# Patient Record
Sex: MALE | Race: WHITE | NOT HISPANIC OR LATINO | Employment: OTHER | URBAN - METROPOLITAN AREA
[De-identification: names, ages, dates, MRNs, and addresses within clinical notes are randomized per-mention and may not be internally consistent; named-entity substitution may affect disease eponyms.]

---

## 2017-02-08 ENCOUNTER — TELEPHONE (OUTPATIENT)
Dept: SURGERY | Facility: CLINIC | Age: 67
End: 2017-02-08

## 2017-02-08 NOTE — TELEPHONE ENCOUNTER
Called patient to check in and see how he is doing, and to also reschedule his OR procedure that was cancelled in December (please see previous telephone encounter).  Patient states he is doing better, and is planning to follow up with his cardiologist in March for an irregular heart beat.  Patient and I both agreed his procedure should be rescheduled sometime after he meets with his cardiologist again , for clearance purposes.  Patient is currently held for surgery 4/26/17.  I told patient I would follow up with him towards the end of March to see how things are going, and to finalize surgery.  Patient is in agreement with this plan, and has my direct number for questions or concerns.

## 2017-03-10 ENCOUNTER — TRANSFERRED RECORDS (OUTPATIENT)
Dept: HEALTH INFORMATION MANAGEMENT | Facility: CLINIC | Age: 67
End: 2017-03-10

## 2017-03-23 ENCOUNTER — TELEPHONE (OUTPATIENT)
Dept: SURGERY | Facility: CLINIC | Age: 67
End: 2017-03-23

## 2017-03-23 NOTE — TELEPHONE ENCOUNTER
Spoke with patient today to check in regarding upcoming surgery, and to see if his cardiologist has cleared him for his procedure.  Patient states he saw his cardiologist last week, who has cleared him to have his procedure.  Informed patient I will contact his cardiology clinic to retrieve the clinic note.  Patient states [facility], and wonders if that will be valid for his pre-op physical.  Informed patient that his pre-op physical needs to be within 30 days of surgery.  Informed patient that he can check with [facility] to see if he is willing to update it (any time after 3/26/17), otherwise he will need an additional appointment.  Patient verbalized understanding.  I told patient I would confirm once we receive his cardiology note, and will mail a surgery packet with all information.  Patient verbalized understanding and has my direct number for questions or concerns.

## 2017-03-24 ENCOUNTER — TRANSFERRED RECORDS (OUTPATIENT)
Dept: HEALTH INFORMATION MANAGEMENT | Facility: CLINIC | Age: 67
End: 2017-03-24

## 2017-03-24 NOTE — TELEPHONE ENCOUNTER
Spoke with patient today, and confirmed I received his most recent cardiology note from [facility].  Confirmed with patient that he will still need to do 1 fleet enema (colostomy established) and that I will have instructions in his prep packet.  Patient confirms he will check with his PCP about updating his most recent H&P, and understands he will need a new H&P if PCP is not agreeable to updating.  Informed patient H&P form will be included in surgery packet.  Patient has my direct number for questions or concerns.

## 2017-04-26 ENCOUNTER — HOSPITAL ENCOUNTER (OUTPATIENT)
Facility: CLINIC | Age: 67
Discharge: HOME | End: 2017-04-26
Attending: COLON & RECTAL SURGERY | Admitting: COLON & RECTAL SURGERY
Payer: COMMERCIAL

## 2017-04-26 ENCOUNTER — ANESTHESIA (OUTPATIENT)
Dept: SURGERY | Facility: CLINIC | Age: 67
End: 2017-04-26
Payer: COMMERCIAL

## 2017-04-26 ENCOUNTER — ANESTHESIA EVENT (OUTPATIENT)
Dept: SURGERY | Facility: CLINIC | Age: 67
End: 2017-04-26
Payer: COMMERCIAL

## 2017-04-26 ENCOUNTER — SURGERY (OUTPATIENT)
Age: 67
End: 2017-04-26

## 2017-04-26 VITALS
RESPIRATION RATE: 16 BRPM | DIASTOLIC BLOOD PRESSURE: 69 MMHG | TEMPERATURE: 98.1 F | OXYGEN SATURATION: 99 % | BODY MASS INDEX: 45.78 KG/M2 | SYSTOLIC BLOOD PRESSURE: 144 MMHG | HEIGHT: 67 IN | WEIGHT: 291.67 LBS

## 2017-04-26 DIAGNOSIS — Z87.19 HISTORY OF ANAL LESION: Primary | ICD-10-CM

## 2017-04-26 LAB
GLUCOSE BLDC GLUCOMTR-MCNC: 176 MG/DL (ref 70–99)
GLUCOSE BLDC GLUCOMTR-MCNC: 236 MG/DL (ref 70–99)

## 2017-04-26 PROCEDURE — 25000128 H RX IP 250 OP 636: Performed by: NURSE ANESTHETIST, CERTIFIED REGISTERED

## 2017-04-26 PROCEDURE — 88305 TISSUE EXAM BY PATHOLOGIST: CPT | Performed by: COLON & RECTAL SURGERY

## 2017-04-26 PROCEDURE — 25000132 ZZH RX MED GY IP 250 OP 250 PS 637: Performed by: COLON & RECTAL SURGERY

## 2017-04-26 PROCEDURE — 36000053 ZZH SURGERY LEVEL 2 EA 15 ADDTL MIN - UMMC: Performed by: COLON & RECTAL SURGERY

## 2017-04-26 PROCEDURE — 25000125 ZZHC RX 250: Performed by: ANESTHESIOLOGY

## 2017-04-26 PROCEDURE — 25800025 ZZH RX 258: Performed by: ANESTHESIOLOGY

## 2017-04-26 PROCEDURE — 25000125 ZZHC RX 250: Performed by: COLON & RECTAL SURGERY

## 2017-04-26 PROCEDURE — 37000008 ZZH ANESTHESIA TECHNICAL FEE, 1ST 30 MIN: Performed by: COLON & RECTAL SURGERY

## 2017-04-26 PROCEDURE — 82962 GLUCOSE BLOOD TEST: CPT

## 2017-04-26 PROCEDURE — 37000009 ZZH ANESTHESIA TECHNICAL FEE, EACH ADDTL 15 MIN: Performed by: COLON & RECTAL SURGERY

## 2017-04-26 PROCEDURE — 36000051 ZZH SURGERY LEVEL 2 1ST 30 MIN - UMMC: Performed by: COLON & RECTAL SURGERY

## 2017-04-26 PROCEDURE — 40000170 ZZH STATISTIC PRE-PROCEDURE ASSESSMENT II: Performed by: COLON & RECTAL SURGERY

## 2017-04-26 PROCEDURE — 27210794 ZZH OR GENERAL SUPPLY STERILE: Performed by: COLON & RECTAL SURGERY

## 2017-04-26 PROCEDURE — 71000027 ZZH RECOVERY PHASE 2 EACH 15 MINS: Performed by: COLON & RECTAL SURGERY

## 2017-04-26 PROCEDURE — 25000125 ZZHC RX 250: Performed by: NURSE ANESTHETIST, CERTIFIED REGISTERED

## 2017-04-26 RX ORDER — SODIUM CHLORIDE, SODIUM LACTATE, POTASSIUM CHLORIDE, CALCIUM CHLORIDE 600; 310; 30; 20 MG/100ML; MG/100ML; MG/100ML; MG/100ML
INJECTION, SOLUTION INTRAVENOUS CONTINUOUS
Status: DISCONTINUED | OUTPATIENT
Start: 2017-04-26 | End: 2017-04-26 | Stop reason: HOSPADM

## 2017-04-26 RX ORDER — FENTANYL CITRATE 50 UG/ML
25-50 INJECTION, SOLUTION INTRAMUSCULAR; INTRAVENOUS EVERY 5 MIN PRN
Status: DISCONTINUED | OUTPATIENT
Start: 2017-04-26 | End: 2017-04-26 | Stop reason: HOSPADM

## 2017-04-26 RX ORDER — PROPOFOL 10 MG/ML
INJECTION, EMULSION INTRAVENOUS PRN
Status: DISCONTINUED | OUTPATIENT
Start: 2017-04-26 | End: 2017-04-26

## 2017-04-26 RX ORDER — ONDANSETRON 2 MG/ML
4 INJECTION INTRAMUSCULAR; INTRAVENOUS EVERY 30 MIN PRN
Status: DISCONTINUED | OUTPATIENT
Start: 2017-04-26 | End: 2017-04-26 | Stop reason: HOSPADM

## 2017-04-26 RX ORDER — ONDANSETRON 4 MG/1
4 TABLET, ORALLY DISINTEGRATING ORAL EVERY 30 MIN PRN
Status: DISCONTINUED | OUTPATIENT
Start: 2017-04-26 | End: 2017-04-26 | Stop reason: HOSPADM

## 2017-04-26 RX ORDER — NALOXONE HYDROCHLORIDE 0.4 MG/ML
.1-.4 INJECTION, SOLUTION INTRAMUSCULAR; INTRAVENOUS; SUBCUTANEOUS
Status: DISCONTINUED | OUTPATIENT
Start: 2017-04-26 | End: 2017-04-26 | Stop reason: HOSPADM

## 2017-04-26 RX ORDER — LABETALOL HYDROCHLORIDE 5 MG/ML
10 INJECTION, SOLUTION INTRAVENOUS
Status: DISCONTINUED | OUTPATIENT
Start: 2017-04-26 | End: 2017-04-26 | Stop reason: HOSPADM

## 2017-04-26 RX ORDER — NAPROXEN 500 MG/1
500 TABLET ORAL
Status: DISCONTINUED | OUTPATIENT
Start: 2017-04-26 | End: 2017-04-26 | Stop reason: HOSPADM

## 2017-04-26 RX ORDER — GINSENG 100 MG
CAPSULE ORAL PRN
Status: DISCONTINUED | OUTPATIENT
Start: 2017-04-26 | End: 2017-04-26 | Stop reason: HOSPADM

## 2017-04-26 RX ORDER — ONDANSETRON 4 MG/1
4 TABLET, ORALLY DISINTEGRATING ORAL
Status: DISCONTINUED | OUTPATIENT
Start: 2017-04-26 | End: 2017-04-26 | Stop reason: HOSPADM

## 2017-04-26 RX ORDER — LIDOCAINE 40 MG/G
CREAM TOPICAL
Status: DISCONTINUED | OUTPATIENT
Start: 2017-04-26 | End: 2017-04-26 | Stop reason: HOSPADM

## 2017-04-26 RX ORDER — FENTANYL CITRATE 50 UG/ML
25-50 INJECTION, SOLUTION INTRAMUSCULAR; INTRAVENOUS
Status: DISCONTINUED | OUTPATIENT
Start: 2017-04-26 | End: 2017-04-26 | Stop reason: HOSPADM

## 2017-04-26 RX ORDER — MEPERIDINE HYDROCHLORIDE 25 MG/ML
12.5 INJECTION INTRAMUSCULAR; INTRAVENOUS; SUBCUTANEOUS
Status: DISCONTINUED | OUTPATIENT
Start: 2017-04-26 | End: 2017-04-26 | Stop reason: HOSPADM

## 2017-04-26 RX ORDER — PROPOFOL 10 MG/ML
INJECTION, EMULSION INTRAVENOUS CONTINUOUS PRN
Status: DISCONTINUED | OUTPATIENT
Start: 2017-04-26 | End: 2017-04-26

## 2017-04-26 RX ORDER — AMOXICILLIN 250 MG
1-2 CAPSULE ORAL 2 TIMES DAILY
Qty: 30 TABLET | Refills: 0 | Status: ON HOLD | OUTPATIENT
Start: 2017-04-26 | End: 2017-08-30

## 2017-04-26 RX ORDER — OXYCODONE HYDROCHLORIDE 5 MG/1
5 TABLET ORAL EVERY 4 HOURS PRN
Status: DISCONTINUED | OUTPATIENT
Start: 2017-04-26 | End: 2017-04-26 | Stop reason: HOSPADM

## 2017-04-26 RX ORDER — OXYCODONE HYDROCHLORIDE 5 MG/1
5 TABLET ORAL EVERY 4 HOURS PRN
Qty: 18 TABLET | Refills: 0 | Status: SHIPPED | OUTPATIENT
Start: 2017-04-26 | End: 2017-05-16

## 2017-04-26 RX ORDER — FENTANYL CITRATE 50 UG/ML
INJECTION, SOLUTION INTRAMUSCULAR; INTRAVENOUS PRN
Status: DISCONTINUED | OUTPATIENT
Start: 2017-04-26 | End: 2017-04-26

## 2017-04-26 RX ORDER — BUPIVACAINE HYDROCHLORIDE AND EPINEPHRINE 2.5; 5 MG/ML; UG/ML
INJECTION, SOLUTION EPIDURAL; INFILTRATION; INTRACAUDAL; PERINEURAL PRN
Status: DISCONTINUED | OUTPATIENT
Start: 2017-04-26 | End: 2017-04-26 | Stop reason: HOSPADM

## 2017-04-26 RX ORDER — ACETAMINOPHEN 325 MG/1
650 TABLET ORAL 4 TIMES DAILY
Qty: 56 TABLET | Refills: 0 | Status: SHIPPED | OUTPATIENT
Start: 2017-04-26 | End: 2017-05-03

## 2017-04-26 RX ORDER — ACETAMINOPHEN 325 MG/1
975 TABLET ORAL ONCE
Status: COMPLETED | OUTPATIENT
Start: 2017-04-26 | End: 2017-04-26

## 2017-04-26 RX ORDER — ACETIC ACID 5 %
LIQUID (ML) MISCELLANEOUS PRN
Status: DISCONTINUED | OUTPATIENT
Start: 2017-04-26 | End: 2017-04-26 | Stop reason: HOSPADM

## 2017-04-26 RX ADMIN — ACETAMINOPHEN 975 MG: 325 TABLET, FILM COATED ORAL at 08:22

## 2017-04-26 RX ADMIN — FENTANYL CITRATE 50 MCG: 50 INJECTION INTRAMUSCULAR; INTRAVENOUS at 11:36

## 2017-04-26 RX ADMIN — BACITRACIN 1 TUBE: 500 OINTMENT TOPICAL at 10:23

## 2017-04-26 RX ADMIN — SODIUM CHLORIDE, POTASSIUM CHLORIDE, SODIUM LACTATE AND CALCIUM CHLORIDE: 600; 310; 30; 20 INJECTION, SOLUTION INTRAVENOUS at 09:40

## 2017-04-26 RX ADMIN — PROPOFOL 20 MG: 10 INJECTION, EMULSION INTRAVENOUS at 10:12

## 2017-04-26 RX ADMIN — FENTANYL CITRATE 50 MCG: 50 INJECTION, SOLUTION INTRAMUSCULAR; INTRAVENOUS at 09:56

## 2017-04-26 RX ADMIN — Medication 60 ML: at 10:22

## 2017-04-26 RX ADMIN — PROPOFOL 10 MG: 10 INJECTION, EMULSION INTRAVENOUS at 10:13

## 2017-04-26 RX ADMIN — PROPOFOL 10 MG: 10 INJECTION, EMULSION INTRAVENOUS at 10:40

## 2017-04-26 RX ADMIN — FENTANYL CITRATE 50 MCG: 50 INJECTION INTRAMUSCULAR; INTRAVENOUS at 11:27

## 2017-04-26 RX ADMIN — FENTANYL CITRATE 50 MCG: 50 INJECTION, SOLUTION INTRAMUSCULAR; INTRAVENOUS at 09:45

## 2017-04-26 RX ADMIN — MIDAZOLAM HYDROCHLORIDE 2 MG: 1 INJECTION, SOLUTION INTRAMUSCULAR; INTRAVENOUS at 09:40

## 2017-04-26 RX ADMIN — BUPIVACAINE HYDROCHLORIDE AND EPINEPHRINE BITARTRATE 30 ML: 2.5; .0091 INJECTION, SOLUTION EPIDURAL; INFILTRATION; INTRACAUDAL; PERINEURAL at 10:22

## 2017-04-26 RX ADMIN — PROPOFOL 40 MCG/KG/MIN: 10 INJECTION, EMULSION INTRAVENOUS at 09:46

## 2017-04-26 NOTE — ANESTHESIA POSTPROCEDURE EVALUATION
Patient: Anselmo Saenzleclaura    Procedure(s):  Anal Exam with Anal Microscopy and Biopsies    - Wound Class: II-Clean Contaminated    Diagnosis:Anal Dysplasia   Diagnosis Additional Information: No value filed.    Anesthesia Type:  MAC    Note:  Anesthesia Post Evaluation    Patient location during evaluation: Phase 2  Patient participation: Able to fully participate in evaluation  Level of consciousness: awake  Pain management: adequate  Airway patency: patent  Cardiovascular status: acceptable  Respiratory status: acceptable  Hydration status: acceptable  PONV: none     Anesthetic complications: None    Comments: No noted anesthetic complications.  Patient satisfied with anesthetic.          Last vitals:  Vitals:    04/26/17 0749   BP: 123/65   Resp: 16   Temp: 36.6  C (97.8  F)   SpO2: 99%         Electronically Signed By: Fab Portillo MD  April 26, 2017  12:26 PM

## 2017-04-26 NOTE — ANESTHESIA CARE TRANSFER NOTE
Patient: Anselmo Saenzleclaura    Procedure(s):  Anal Exam with Anal Microscopy and Biopsies    - Wound Class: II-Clean Contaminated    Diagnosis: Anal Dysplasia   Diagnosis Additional Information: No value filed.    Anesthesia Type:   MAC     Note:  Airway :Face Mask  Patient transferred to:Phase II  Comments: Pt. Breathing spont, VSS, opens eyes to command.  Report to RN.      Vitals: (Last set prior to Anesthesia Care Transfer)    CRNA VITALS  4/26/2017 1027 - 4/26/2017 1105      4/26/2017             Resp Rate (set): 10                Electronically Signed By: DESTINEY Naidu CRNA  April 26, 2017  11:05 AM

## 2017-04-26 NOTE — LETTER
May 8, 2017    Anselmo Valdez  [address] [facility] [address]    Dear Anselmo,    The results of your biopsies showed AIN lll in one specimen.  Dr. Da Silva recommends that you repeat high resolution microscopy in the clinic in 4 months.  I will ask our  to contact you in July/August to schedule in clinic.    Sincerely,  Margaret Mora, RN, BSN  Colon-Rectal Surgery  Care Coordinator  RN Triage line  [phone], option #3      Resulted Orders   Surgical pathology exam   Result Value Ref Range    Copath Report       Patient Name: ANSELMO VALDEZ  MR#: [id number]  Specimen #: [id number]  Collected: 4/26/2017  Received: 4/26/2017  Reported: 4/27/2017 11:46  Ordering Phy(s): BHANU DA SILVA    For improved result formatting, select 'View Enhanced Report Format'  under Linked Documents section.    SPECIMEN(S):  A: Left posterior anal margin  B: Posterior anal ulcer  C: Anterior margin    FINAL DIAGNOSIS:  A. ANUS, LEFT POSTERIOR MARGIN, BIOPSY:  - High grade squamous intraepithelial lesion (anal intraepithelial  neoplasia 3)  - Background chronic inflammation with ulceration    B. ANUS, POSTERIOR ULCER, BIOPSY:  - Squamous and regenerative colonic mucosa with ulceration and  granulation tissue formation  - Negative for dysplasia/intraepithelial neoplasia    C. ANUS, ANTERIOR MARGIN, BIOPSY:  - Denuded tissue with scant residual squamous epithelium  - Negative for dysplasia/intraepithelial neoplasia in the residual  squamous mucosa    I have personally reviewed all specimens and or slides, including the   listed special stains, and used them with my medical judgement to  determine the final diagnosis.    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 Prior pathology has  demonstrated AIN-3 with a chronic anal ulcer.    Anoscopy findings:  The chronic  "posterior midline anal ulcer was visualized. This appeared  now to have a fibrotic base and relatively shallow granulation tissue on  the surface. The lesion looked stable and did not suggest neoplasm -  curetted. At the left margin of the ulcer there was one 3 mm focal area  of acetowhitening and coarse punctation. This was removed with the  Tischler forceps and the area base biopsied. At the anterior anal margin  the mucosa was smooth and had some friable capillaries on the surface.  These did not have the appearance of punctation or mosaicism - biopsied.    GROSS:  A: The specimen  is received in formalin with proper patient  identification, labeled \"left posterior anal margin\".  The specimen  consists of 2 tan-pink soft tissue fragments that range in size from 4-5  mm greatest dimension.  Entirely submitted in A1.    B: The specimen is received in formalin with proper patient  identification, labeled \"posterior anal ulcer\".  The specimen consists  of approximately 2 dusky pink irregularly shaped soft tissue fragments  that range in size from 3-7 mm greatest dimension.  Entirely submitted  in B1.    C: The specimen is received in formalin with proper patient  identification, labeled \"anterior margin\".  The specimen consists of a  gray-pink soft tissue fragment that measures 2 mm greatest dimension.  Entirely submitted in C1. (Dictated by: Josh Morocho 4/26/2017 01:52  PM)    MICROSCOPIC:  Microscopic examination was performed.    CPT Codes:  A: 77583-UK4  B: 50011-RI7  C: 11383-AG0    TESTING LAB LOCATION:  [address]   [id number] [phone]    COLLECTION SITE:  Client: [facility]  Location: [facility] (B)       "

## 2017-04-26 NOTE — IP AVS SNAPSHOT
Same Day Surgery [address] [address]    Phone:  [phone]                                       After Visit Summary   4/26/2017    Anselmo Rainey    MRN: [id number]           After Visit Summary Signature Page     I have received my discharge instructions, and my questions have been answered. I have discussed any challenges I see with this plan with the nurse or doctor.    ..........................................................................................................................................  Patient/Patient Representative Signature      ..........................................................................................................................................  Patient Representative Print Name and Relationship to Patient    ..................................................               ................................................  Date                                            Time    ..........................................................................................................................................  Reviewed by Signature/Title    ...................................................              ..............................................  Date                                                            Time

## 2017-04-26 NOTE — OR NURSING
Dr. Portillo aware of . No orders to treat at this time. Patient drank sprite this morning on purpose so that his sugar would not drop before he could eat again.

## 2017-04-26 NOTE — BRIEF OP NOTE
Colorectal Surgery Brief Op Note    Patient: Anselmo Rainey  MRN: [id number]  Date of Procedure: 4/26/2017    PREOPERATIVE DIAGNOSES: History of high-grade anal dysplasia    POSTOPERATIVE DIAGNOSES:   Same    PROCEDURES: High-resolution anoscopy, anal biopsies.    SURGEON: Sarbjit Mai MD     ASSISTANTS: Rule Martinez MD, Colorectal fellow; Grace Casas MD, Gyn Onc fellow    ANESTHESIA: MAC with perianal block.    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    SPECIMENS:   1. Left posterior anal margin  2 Posterior anal ulcer  3. Anterior margin    COMPLICATIONS: None.     CONDITION: Stable to PACU.

## 2017-04-26 NOTE — PROGRESS NOTES
SPIRITUAL HEALTH SERVICES  [facility] 3C   PRE-SURGERY VISIT    Had pre-surgery visit with Long. He shared briefly that his spiritual background includes the twelve step program. He is a  who was stationed in [place], where he met his wife THOMAS. She is here in the waiting room along with a friend Pino. Provided prayer and spiritual support.    Fariba Gonzalez  Volunteer   Pager [phone]

## 2017-04-26 NOTE — IP AVS SNAPSHOT
MRN:[id number]                      After Visit Summary   4/26/2017    Anselmo Rainey    MRN: [id number]           Thank you!     Thank you for choosing [facility] for your care. Our goal is always to provide you with excellent care. Hearing back from our patients is one way we can continue to improve our services. Please take a few minutes to complete the written survey that you may receive in the mail after you visit with us. Thank you!        Patient Information     Date Of Birth          1950        About your hospital stay     You were admitted on:  April 26, 2017 You last received care in the:  Same Day Surgery [facility]    You were discharged on:  April 26, 2017       Who to Call     For medical emergencies, please call 911.  For non-urgent questions about your medical care, please call your primary care provider or clinic, None  For questions related to your surgery, please call your surgery clinic        Attending Provider     Provider Sarbjit Vallecillo MD Colon and Rectal Surgery       Primary Care Provider Fax #    [facility] [phone]       [address] [facility] [address]        After Care Instructions     Discharge Instructions       Follow up appointment as instructed by Surgeon and/or RN            Discharge Instructions       Resume pre-procedure diet. Do not drink alcohol, drive, or operate machinery while on narcotic pain medication.            Discharge Instructions       You may notice some brown drainage from your anus that resembles coffee grounds or some light bleeding. This is normal. Call physician if you have heavy bleeding.                  Further instructions from your care team       Anorectal Surgery Instructions    What can I expect after anorectal surgery?  Most anorectal procedures are done as outpatient surgery, and you go home the same day as the procedure. A few surgical procedures will require that you stay in  the hospital for about one to three days. No matter where the procedure is done or how long or short it takes, these recommendations will help you heal and feel more comfortable.    Medicines:  The anal area is very sensitive; you can expect to have some pain for up to 2-4 weeks after the procedure. Your doctor will give you a prescription for one or more pain medications.    Take naprosyn 500 mg twice a day OR ibuprofen 600 mg four times a day     Take this on a regular basis (not as needed) following your surgery.     The drugs are best taken with food.  Do not take if it causes stomach upset or if you have a history of ulcers or gastritis. You can stop the naprosyn (or ibuprofen) or reduce the dose when you are feeling better.    DO NOT use naprosyn, ibuprofen, or other similar agents (eg. Advil or Aleve) if you have inflammatory bowel disease (Ulcerative Colitis or Crohn's disease) or if your doctor as advised you against using these medications    Take acetominaphen (Tylenol) 650-1000 mg four times a day.     Take this on a regular basis (not as needed) following surgery for pain control.     Take the lower dose if you are >65 years old or have liver disease. The maximum dose of acetominaphen is 4000 mg a day. You can stop the acetaminophen or reduce the dose when you are feeling better.    It is important to realize that many narcotic pain relievers (including vicodin, percocet, tylenol #3) also have acetaminophen, and excessive doses of acetaminophen can be dangerous, so do not take these in addition to acetominaphen.  You may take narcotics that don't contain acetominaphen such as oxycodone.      Take oxycodone AS NEEDED in addition to the acetominaphen and naprosyn.      Because narcotics have side effects (including constipation), you should reduce your use of these medications as tolerated as your pain improves.    *In general, the best strategy is to take (if you are able to tolerate it) the tylenol and  naprosen on a regular basis until your pain has largely gone away. You can take the narcotic pain medicine as needed in addition to the tylenol and ibuprofen. As your pain begins to lessen, you should cut back on your narcotic use while continuing to take your regular tylenol and naprosyn doses.      Refilling prescriptions. If you need additional pain medication, please call the triage nurse at [phone] during normal business hours (8 a.m. to 4 p.m., Monday though Friday) or have your pharmacy fax a refill request to [phone]. If you call after hours or on the weekends, the doctor on call may not know you personally and may not renew narcotic pain medication by phone. Call your primary care provider for all other medication refills.    Perineal care:  Tub baths:    If possible, take a tub bath immediately after each bowel movement.     Baths should be take at least 3 times daily for the first week to 10 days following your procedure. You should soak in the tub for 10 to 15 minutes each time with water as warm as you can tolerate.     Even after you go back to work, it is a good idea to sit in the tub in the morning, after returning from work, and again in the evening before bedtime.    Bleeding/Infection:    You can expect to have some bleeding after bowel movements, but it should stop soon after you wipe.     Use a wet cloth or perianal pad (Tucks or Preparation H pads) to gently wipe the area after each bowel movement.    Do not rub the anal area or use a lot of pressure.    Using a spray bottle filled with warm water helps loosen any remaining stool. Blot gently with a soft dry cloth or tissue paper.    Infection around the anal opening is not very common. The anal area has excellent blood supply, which helps the area to heal. Bloody discharge after bowel movements is normal and may last 2 to 4 weeks after your surgery. However, if you bleed between bowel movements and cannot get it to stop, call the  triage nurse immediately [phone].    Bowel function:  Take a fiber supplement such as Metamucil, which is over the counter. It is important to drink six to eight glasses of water or juice everyday when using fiber products.    If you do not have a bowel movement after 1-2 days:    Take Milk of Magnesia-2 tablespoons.       If there are no results, repeat this or add over the counter Miralax.      If you still do not have results, contact the clinic.     If there are no results, repeat this. Stop taking Milk of Magnesia or other laxatives if you begin to have diarrhea.    * Constipation will cause you to strain when you have a bowel movement. The hard stool will be difficult to pass, will increase pain and bleeding, and will slow down healing.  Try to avoid constipation and/or diarrhea as this can make the pain and bleeding worse.    * It is important to have regular bowel movements at least every other day and to keep your stool soft.  A high fiber diet, including at least four servings of fruits or vegetables daily, will help to keep your bowel movements regular and soft.    Activity:  After your procedure, there are no restrictions on your activity     except restrictions surrounding being on narcotics and in pain, such as no heavy machine operating or driving.     You may walk, climb stairs, ride in a car, and sit as tolerated.     It is helpful to avoid sitting in one position for long periods (2 or more hours).    After some surgeries, you may be told not to perform any lifting (more than 10 pounds) for several weeks after surgery.    When to call:  When do I need to call the doctor or triage nurse?    If you experience any of the problems listed here, call our triage nurse during business hours ([phone]).     The nurse will help you with your problem or have the doctor call you.     After hours and on weekends, please call the main hospital number ([phone]) and ask for the colon and rectal  surgery person on call.     Some is available to help you 24 hours a day, seven days a week.    Call for:   ? Fever greater than 101 degrees   ? Chills   ? Foul-smelling drainage   ? Nausea and vomiting   ? Diarrhea - greater than 3 water stools in 24 hours   ? Constipation - no bowel movement after 3 days   ? Severe bleeding that does not stop soon after a bowel movement   ? Problems with the incision, including increased pain, swelling, or redness    [facility] [place]  Same-Day Surgery   Adult Discharge Orders & Instructions     For 24 hours after surgery    1. Get plenty of rest.  A responsible adult must stay with you for at least 24 hours after you leave the hospital.   2. Do not drive or use heavy equipment.  If you have weakness or tingling, don't drive or use heavy equipment until this feeling goes away.  3. Do not drink alcohol.  4. Avoid strenuous or risky activities.  Ask for help when climbing stairs.   5. You may feel lightheaded.  IF so, sit for a few minutes before standing.  Have someone help you get up.   6. If you have nausea (feel sick to your stomach): Drink only clear liquids such as apple juice, ginger ale, broth or 7-Up.  Rest may also help.  Be sure to drink enough fluids.  Move to a regular diet as you feel able.  7. You may have a slight fever. Call the doctor if your fever is over 100 F (37.7 C) (taken under the tongue) or lasts longer than 24 hours.  8. You may have a dry mouth, a sore throat, muscle aches or trouble sleeping.  These should go away after 24 hours.  9. Do not make important or legal decisions.   Call your doctor for any of the followin.  Signs of infection (fever, growing tenderness at the surgery site, a large amount of drainage or bleeding, severe pain, foul-smelling drainage, redness, swelling).    2. It has been over 8 to 10 hours since surgery and you are still not able to urinate (pass water).    3.  Headache for over 24  "hours.    To contact a doctor, call Dr Mai's office at [phone]    or:        [phone] and ask for the resident on call for Colorectal surgery (answered 24 hours a day)      Emergency Department:    [facility]: [phone]       ([facility] for hearing impaired: [phone])            Pending Results     Date and Time Order Name Status Description    2017 1037 Surgical pathology exam In process             Admission Information     Date & Time Provider Department Dept. Phone    2017 Sarbjit Mai MD Same Day Surgery [facility] [phone]      Your Vitals Were     Blood Pressure Temperature Respirations Height Weight Pulse Oximetry    123/65 97.8  F (36.6  C) (Oral) 16 1.702 m (5' 7\") 132.3 kg (291 lb 10.7 oz) 99%    BMI (Body Mass Index)                   45.68 kg/m2           MyChart Information     [employer] lets you send messages to your doctor, view your test results, renew your prescriptions, schedule appointments and more. To sign up, go to [email or web address] . Click on \"Log in\" on the left side of the screen, which will take you to the Welcome page. Then click on \"Sign up Now\" on the right side of the page.     You will be asked to enter the access code listed below, as well as some personal information. Please follow the directions to create your username and password.     Your access code is: [email or web address]  Expires: 2017 11:14 AM     Your access code will  in 90 days. If you need help or a new code, please call your [place] clinic or [phone].        Care EveryWhere ID     This is your Care EveryWhere ID. This could be used by other organizations to access your [place] medical records  [id number]           Review of your medicines      START taking        Dose / Directions    acetaminophen 325 MG tablet   Commonly known as:  TYLENOL   Used for:  History of anal lesion        Dose:  650 mg   Take 2 tablets (650 mg) by mouth 4 times daily for 7 days Do not " take Tylenol with codeine or any other tylenol (or acetaminophen) containing products while taking this medication   Quantity:  56 tablet   Refills:  0       oxyCODONE 5 MG IR tablet   Commonly known as:  ROXICODONE   Used for:  History of anal lesion        Dose:  5 mg   Take 1 tablet (5 mg) by mouth every 4 hours as needed for pain (Take for pain not controlled with Tylenol)   Quantity:  18 tablet   Refills:  0       senna-docusate 8.6-50 MG per tablet   Commonly known as:  SENOKOT-S;PERICOLACE   Used for:  History of anal lesion        Dose:  1-2 tablet   Take 1-2 tablets by mouth 2 times daily Use to prevent or treat constipation.   Quantity:  30 tablet   Refills:  0         CONTINUE these medicines which have NOT CHANGED        Dose / Directions    albuterol 108 (90 BASE) MCG/ACT Inhaler   Generic drug:  albuterol        Dose:  2 puff   Inhale 2 puffs into the lungs every 4 hours as needed   Refills:  0       ASPIRIN PO        Dose:  81 mg   Take 81 mg by mouth daily   Refills:  0       buPROPion 75 MG tablet   Commonly known as:  WELLBUTRIN        Dose:  150 mg   Take 150 mg by mouth 3 times daily   Refills:  0       cholecalciferol 1000 UNIT tablet   Commonly known as:  vitamin D        Dose:  2000 Units   Take 2,000 Units by mouth 3 times daily (with meals)   Refills:  0       CLONAZEPAM PO        Dose:  0.5 mg   Take 0.5 mg by mouth as needed for anxiety   Refills:  0       clotrimazole 1 % cream   Commonly known as:  LOTRIMIN        Apply topically 2 times daily   Refills:  0       desonide 0.05 % cream   Commonly known as:  DESOWEN        Apply topically 2 times daily   Refills:  0       ferrous sulfate 325 (65 FE) MG tablet   Commonly known as:  IRON        Take by mouth 3 times daily (with meals)   Refills:  0       fluocinonide 0.05 % cream   Commonly known as:  LIDEX        Apply topically 2 times daily   Refills:  0       FUROSEMIDE PO   Indication:  Edema        Dose:  40 mg   Take 40 mg by mouth  daily   Refills:  0       glipiZIDE 10 MG tablet   Commonly known as:  GLUCOTROL        Dose:  10 mg   Take 10 mg by mouth 2 times daily (before meals) 2 tabs bid   Refills:  0       glucose 4 G Chew chewable tablet        Dose:  3 tablet   Take 3 tablets by mouth daily as needed   Refills:  0       insulin glargine 100 UNIT/ML injection   Commonly known as:  LANTUS        Dose:  46 Units   Inject 46 Units Subcutaneous every morning   Refills:  0       isosorbide mononitrate 30 MG 24 hr tablet   Commonly known as:  IMDUR        Dose:  30 mg   Take 30 mg by mouth daily   Refills:  0       loperamide 2 MG capsule   Commonly known as:  IMODIUM        Dose:  2 mg   Take 2 mg by mouth 4 times daily as needed   Refills:  0       losartan 50 MG tablet   Commonly known as:  COZAAR        Dose:  100 mg   Take 100 mg by mouth daily   Refills:  0       metFORMIN 850 MG tablet   Commonly known as:  GLUCOPHAGE        Dose:  850 mg   Take 850 mg by mouth 2 times daily (with meals)   Refills:  0       miconazole 2 % powder   Commonly known as:  MICATIN; MICRO GUARD        Apply topically as needed   Refills:  0       mometasone 220 MCG/INH Inhaler   Commonly known as:  ASMANEX        Dose:  2 puff   Inhale 2 puffs into the lungs 2 times daily   Refills:  0       nitroglycerin 0.4 MG sublingual tablet   Commonly known as:  NITROSTAT        Place under the tongue every 5 minutes as needed   Refills:  0       OMEPRAZOLE PO        Dose:  20 mg   Take 20 mg by mouth 2 tabs bid   Refills:  0       orlistat 120 MG capsule   Commonly known as:  XENICAL        Dose:  120 mg   Take 120 mg by mouth 3 times daily (with meals)   Refills:  0       simvastatin 40 MG tablet   Commonly known as:  ZOCOR        Dose:  20 mg   Take 20 mg by mouth At Bedtime   Refills:  0       TEGRETOL PO        Dose:  400 mg   Take 400 mg by mouth 4 times daily   Refills:  0       TERAZOSIN HCL PO        Dose:  2 mg   Take 2 mg by mouth daily   Refills:  0        TOPIRAMATE PO        Dose:  50 mg   Take 50 mg by mouth daily   Refills:  0       triamcinolone 0.1 % cream   Commonly known as:  KENALOG        Apply topically 2 times daily   Refills:  0            Where to get your medicines      These medications were sent to [facility] - [place], [place] - [address]  [address] [address]     Phone:  [phone]     senna-docusate 8.6-50 MG per tablet         Some of these will need a paper prescription and others can be bought over the counter. Ask your nurse if you have questions.     Bring a paper prescription for each of these medications     acetaminophen 325 MG tablet    oxyCODONE 5 MG IR tablet                Protect others around you: Learn how to safely use, store and throw away your medicines at www.disposemymeds.org.             Medication List: This is a list of all your medications and when to take them. Check marks below indicate your daily home schedule. Keep this list as a reference.      Medications           Morning Afternoon Evening Bedtime As Needed    acetaminophen 325 MG tablet   Commonly known as:  TYLENOL   Take 2 tablets (650 mg) by mouth 4 times daily for 7 days Do not take Tylenol with codeine or any other tylenol (or acetaminophen) containing products while taking this medication   Last time this was given:  975 mg on 4/26/2017  8:22 AM                                albuterol 108 (90 BASE) MCG/ACT Inhaler   Inhale 2 puffs into the lungs every 4 hours as needed   Generic drug:  albuterol                                ASPIRIN PO   Take 81 mg by mouth daily                                buPROPion 75 MG tablet   Commonly known as:  WELLBUTRIN   Take 150 mg by mouth 3 times daily                                cholecalciferol 1000 UNIT tablet   Commonly known as:  vitamin D   Take 2,000 Units by mouth 3 times daily (with meals)                                CLONAZEPAM PO   Take 0.5 mg by mouth as needed  for anxiety                                clotrimazole 1 % cream   Commonly known as:  LOTRIMIN   Apply topically 2 times daily                                desonide 0.05 % cream   Commonly known as:  DESOWEN   Apply topically 2 times daily                                ferrous sulfate 325 (65 FE) MG tablet   Commonly known as:  IRON   Take by mouth 3 times daily (with meals)                                fluocinonide 0.05 % cream   Commonly known as:  LIDEX   Apply topically 2 times daily                                FUROSEMIDE PO   Take 40 mg by mouth daily                                glipiZIDE 10 MG tablet   Commonly known as:  GLUCOTROL   Take 10 mg by mouth 2 times daily (before meals) 2 tabs bid                                glucose 4 G Chew chewable tablet   Take 3 tablets by mouth daily as needed                                insulin glargine 100 UNIT/ML injection   Commonly known as:  LANTUS   Inject 46 Units Subcutaneous every morning                                isosorbide mononitrate 30 MG 24 hr tablet   Commonly known as:  IMDUR   Take 30 mg by mouth daily                                loperamide 2 MG capsule   Commonly known as:  IMODIUM   Take 2 mg by mouth 4 times daily as needed                                losartan 50 MG tablet   Commonly known as:  COZAAR   Take 100 mg by mouth daily                                metFORMIN 850 MG tablet   Commonly known as:  GLUCOPHAGE   Take 850 mg by mouth 2 times daily (with meals)                                miconazole 2 % powder   Commonly known as:  MICATIN; MICRO GUARD   Apply topically as needed                                mometasone 220 MCG/INH Inhaler   Commonly known as:  ASMANEX   Inhale 2 puffs into the lungs 2 times daily                                nitroglycerin 0.4 MG sublingual tablet   Commonly known as:  NITROSTAT   Place under the tongue every 5 minutes as needed                                OMEPRAZOLE PO   Take 20  mg by mouth 2 tabs bid                                orlistat 120 MG capsule   Commonly known as:  XENICAL   Take 120 mg by mouth 3 times daily (with meals)                                oxyCODONE 5 MG IR tablet   Commonly known as:  ROXICODONE   Take 1 tablet (5 mg) by mouth every 4 hours as needed for pain (Take for pain not controlled with Tylenol)                                senna-docusate 8.6-50 MG per tablet   Commonly known as:  SENOKOT-S;PERICOLACE   Take 1-2 tablets by mouth 2 times daily Use to prevent or treat constipation.                                simvastatin 40 MG tablet   Commonly known as:  ZOCOR   Take 20 mg by mouth At Bedtime                                TEGRETOL PO   Take 400 mg by mouth 4 times daily                                TERAZOSIN HCL PO   Take 2 mg by mouth daily                                TOPIRAMATE PO   Take 50 mg by mouth daily                                triamcinolone 0.1 % cream   Commonly known as:  KENALOG   Apply topically 2 times daily

## 2017-04-26 NOTE — ANESTHESIA PREPROCEDURE EVALUATION
ANESTHESIA PREOP EVALUATION    HPI: Anselmo Rainey is a 66 year old male who presents for Procedure(s):  Anal Exam with Anal Microscopy and Biopsies    - Wound Class: II-Clean Contaminated      No personal or family h/o anesthesia problems.  Has had multiple anesthetics for similar procedure with MAC and deep sedation.      PMHx/PSHx/ROS:  Past Medical History:   Diagnosis Date     Congestive heart failure, unspecified      COPD (chronic obstructive pulmonary disease) (H)      Coronary artery disease      Diabetes (H)      Gastro-oesophageal reflux disease      Heart disease      History of rectal bleeding      Hypertension      Ileostomy in place (H)      Ischemic colitis (H)      PONV (postoperative nausea and vomiting)      Psoriasis      PTSD (post-traumatic stress disorder)      Seizures (H)      Sleep apnea     no cpap     Stented coronary artery 2011    2 STENTS       Past Surgical History:   Procedure Laterality Date     ANOSCOPY  5/28/2014    Procedure: ANOSCOPY;  Surgeon: Sarbjit Mai MD;  [facility]     BIOPSY ANAL N/A 10/22/2014    Procedure: BIOPSY ANAL;  Surgeon: Sarbjit Mai MD;  [facility]     C LAP,SURG,COLECTOMY,TOTAL,W/PROCTECTOMY       COLONOSCOPY       EXAM UNDER ANESTHESIA ANUS  5/28/2014    Procedure: EXAM UNDER ANESTHESIA ANUS;  Surgeon: Sarbjit Mai MD;  [facility]     EXAM UNDER ANESTHESIA ANUS N/A 10/22/2014    Procedure: EXAM UNDER ANESTHESIA ANUS;  Surgeon: Sarbjit Mai MD;  [facility]     EXAM UNDER ANESTHESIA RECTUM N/A 6/8/2015    Procedure: EXAM UNDER ANESTHESIA RECTUM;  Surgeon: Sarbjit Mai MD;  [facility]     HC PERIANAL BIOPSY      with emergency surgery for post excision or post biopsy hemorrhage     ILEOSTOMY       ILEOSTOMY       MICROSCOPY ANAL N/A 10/22/2014    Procedure: MICROSCOPY ANAL;  Surgeon: Sarbjit Mai MD;  [facility]     MICROSCOPY ANAL N/A 6/8/2015    Procedure: MICROSCOPY ANAL;  Surgeon: Sarbjit Mai MD;   [facility]     SIGMOIDOSCOPY FLEXIBLE  5/28/2014    Procedure: SIGMOIDOSCOPY FLEXIBLE;  Surgeon: Sarbjit Mai MD;  [facility]     STENT         Soc Hx:   Social History   Substance Use Topics     Smoking status: Former Smoker     Smokeless tobacco: Not on file      Comment: Quit in 1999     Alcohol use No       Allergies:   Allergies   Allergen Reactions     Cephalexin Difficulty breathing     Gemfibrozil Other (See Comments)     unknown     Levofloxacin Difficulty breathing     Lisinopril Cough       Meds:     No current facility-administered medications on file prior to encounter.   Current Outpatient Prescriptions on File Prior to Encounter:  oxyCODONE-acetaminophen (PERCOCET) 5-325 MG per tablet Take 1-2 tablets by mouth every 6 hours as needed for moderate to severe pain   CarBAMazepine (TEGRETOL PO) Take 400 mg by mouth 4 times daily   OMEPRAZOLE PO Take 20 mg by mouth 2 tabs bid   TERAZOSIN HCL PO Take 2 mg by mouth daily   TOPIRAMATE PO Take 50 mg by mouth daily   CLONAZEPAM PO Take 0.5 mg by mouth as needed for anxiety   oxyCODONE (ROXICODONE) 5 MG immediate release tablet Take 1-2 tablets (5-10 mg) by mouth every 4 hours as needed for moderate to severe pain   FUROSEMIDE PO Take 40 mg by mouth daily   ASPIRIN PO Take 81 mg by mouth daily   albuterol (ALBUTEROL) 108 (90 BASE) MCG/ACT inhaler Inhale 2 puffs into the lungs every 4 hours as needed   diphenoxylate-atropine (LOMOTIL) 2.5-0.025 MG per tablet Take 1 tablet by mouth 4 times daily as needed   buPROPion (WELLBUTRIN) 75 MG tablet Take 150 mg by mouth 2 times daily   cholecalciferol (VITAMIN D3) 1000 UNIT tablet Take 3,000 Units by mouth daily   clotrimazole (LOTRIMIN) 1 % cream Apply topically 2 times daily   desonide (DESOWEN) 0.05 % cream Apply topically 2 times daily   ferrous sulfate 325 (65 FE) MG tablet Take by mouth 3 times daily (with meals)    fluocinonide (LIDEX) 0.05 % cream Apply topically 2 times daily   glipiZIDE (GLUCOTROL)  10 MG tablet Take 10 mg by mouth 2 times daily (before meals) 2 tabs bid   glucose 4 G CHEW Take 3 tablets by mouth daily as needed   hyaluronidase human (HYLENEX) 150 UNIT/ML SOLN Inject 46 Units Subcutaneous every morning 50 - 60 units in am   isosorbide mononitrate (IMDUR) 30 MG 24 hr tablet Take 30 mg by mouth daily   LIDOCAINE EX Externally apply topically 2 times daily as needed   loperamide (IMODIUM) 2 MG capsule Take 2 mg by mouth 4 times daily as needed   losartan (COZAAR) 50 MG tablet Take 150 mg by mouth daily    metFORMIN (GLUCOPHAGE) 850 MG tablet Take 850 mg by mouth 2 times daily (with meals)   miconazole (MICATIN; MICRO GUARD) 2 % powder Apply topically as needed   mometasone (ASMANEX) 220 MCG/INH inhaler Inhale 2 puffs into the lungs 2 times daily   nitroglycerin (NITROSTAT) 0.4 MG SL tablet Place under the tongue every 5 minutes as needed   orlistat (XENICAL) 120 MG capsule Take 120 mg by mouth 3 times daily (with meals)   simvastatin (ZOCOR) 40 MG tablet Take 20 mg by mouth At Bedtime   triamcinolone (KENALOG) 0.1 % cream Apply topically 2 times daily       [facility] Hb 11.4, Cr 1.4, a1c 9.1)  No results found for this or any previous visit.  No results found for this or any previous visit (from the past 8760 hour(s)).    ECG (4/2017): NSR, 1st deg AVB, bifascicular block (not new)         Physical Exam  Normal systems: dental    Airway   Mallampati: III  TM distance: >3 FB  Neck ROM: full    Dental     Cardiovascular   Rhythm and rate: regular and normal      Pulmonary    breath sounds clear to auscultation                    Anesthesia Plan      History & Physical Review  History and physical reviewed and following examination; no interval change.    ASA Status:  3 .    NPO Status:  > 8 hours    Plan for MAC with Intravenous and Propofol induction. Maintenance will be Balanced.  Reason for MAC:  Chronic cardiopulmonary disease (G9) and Deep or markedly invasive  procedure (G8)  PONV prophylaxis:  Ondansetron (or other 5HT-3)  [place] [facility] and reportedly previously difficult airway (with ostomy surgery).  I discussed the risks and benefits of MAC with moderate/deep sedation with the patient.  Questions were sought and answered.      Fab Portillo MD  Attending Anesthesiologist        Postoperative Care  Postoperative pain management:  IV analgesics and Oral pain medications.      Consents  Anesthetic plan, risks, benefits and alternatives discussed with:  Patient..                          .

## 2017-04-26 NOTE — OP NOTE
PREOPERATIVE DIAGNOSIS:  Followup anal dysplasia, history of ulcerative versus ischemic colitis, history of chronic anal ulcer.      POSTOPERATIVE DIAGNOSIS:  Followup anal dysplasia, history of ulcerative versus ischemic colitis, history of chronic anal ulcer.      PROCEDURE:  Examination under anesthesia with high-definition anal microscopy and biopsies.      HISTORY:  This 66-year-old man underwent emergency colectomy for what was felt at the time to be ischemic colitis in 2012.  Final pathology was more consistent with chronic inflammatory bowel disease.  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  I have been to the operating room with Long on several occasions to follow up the ulcer.  This has gradually decreased in size and was stable in size the last time I examined him.  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  His most recent biopsy showed AIN-1 and the bulk of the ulcer appeared to be chronic inflammatory tissue.  The patient has been doing well at home and presents for routine followup examination.  All risks and alternatives were outlined in detail.  I answered all the patient's questions to his stated satisfaction.  He expressed understanding and provided informed consent.      SURGEON:  Sarbjit Mai MD      ASSISTANTS:  Dr. Martinez, Dr. Casas.      DESCRIPTION OF PROCEDURE:  With Anselmo Rainey in the left lateral decubitus position and the buttocks taped apart, under intravenous sedation by Anesthesia, the perianal skin was prepped and draped in sterile fashion.  A timeout was performed and the patient and procedure confirmed.  Local infiltration of 0.25% Marcaine with epinephrine was utilized and a satisfactory perianal block was obtained.  The anal canal and perianal skin were soaked with acetic acid and high definition anal microscopy was performed using the colposcope.  Again noted the chronic posterior midline anal  ulcer.  This appeared now to have a fibrotic base and relatively shallow granulation tissue on the surface.  The lesion looked stable and did not suggest neoplasm.  I curetted the surface of the ulcer and sent the curettage for permanent section.  Hemostasis was secured with electrocautery and Monsel solution.  At the left margin of the ulcer there was one focal area of acetowhitening and coarse punctation.  This measured about 3 mm in diameter only.  It was very well demarcated.  This was removed with the Tischler forceps and the area base biopsied.  The patient had residual diversion or residual proctitis and the field of view was obscured by oozing from the rectum.  I temporarily packed the rectum with a sponge which helped alleviate this.  Careful inspection of the remaining anal canal showed mild acetowhitening but no intense acetowhitening and no other microvascular change.  At the anterior anal margin the mucosa was smooth and had some friable capillaries on the surface.  These did not have the appearance of punctation or mosaicism.  The area did look distinct for remaining anal margin, so I obtained a biopsy for permanent section and again used electrocautery for hemostasis.  The anal canal was reinspected and hemostasis was satisfactory.  We removed the gauze sponge from the rectum.  A Bacitracin/fluff dressing was applied and procedure terminated.      Estimated blood loss was 10 mL.  The patient tolerated the procedure well without evident complications.  Sponge, needle and instrument counts were reported as correct at the conclusion of the case x2.         BHANU DA SILVA MD             D: 2017 11:06   T: 2017 11:26   MT: CG      Name:     DEMIAN VALDEZ   MRN:      [id number]        Account:        [id number]   :      1950           Procedure Date: 2017      Document: [id number]       cc: JESSICA MCALLISTER MD       [facility] [place]  [facility]

## 2017-04-27 ENCOUNTER — TELEPHONE (OUTPATIENT)
Dept: SURGERY | Facility: CLINIC | Age: 67
End: 2017-04-27

## 2017-04-27 LAB — COPATH REPORT: NORMAL

## 2017-04-27 NOTE — TELEPHONE ENCOUNTER
"Called to check on patient after procedure yesterday. He states that he is having \"lots of problems\". He is sore but states that pain is managed with pain meds. He feels \"out of it\". He denies any nausea, vomiting, fevers, chills, or bleeding. He is passing stool through his ostomy. Advised him to contact the clinic later today if he is still not feeling well as he cannot tell me exactly what he feels is wrong. Will follow up with the results of his biopsies for follow up plan.  Patient's questions were answered to his stated satisfaction and he is in agreement with this plan.    DESTINEY Gomez, NP-C  Colon and Rectal Surgery  [facility]    "

## 2017-07-25 ENCOUNTER — TELEPHONE (OUTPATIENT)
Dept: SURGERY | Facility: CLINIC | Age: 67
End: 2017-07-25

## 2017-07-25 NOTE — TELEPHONE ENCOUNTER
Patient left a message stating he would like to schedule his repeat procedure with Dr. Mai.  Called and spoke with patient.  Patient is due end of August for repeat HRA in the OR.  Patient is tentatively held 08/30/17.  Informed patient our [facility] will [facility] with [facility] to get [facility], and have the [facility] contact him for a pre-op physical.  Informed patient I will update him once completed.  Patient confirms, and has my direct number for additional questions or concerns.

## 2017-08-01 ENCOUNTER — TELEPHONE (OUTPATIENT)
Dept: SURGERY | Facility: CLINIC | Age: 67
End: 2017-08-01

## 2017-08-01 DIAGNOSIS — K62.82 ANAL DYSPLASIA: Primary | ICD-10-CM

## 2017-08-01 PROBLEM — E66.01 MORBID OBESITY (H): Status: ACTIVE | Noted: 2017-08-01

## 2017-08-01 PROBLEM — E78.5 HYPERLIPIDEMIA: Status: ACTIVE | Noted: 2017-08-01

## 2017-08-01 PROBLEM — F43.10 POSTTRAUMATIC STRESS DISORDER: Status: ACTIVE | Noted: 2017-08-01

## 2017-08-01 PROBLEM — K21.9 GASTROESOPHAGEAL REFLUX DISEASE: Status: ACTIVE | Noted: 2017-08-01

## 2017-08-01 PROBLEM — I10 ESSENTIAL HYPERTENSION: Status: ACTIVE | Noted: 2017-08-01

## 2017-08-01 PROBLEM — G47.30 SLEEP APNEA: Status: ACTIVE | Noted: 2017-08-01

## 2017-08-01 PROBLEM — I51.7 CARDIOMEGALY: Status: ACTIVE | Noted: 2017-08-01

## 2017-08-01 PROBLEM — L71.9 ROSACEA: Status: ACTIVE | Noted: 2017-08-01

## 2017-08-01 PROBLEM — E11.9 DIABETES MELLITUS WITHOUT COMPLICATION (H): Status: ACTIVE | Noted: 2017-08-01

## 2017-08-01 PROBLEM — F95.2 GILLES DE LA TOURETTE'S SYNDROME: Status: ACTIVE | Noted: 2017-08-01

## 2017-08-01 NOTE — TELEPHONE ENCOUNTER
Patient is finalized for surgery 08/30/17 at 7:30 am with a 5:30 am arrival.  Called and spoke with patient.  Patient confirms surgery date, time, and arrival time.  Confirmed with patient that we received [facility] the [facility].  Instructed patient to get his pre-op physical anytime now with [facility].  Informed patient I will mail a surgery packet including a pre-op physical form he can bring with to his appointment.  Patient confirmed, and has my direct number for additional questions or concerns.

## 2017-08-23 ENCOUNTER — TRANSFERRED RECORDS (OUTPATIENT)
Dept: HEALTH INFORMATION MANAGEMENT | Facility: CLINIC | Age: 67
End: 2017-08-23

## 2017-08-24 ENCOUNTER — TELEPHONE (OUTPATIENT)
Dept: SURGERY | Facility: CLINIC | Age: 67
End: 2017-08-24

## 2017-08-24 NOTE — TELEPHONE ENCOUNTER
----- Message from Marcy Peterson, RN sent at 8/23/2017  4:31 PM CDT -----  Regarding: Sergei pt, preop / questions and concerns  Anselmo calling, has procedure next week with Dr. Mai.  1. Pre op paperwork came today after his appointment [facility].  (I advised he bring paperwork to them tomorrow)  2. He's upset he received an unsealed envelope in the mail. Anyone could have seen his personal information regarding the upcomming appointment / procedure. (Will pass it on)  3. Got a phone call, connection was poor, transferred to [place] at his request to see if about procedure.    Thanks, Kenyatta

## 2017-08-24 NOTE — TELEPHONE ENCOUNTER
Left message for Release of Information at [facility], requesting pre-op H&P report be faxed to [phone] Attn: Caterina.  Requested a call back to confirm once completed.  Provided my direct number.

## 2017-08-24 NOTE — TELEPHONE ENCOUNTER
Stephanie from release of information ([facility]) called me back.  Stephanie asked that I fax a request with patient demographics for records.  Request faxed to [phone], requesting H&P report be faxed to [phone] Attn: Caterina.

## 2017-08-24 NOTE — TELEPHONE ENCOUNTER
Per task, called and spoke with patient.  I profusely apologized for the unsealed envelope that he received, and informed him I would be addressing this issue with our clinic manager.  I also discussed his pre-op paperwork issue.  Patient states his pre-op physical was yesterday, but the [facility] didn't have a fax number to send the paperwork.  Informed patient I will contact the [facility], and have them fax the paperwork, so he will not need to bring the paperwork to the [facility].  Informed patient that most likely the PAC nurses contacted him regarding his procedure next week.  Offered to transfer patient to their clinic, however patient states he will call them later today.  Provided patient with direct number to PAC. Patient states he also needs to contact registration (as he has a couple additional questions for them).  Provided patient with registrations direct number.  Patient has my direct number for any additional questions or concerns.  Message sent to Emmanuelle Phillips, Clinic Manager, regarding patients mail concerns.

## 2017-08-29 ENCOUNTER — ANESTHESIA EVENT (OUTPATIENT)
Dept: SURGERY | Facility: CLINIC | Age: 67
End: 2017-08-29
Payer: COMMERCIAL

## 2017-08-29 ASSESSMENT — COPD QUESTIONNAIRES: COPD: 1

## 2017-08-29 NOTE — ANESTHESIA PREPROCEDURE EVALUATION
ANESTHESIA PREOP EVALUATION    HPI: Anselmo Rainey is a 66 year old male who presents for Procedure(s):  Examination Under anethesia, Anal Microscopy With Biopsies - Wound Class:    - Wound Class:       No personal or family h/o anesthesia problems.  Has had multiple anesthetics for similar procedure with MAC and deep sedation.      PMHx/PSHx/ROS:  Past Medical History:   Diagnosis Date     Congestive heart failure, unspecified      COPD (chronic obstructive pulmonary disease) (H)      Coronary artery disease      Diabetes (H)      Gastro-oesophageal reflux disease      Heart disease      History of rectal bleeding      Hypertension      Ileostomy in place (H)      Ischemic colitis (H)      PONV (postoperative nausea and vomiting)      Psoriasis      PTSD (post-traumatic stress disorder)      Seizures (H)      Sleep apnea     no cpap     Stented coronary artery 2011    2 STENTS       Past Surgical History:   Procedure Laterality Date     ANOSCOPY  5/28/2014    Procedure: ANOSCOPY;  Surgeon: Sarbjit Mai MD;  [facility]     BIOPSY ANAL N/A 10/22/2014    Procedure: BIOPSY ANAL;  Surgeon: Sarbjit Mai MD;  [facility]     C LAP,SURG,COLECTOMY,TOTAL,W/PROCTECTOMY       COLONOSCOPY       EXAM UNDER ANESTHESIA ANUS  5/28/2014    Procedure: EXAM UNDER ANESTHESIA ANUS;  Surgeon: Sarbjit Mai MD;  [facility]     EXAM UNDER ANESTHESIA ANUS N/A 10/22/2014    Procedure: EXAM UNDER ANESTHESIA ANUS;  Surgeon: Sarbjit Mai MD;  [facility]     EXAM UNDER ANESTHESIA ANUS N/A 4/26/2017    Procedure: EXAM UNDER ANESTHESIA ANUS;  Anal Exam with Anal Microscopy and Biopsies ;  Surgeon: Sarbjit Mai MD;  [facility]     EXAM UNDER ANESTHESIA RECTUM N/A 6/8/2015    Procedure: EXAM UNDER ANESTHESIA RECTUM;  Surgeon: Sarbjit Mai MD;  [facility]     HC PERIANAL BIOPSY      with emergency surgery for post excision or post biopsy hemorrhage     ILEOSTOMY       ILEOSTOMY       MICROSCOPY ANAL N/A  10/22/2014    Procedure: MICROSCOPY ANAL;  Surgeon: Sarbjit Mai MD;  [facility]     MICROSCOPY ANAL N/A 6/8/2015    Procedure: MICROSCOPY ANAL;  Surgeon: Sarbjit Mai MD;  [facility]     MICROSCOPY ANAL N/A 4/26/2017    Procedure: MICROSCOPY ANAL;;  Surgeon: Sarbjit Mai MD;  [facility]     SIGMOIDOSCOPY FLEXIBLE  5/28/2014    Procedure: SIGMOIDOSCOPY FLEXIBLE;  Surgeon: Sarbjit Mai MD;  [facility]     STENT         Soc Hx:   Social History   Substance Use Topics     Smoking status: Former Smoker     Smokeless tobacco: Not on file      Comment: Quit in 1999     Alcohol use No       Allergies:   Allergies   Allergen Reactions     Cephalexin Difficulty breathing     Gemfibrozil Other (See Comments)     unknown     Levofloxacin Difficulty breathing     Lisinopril Cough       Meds:     No current facility-administered medications on file prior to encounter.   Current Outpatient Prescriptions on File Prior to Encounter:  insulin glargine (LANTUS) 100 UNIT/ML injection Inject 46 Units Subcutaneous every morning   senna-docusate (SENOKOT-S;PERICOLACE) 8.6-50 MG per tablet Take 1-2 tablets by mouth 2 times daily Use to prevent or treat constipation.   CarBAMazepine (TEGRETOL PO) Take 400 mg by mouth 4 times daily   OMEPRAZOLE PO Take 20 mg by mouth 2 tabs bid   TERAZOSIN HCL PO Take 2 mg by mouth daily   TOPIRAMATE PO Take 50 mg by mouth daily   CLONAZEPAM PO Take 0.5 mg by mouth as needed for anxiety   FUROSEMIDE PO Take 40 mg by mouth daily   ASPIRIN PO Take 81 mg by mouth daily   albuterol (ALBUTEROL) 108 (90 BASE) MCG/ACT inhaler Inhale 2 puffs into the lungs every 4 hours as needed   buPROPion (WELLBUTRIN) 75 MG tablet Take 150 mg by mouth 3 times daily    cholecalciferol (VITAMIN D3) 1000 UNIT tablet Take 2,000 Units by mouth 3 times daily (with meals)    clotrimazole (LOTRIMIN) 1 % cream Apply topically 2 times daily   desonide (DESOWEN) 0.05 % cream Apply topically 2 times daily    ferrous sulfate 325 (65 FE) MG tablet Take by mouth 3 times daily (with meals)    fluocinonide (LIDEX) 0.05 % cream Apply topically 2 times daily   glipiZIDE (GLUCOTROL) 10 MG tablet Take 10 mg by mouth 2 times daily (before meals) 2 tabs bid   glucose 4 G CHEW Take 3 tablets by mouth daily as needed   isosorbide mononitrate (IMDUR) 30 MG 24 hr tablet Take 30 mg by mouth daily   loperamide (IMODIUM) 2 MG capsule Take 2 mg by mouth 4 times daily as needed   losartan (COZAAR) 50 MG tablet Take 100 mg by mouth daily    metFORMIN (GLUCOPHAGE) 850 MG tablet Take 850 mg by mouth 2 times daily (with meals)   miconazole (MICATIN; MICRO GUARD) 2 % powder Apply topically as needed   mometasone (ASMANEX) 220 MCG/INH inhaler Inhale 2 puffs into the lungs 2 times daily   nitroglycerin (NITROSTAT) 0.4 MG SL tablet Place under the tongue every 5 minutes as needed   orlistat (XENICAL) 120 MG capsule Take 120 mg by mouth 3 times daily (with meals)   simvastatin (ZOCOR) 40 MG tablet Take 20 mg by mouth At Bedtime   triamcinolone (KENALOG) 0.1 % cream Apply topically 2 times daily       [facility] Hb 11.4, Cr 1.4, a1c 9.1)  No results found for this or any previous visit.  No results found for this or any previous visit (from the past 8760 hour(s)).    ECG (4/2017): NSR, 1st deg AVB, bifascicular block (not new)    Anesthesia Evaluation     .             ROS/MED HX    ENT/Pulmonary:     (+)sleep apnea, COPD, , . .    Neurologic:       Cardiovascular:     (+) hypertension--CAD, -CABG-. : . CHF . . pacemaker :. . Previous cardiac testing date:results:date: results:ECG reviewed date: results:3rd degree heart block date: results:          METS/Exercise Tolerance:     Hematologic:         Musculoskeletal:         GI/Hepatic:     (+) GERD       Renal/Genitourinary:         Endo:     (+) type I DM, Obesity, .      Psychiatric:         Infectious Disease:         Malignancy:         Other:    (+) No chance of  pregnancy no H/O Chronic Pain,no other significant disability                    Physical Exam  Normal systems: dental    Airway   Mallampati: III  TM distance: >3 FB  Neck ROM: full    Dental     Cardiovascular   Rhythm and rate: regular and normal      Pulmonary    breath sounds clear to auscultation                        Anesthesia Plan      History & Physical Review  History and physical reviewed and following examination; no interval change.    ASA Status:  3 .    NPO Status:  > 8 hours    Plan for MAC with Intravenous and Propofol induction. Maintenance will be Balanced.  Reason for MAC:  Chronic cardiopulmonary disease (G9) and Deep or markedly invasive procedure (G8)  PONV prophylaxis:  Ondansetron (or other 5HT-3)  [place] [facility] [place] given [place] and reportedly previously difficult airway (with ostomy surgery).  I discussed the risks and benefits of MAC with moderate/deep sedation with the patient.  Questions were sought and answered.      Fab Portillo MD  Attending Anesthesiologist      Third degree heart block is present and was present in April for previous procedure under MAC which he tolerated well. Plan do procedure with external pacing pads in place. Cardiology consult prior to discharge.         Postoperative Care  Postoperative pain management:  IV analgesics and Oral pain medications.      Consents  Anesthetic plan, risks, benefits and alternatives discussed with:  Patient..                          .

## 2017-08-30 ENCOUNTER — HOSPITAL ENCOUNTER (OUTPATIENT)
Facility: CLINIC | Age: 67
Discharge: HOME | End: 2017-08-30
Attending: COLON & RECTAL SURGERY | Admitting: COLON & RECTAL SURGERY
Payer: COMMERCIAL

## 2017-08-30 ENCOUNTER — ANESTHESIA (OUTPATIENT)
Dept: SURGERY | Facility: CLINIC | Age: 67
End: 2017-08-30
Payer: COMMERCIAL

## 2017-08-30 ENCOUNTER — SURGERY (OUTPATIENT)
Age: 67
End: 2017-08-30

## 2017-08-30 VITALS
WEIGHT: 288.14 LBS | BODY MASS INDEX: 45.22 KG/M2 | OXYGEN SATURATION: 100 % | DIASTOLIC BLOOD PRESSURE: 57 MMHG | SYSTOLIC BLOOD PRESSURE: 123 MMHG | TEMPERATURE: 97.4 F | HEIGHT: 67 IN | RESPIRATION RATE: 16 BRPM

## 2017-08-30 DIAGNOSIS — K59.03 DRUG-INDUCED CONSTIPATION: Primary | ICD-10-CM

## 2017-08-30 DIAGNOSIS — K62.9 ANAL LESION: ICD-10-CM

## 2017-08-30 LAB
GLUCOSE BLDC GLUCOMTR-MCNC: 115 MG/DL (ref 70–99)
GLUCOSE BLDC GLUCOMTR-MCNC: 185 MG/DL (ref 70–99)

## 2017-08-30 PROCEDURE — 37000009 ZZH ANESTHESIA TECHNICAL FEE, EACH ADDTL 15 MIN: Performed by: COLON & RECTAL SURGERY

## 2017-08-30 PROCEDURE — 25000132 ZZH RX MED GY IP 250 OP 250 PS 637: Mod: GY | Performed by: ANESTHESIOLOGY

## 2017-08-30 PROCEDURE — A9270 NON-COVERED ITEM OR SERVICE: HCPCS | Mod: GY | Performed by: ANESTHESIOLOGY

## 2017-08-30 PROCEDURE — 88305 TISSUE EXAM BY PATHOLOGIST: CPT | Performed by: COLON & RECTAL SURGERY

## 2017-08-30 PROCEDURE — 25000132 ZZH RX MED GY IP 250 OP 250 PS 637: Mod: GY | Performed by: COLON & RECTAL SURGERY

## 2017-08-30 PROCEDURE — 25000128 H RX IP 250 OP 636: Performed by: ANESTHESIOLOGY

## 2017-08-30 PROCEDURE — 25000128 H RX IP 250 OP 636: Performed by: NURSE ANESTHETIST, CERTIFIED REGISTERED

## 2017-08-30 PROCEDURE — 37000008 ZZH ANESTHESIA TECHNICAL FEE, 1ST 30 MIN: Performed by: COLON & RECTAL SURGERY

## 2017-08-30 PROCEDURE — A9270 NON-COVERED ITEM OR SERVICE: HCPCS | Mod: GY | Performed by: COLON & RECTAL SURGERY

## 2017-08-30 PROCEDURE — 25000125 ZZHC RX 250: Performed by: ANESTHESIOLOGY

## 2017-08-30 PROCEDURE — 93010 ELECTROCARDIOGRAM REPORT: CPT | Performed by: INTERNAL MEDICINE

## 2017-08-30 PROCEDURE — 25000125 ZZHC RX 250: Performed by: COLON & RECTAL SURGERY

## 2017-08-30 PROCEDURE — 36000053 ZZH SURGERY LEVEL 2 EA 15 ADDTL MIN - UMMC: Performed by: COLON & RECTAL SURGERY

## 2017-08-30 PROCEDURE — 82962 GLUCOSE BLOOD TEST: CPT

## 2017-08-30 PROCEDURE — 36000051 ZZH SURGERY LEVEL 2 1ST 30 MIN - UMMC: Performed by: COLON & RECTAL SURGERY

## 2017-08-30 PROCEDURE — 40000170 ZZH STATISTIC PRE-PROCEDURE ASSESSMENT II: Performed by: COLON & RECTAL SURGERY

## 2017-08-30 PROCEDURE — 27210794 ZZH OR GENERAL SUPPLY STERILE: Performed by: COLON & RECTAL SURGERY

## 2017-08-30 PROCEDURE — 40000065 ZZH STATISTIC EKG NON-CHARGEABLE

## 2017-08-30 PROCEDURE — 71000027 ZZH RECOVERY PHASE 2 EACH 15 MINS: Performed by: COLON & RECTAL SURGERY

## 2017-08-30 RX ORDER — ONDANSETRON 2 MG/ML
4 INJECTION INTRAMUSCULAR; INTRAVENOUS EVERY 30 MIN PRN
Status: DISCONTINUED | OUTPATIENT
Start: 2017-08-30 | End: 2017-08-30 | Stop reason: HOSPADM

## 2017-08-30 RX ORDER — ASPIRIN 81 MG
100 TABLET, DELAYED RELEASE (ENTERIC COATED) ORAL DAILY
Qty: 60 TABLET | Refills: 1 | Status: SHIPPED | OUTPATIENT
Start: 2017-08-30 | End: 2020-08-12

## 2017-08-30 RX ORDER — FENTANYL CITRATE 50 UG/ML
25-50 INJECTION, SOLUTION INTRAMUSCULAR; INTRAVENOUS EVERY 5 MIN PRN
Status: DISCONTINUED | OUTPATIENT
Start: 2017-08-30 | End: 2017-08-30 | Stop reason: HOSPADM

## 2017-08-30 RX ORDER — PROPOFOL 10 MG/ML
INJECTION, EMULSION INTRAVENOUS CONTINUOUS PRN
Status: DISCONTINUED | OUTPATIENT
Start: 2017-08-30 | End: 2017-08-30

## 2017-08-30 RX ORDER — FENTANYL CITRATE 50 UG/ML
INJECTION, SOLUTION INTRAMUSCULAR; INTRAVENOUS PRN
Status: DISCONTINUED | OUTPATIENT
Start: 2017-08-30 | End: 2017-08-30

## 2017-08-30 RX ORDER — CITRIC ACID/SODIUM CITRATE 334-500MG
30 SOLUTION, ORAL ORAL
Status: COMPLETED | OUTPATIENT
Start: 2017-08-30 | End: 2017-08-30

## 2017-08-30 RX ORDER — ACETIC ACID 5 %
LIQUID (ML) MISCELLANEOUS PRN
Status: DISCONTINUED | OUTPATIENT
Start: 2017-08-30 | End: 2017-08-30 | Stop reason: HOSPADM

## 2017-08-30 RX ORDER — PROPOFOL 10 MG/ML
INJECTION, EMULSION INTRAVENOUS PRN
Status: DISCONTINUED | OUTPATIENT
Start: 2017-08-30 | End: 2017-08-30

## 2017-08-30 RX ORDER — MORPHINE SULFATE 2 MG/ML
2-4 INJECTION, SOLUTION INTRAMUSCULAR; INTRAVENOUS EVERY 10 MIN PRN
Status: DISCONTINUED | OUTPATIENT
Start: 2017-08-30 | End: 2017-08-30 | Stop reason: HOSPADM

## 2017-08-30 RX ORDER — ONDANSETRON 4 MG/1
4 TABLET, ORALLY DISINTEGRATING ORAL EVERY 30 MIN PRN
Status: DISCONTINUED | OUTPATIENT
Start: 2017-08-30 | End: 2017-08-30 | Stop reason: HOSPADM

## 2017-08-30 RX ORDER — SODIUM CHLORIDE, SODIUM LACTATE, POTASSIUM CHLORIDE, CALCIUM CHLORIDE 600; 310; 30; 20 MG/100ML; MG/100ML; MG/100ML; MG/100ML
INJECTION, SOLUTION INTRAVENOUS CONTINUOUS
Status: DISCONTINUED | OUTPATIENT
Start: 2017-08-30 | End: 2017-08-30 | Stop reason: HOSPADM

## 2017-08-30 RX ORDER — NALOXONE HYDROCHLORIDE 0.4 MG/ML
.1-.4 INJECTION, SOLUTION INTRAMUSCULAR; INTRAVENOUS; SUBCUTANEOUS
Status: DISCONTINUED | OUTPATIENT
Start: 2017-08-30 | End: 2017-08-30 | Stop reason: HOSPADM

## 2017-08-30 RX ORDER — BUPIVACAINE HYDROCHLORIDE AND EPINEPHRINE 5; 5 MG/ML; UG/ML
INJECTION, SOLUTION PERINEURAL PRN
Status: DISCONTINUED | OUTPATIENT
Start: 2017-08-30 | End: 2017-08-30 | Stop reason: HOSPADM

## 2017-08-30 RX ORDER — MEPERIDINE HYDROCHLORIDE 25 MG/ML
12.5 INJECTION INTRAMUSCULAR; INTRAVENOUS; SUBCUTANEOUS
Status: DISCONTINUED | OUTPATIENT
Start: 2017-08-30 | End: 2017-08-30 | Stop reason: HOSPADM

## 2017-08-30 RX ORDER — CHLORHEXIDINE GLUCONATE 40 MG/ML
SOLUTION TOPICAL ONCE
Status: DISCONTINUED | OUTPATIENT
Start: 2017-08-30 | End: 2017-08-30 | Stop reason: HOSPADM

## 2017-08-30 RX ORDER — OXYCODONE HYDROCHLORIDE 5 MG/1
5 TABLET ORAL EVERY 4 HOURS PRN
Qty: 18 TABLET | Refills: 0 | Status: ON HOLD | OUTPATIENT
Start: 2017-08-30 | End: 2018-03-14

## 2017-08-30 RX ORDER — ONDANSETRON 2 MG/ML
INJECTION INTRAMUSCULAR; INTRAVENOUS PRN
Status: DISCONTINUED | OUTPATIENT
Start: 2017-08-30 | End: 2017-08-30

## 2017-08-30 RX ORDER — ALBUTEROL SULFATE 0.83 MG/ML
2.5 SOLUTION RESPIRATORY (INHALATION) ONCE
Status: COMPLETED | OUTPATIENT
Start: 2017-08-30 | End: 2017-08-30

## 2017-08-30 RX ORDER — ACETAMINOPHEN 325 MG/1
975 TABLET ORAL ONCE
Status: COMPLETED | OUTPATIENT
Start: 2017-08-30 | End: 2017-08-30

## 2017-08-30 RX ADMIN — PHENYLEPHRINE HYDROCHLORIDE 100 MCG: 10 INJECTION, SOLUTION INTRAMUSCULAR; INTRAVENOUS; SUBCUTANEOUS at 08:32

## 2017-08-30 RX ADMIN — ALBUTEROL SULFATE 2.5 MG: 2.5 SOLUTION RESPIRATORY (INHALATION) at 07:50

## 2017-08-30 RX ADMIN — BUPIVACAINE HYDROCHLORIDE AND EPINEPHRINE BITARTRATE 40 ML: 5; .005 INJECTION, SOLUTION EPIDURAL; INTRACAUDAL; PERINEURAL at 08:37

## 2017-08-30 RX ADMIN — Medication 60 ML: at 09:03

## 2017-08-30 RX ADMIN — SODIUM CITRATE AND CITRIC ACID MONOHYDRATE 30 ML: 500; 334 SOLUTION ORAL at 07:50

## 2017-08-30 RX ADMIN — MIDAZOLAM HYDROCHLORIDE 2 MG: 1 INJECTION, SOLUTION INTRAMUSCULAR; INTRAVENOUS at 08:00

## 2017-08-30 RX ADMIN — ACETAMINOPHEN 975 MG: 325 TABLET, FILM COATED ORAL at 07:50

## 2017-08-30 RX ADMIN — PHENYLEPHRINE HYDROCHLORIDE 100 MCG: 10 INJECTION, SOLUTION INTRAMUSCULAR; INTRAVENOUS; SUBCUTANEOUS at 08:42

## 2017-08-30 RX ADMIN — ONDANSETRON 4 MG: 2 INJECTION INTRAMUSCULAR; INTRAVENOUS at 08:52

## 2017-08-30 RX ADMIN — PHENYLEPHRINE HYDROCHLORIDE 100 MCG: 10 INJECTION, SOLUTION INTRAMUSCULAR; INTRAVENOUS; SUBCUTANEOUS at 08:52

## 2017-08-30 RX ADMIN — PROPOFOL 30 MG: 10 INJECTION, EMULSION INTRAVENOUS at 08:29

## 2017-08-30 RX ADMIN — FENTANYL CITRATE 25 MCG: 50 INJECTION, SOLUTION INTRAMUSCULAR; INTRAVENOUS at 08:32

## 2017-08-30 RX ADMIN — PROPOFOL 30 MG: 10 INJECTION, EMULSION INTRAVENOUS at 08:30

## 2017-08-30 RX ADMIN — PROPOFOL 75 MCG/KG/MIN: 10 INJECTION, EMULSION INTRAVENOUS at 08:21

## 2017-08-30 RX ADMIN — SODIUM CHLORIDE, POTASSIUM CHLORIDE, SODIUM LACTATE AND CALCIUM CHLORIDE: 600; 310; 30; 20 INJECTION, SOLUTION INTRAVENOUS at 07:52

## 2017-08-30 NOTE — IP AVS SNAPSHOT
Same Day Surgery [address] [address]    Phone:  [phone]                                       After Visit Summary   8/30/2017    Anselmo Rainey    MRN: [id number]           After Visit Summary Signature Page     I have received my discharge instructions, and my questions have been answered. I have discussed any challenges I see with this plan with the nurse or doctor.    ..........................................................................................................................................  Patient/Patient Representative Signature      ..........................................................................................................................................  Patient Representative Print Name and Relationship to Patient    ..................................................               ................................................  Date                                            Time    ..........................................................................................................................................  Reviewed by Signature/Title    ...................................................              ..............................................  Date                                                            Time

## 2017-08-30 NOTE — ANESTHESIA CARE TRANSFER NOTE
Patient: Anselmo Rainey    Procedure(s):  Examination Under anesthesia, Anal Microscopy With Biopsies, fulguration    - Wound Class: II-Clean Contaminated    Diagnosis: Anal Dysplasia   Diagnosis Additional Information: No value filed.    Anesthesia Type:   MAC     Note:  Airway :Room Air  Patient transferred to:Phase II  Comments: VSS. Report to RN. 100%. 118/62. Patient awake.      Vitals: (Last set prior to Anesthesia Care Transfer)    CRNA VITALS  8/30/2017 0828 - 8/30/2017 0906      8/30/2017             Pulse: 74    SpO2: 99 %                Electronically Signed By: DESTINEY Montesinos CRNA  August 30, 2017  9:06 AM

## 2017-08-30 NOTE — ANESTHESIA POSTPROCEDURE EVALUATION
Patient: Anselmo CARCAMO Pitleclaura    Procedure(s):  Examination Under anesthesia, Anal Microscopy With Biopsies, fulguration    - Wound Class: II-Clean Contaminated    Diagnosis:Anal Dysplasia   Diagnosis Additional Information: No value filed.    Anesthesia Type:  MAC    Note:  Anesthesia Post Evaluation    Patient location during evaluation: Phase 2  Patient participation: Able to fully participate in evaluation  Level of consciousness: awake and alert  Pain management: adequate  Airway patency: patent  Cardiovascular status: acceptable  Respiratory status: acceptable  Hydration status: acceptable  PONV: none     Anesthetic complications: None    Comments: I spoke with cardiology for consultation about his rhythm. 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 They have cleared him to go home,with follow-up with his physicians.        Last vitals:  Vitals:    08/30/17 0622 08/30/17 0900   BP: 133/69 118/62   Resp: 16 16   Temp: 36.6  C (97.9  F) 36.5  C (97.7  F)   SpO2: 98% 98%         Electronically Signed By: Duane F. Szczepanski, MD  August 30, 2017  9:18 AM

## 2017-08-30 NOTE — IP AVS SNAPSHOT
MRN:[id number]                      After Visit Summary   8/30/2017    Anselmo Rainey    MRN: [id number]           Thank you!     Thank you for choosing [facility] for your care. Our goal is always to provide you with excellent care. Hearing back from our patients is one way we can continue to improve our services. Please take a few minutes to complete the written survey that you may receive in the mail after you visit with us. Thank you!        Patient Information     Date Of Birth          1950        About your hospital stay     You were admitted on:  August 30, 2017 You last received care in the:  Same Day Surgery [facility]    You were discharged on:  August 30, 2017       Who to Call     For medical emergencies, please call 911.  For non-urgent questions about your medical care, please call your primary care provider or clinic, None  For questions related to your surgery, please call your surgery clinic        Attending Provider     Provider Sarbjit Vallecillo MD Colon and Rectal Surgery       Primary Care Provider Fax #    [facility] [phone]      After Care Instructions     Diet       Follow this diet upon discharge: Regular                  Further instructions from your care team       [facility]  Same-Day Surgery   Adult Discharge Orders & Instructions     For 24 hours after surgery    1. Get plenty of rest.  A responsible adult must stay with you for at least 24 hours after you leave the hospital.   2. Do not drive or use heavy equipment.  If you have weakness or tingling, don't drive or use heavy equipment until this feeling goes away.  3. Do not drink alcohol.  4. Avoid strenuous or risky activities.  Ask for help when climbing stairs.   5. You may feel lightheaded.  IF so, sit for a few minutes before standing.  Have someone help you get up.   6. If you have nausea (feel sick to your stomach): Drink only  clear liquids such as apple juice, ginger ale, broth or 7-Up.  Rest may also help.  Be sure to drink enough fluids.  Move to a regular diet as you feel able.  7. You may have a slight fever. Call the doctor if your fever is over 100 F (37.7 C) (taken under the tongue) or lasts longer than 24 hours.  8. You may have a dry mouth, a sore throat, muscle aches or trouble sleeping.  These should go away after 24 hours.  9. Do not make important or legal decisions.   Call your doctor for any of the followin.  Signs of infection (fever, growing tenderness at the surgery site, a large amount of drainage or bleeding, severe pain, foul-smelling drainage, redness, swelling).    2. It has been over 8 to 10 hours since surgery and you are still not able to urinate (pass water).    3.  Headache for over 24 hours.    4.  Numbness, tingling or weakness the day after surgery (if you had spinal anesthesia).  To contact a doctor, call Dr Mai's office at [phone]  or:        [phone] and ask for the resident on call for          [place]/Rectal Surgery (answered 24 hours a day)      Emergency Department:    [facility]: [phone]       (TTY for hearing impaired: [phone])        Anorectal Surgery Instructions    What can I expect after anorectal surgery?  Most anorectal procedures are done as outpatient surgery, and you go home the same day as the procedure. A few surgical procedures will require that you stay in the hospital for about one to three days. No matter where the procedure is done or how long or short it takes, these recommendations will help you heal and feel more comfortable.    Medicines:  The anal area is very sensitive; you can expect to have some pain for up to 2-4 weeks after the procedure. Your doctor will give you a prescription for one or more pain medications.    Take naprosyn 500 mg twice a day OR ibuprofen 600 mg four times a day     Take this on a regular basis (not as needed)  following your surgery.     The drugs are best taken with food.  Do not take if it causes stomach upset or if you have a history of ulcers or gastritis. You can stop the naprosyn (or ibuprofen) or reduce the dose when you are feeling better.    DO NOT use naprosyn, ibuprofen, or other similar agents (eg. Advil or Aleve) if you have inflammatory bowel disease (Ulcerative Colitis or Crohn's disease) or if your doctor as advised you against using these medications    Take acetominaphen (Tylenol) 650-1000 mg four times a day.     Take this on a regular basis (not as needed) following surgery for pain control.     Take the lower dose if you are >65 years old or have liver disease. The maximum dose of acetominaphen is 4000 mg a day. You can stop the acetaminophen or reduce the dose when you are feeling better.    It is important to realize that many narcotic pain relievers (including vicodin, percocet, tylenol #3) also have acetaminophen, and excessive doses of acetaminophen can be dangerous, so do not take these in addition to acetominaphen.  You may take narcotics that don't contain acetominaphen such as oxycodone.      Take oxycodone AS NEEDED in addition to the acetominaphen and naprosyn.      Because narcotics have side effects (including constipation), you should reduce your use of these medications as tolerated as your pain improves.    *In general, the best strategy is to take (if you are able to tolerate it) the tylenol and naprosen on a regular basis until your pain has largely gone away. You can take the narcotic pain medicine as needed in addition to the tylenol and ibuprofen. As your pain begins to lessen, you should cut back on your narcotic use while continuing to take your regular tylenol and naprosyn doses.      Refilling prescriptions. If you need additional pain medication, please call the triage nurse at [phone] during normal business hours (8 a.m. to 4 p.m., Monday though Friday) or have your  pharmacy fax a refill request to [phone]. If you call after hours or on the weekends, the doctor on call may not know you personally and may not renew narcotic pain medication by phone. Call your primary care provider for all other medication refills.    Perineal care:  Tub baths:    If possible, take a tub bath immediately after each bowel movement.     Baths should be take at least 3 times daily for the first week to 10 days following your procedure. You should soak in the tub for 10 to 15 minutes each time with water as warm as you can tolerate.     Even after you go back to work, it is a good idea to sit in the tub in the morning, after returning from work, and again in the evening before bedtime.    Bleeding/Infection:    You can expect to have some bleeding after bowel movements, but it should stop soon after you wipe.     Use a wet cloth or perianal pad (Tucks or Preparation H pads) to gently wipe the area after each bowel movement.    Do not rub the anal area or use a lot of pressure.    Using a spray bottle filled with warm water helps loosen any remaining stool. Blot gently with a soft dry cloth or tissue paper.    Infection around the anal opening is not very common. The anal area has excellent blood supply, which helps the area to heal. Bloody discharge after bowel movements is normal and may last 2 to 4 weeks after your surgery. However, if you bleed between bowel movements and cannot get it to stop, call the triage nurse immediately [phone].    Bowel function:  Take a fiber supplement such as Metamucil, which is over the counter. It is important to drink six to eight glasses of water or juice everyday when using fiber products.    If you do not have a bowel movement after 1-2 days:    Take Milk of Magnesia-2 tablespoons.       If there are no results, repeat this or add over the counter Miralax.      If you still do not have results, contact the clinic.     If there are no results, repeat  this. Stop taking Milk of Magnesia or other laxatives if you begin to have diarrhea.    * Constipation will cause you to strain when you have a bowel movement. The hard stool will be difficult to pass, will increase pain and bleeding, and will slow down healing.  Try to avoid constipation and/or diarrhea as this can make the pain and bleeding worse.    * It is important to have regular bowel movements at least every other day and to keep your stool soft.  A high fiber diet, including at least four servings of fruits or vegetables daily, will help to keep your bowel movements regular and soft.    Activity:  After your procedure, there are no restrictions on your activity     except restrictions surrounding being on narcotics and in pain, such as no heavy machine operating or driving.     You may walk, climb stairs, ride in a car, and sit as tolerated.     It is helpful to avoid sitting in one position for long periods (2 or more hours).    After some surgeries, you may be told not to perform any lifting (more than 10 pounds) for several weeks after surgery.    When to call:  When do I need to call the doctor or triage nurse?    If you experience any of the problems listed here, call our triage nurse during business hours ([phone]).     The nurse will help you with your problem or have the doctor call you.     After hours and on weekends, please call the main hospital number ([phone]) and ask for the colon and rectal surgery person on call.     Some is available to help you 24 hours a day, seven days a week.    Call for:   ? Fever greater than 101 degrees   ? Chills   ? Foul-smelling drainage   ? Nausea and vomiting   ? Diarrhea - greater than 3 water stools in 24 hours   ? Constipation - no bowel movement after 3 days   ? Severe bleeding that does not stop soon after a bowel movement   ? Problems with the incision, including increased pain, swelling, or redness        Pending Results     Date and Time Order  "Name Status Description    2017 0721 EKG 12-lead, tracing only Preliminary             Admission Information     Date & Time Provider Department Dept. Phone    2017 Sarbjit Mai MD Same Day Surgery [facility] [phone]      Your Vitals Were     Blood Pressure Temperature Respirations Height Weight Pulse Oximetry    118/62 97.7  F (36.5  C) (Oral) 16 1.702 m (5' 7\") 130.7 kg (288 lb 2.3 oz) 98%    BMI (Body Mass Index)                   45.13 kg/m2           [facility] Information     [facility] lets you send messages to your doctor, view your test results, renew your prescriptions, schedule appointments and more. To sign up, go to [email or web address] . Click on \"Log in\" on the left side of the screen, which will take you to the Welcome page. Then click on \"Sign up Now\" on the right side of the page.     You will be asked to enter the access code listed below, as well as some personal information. Please follow the directions to create your username and password.     Your access code is: [email or web address]  Expires: 2017  8:59 AM     Your access code will  in 90 days. If you need help or a new code, please call your [facility] clinic or [phone].        Care EveryWhere ID     This is your Care EveryWhere ID. This could be used by other organizations to access your [facility] medical records  [id number]        Equal Access to Services     CARLOS STORM AH: Hadii ector ku hadasho Soomaali, waaxda luqadaha, qaybta kaalmada adeegyada, waxmary lacho shah adekate kirby . So [facility] [phone].    ATENCIÓN: Si habla español, tiene a lyman disposición servicios gratuitos de asistencia lingüística. Llame al [phone].    We comply with applicable federal civil rights laws and [place] laws. We do not discriminate on the basis of race, color, national origin, age, disability sex, sexual orientation or gender identity.               Review of your medicines      START taking        Dose / Directions "    docusate sodium 100 MG tablet   Commonly known as:  COLACE   Used for:  Drug-induced constipation        Dose:  100 mg   Take 100 mg by mouth daily   Quantity:  60 tablet   Refills:  1       oxyCODONE 5 MG IR tablet   Commonly known as:  ROXICODONE   Used for:  Anal lesion        Dose:  5 mg   Take 1 tablet (5 mg) by mouth every 4 hours as needed for pain Maximum 5 tablet(s) per day   Quantity:  18 tablet   Refills:  0         CONTINUE these medicines which have NOT CHANGED        Dose / Directions    ASPIRIN PO        Dose:  81 mg   Take 81 mg by mouth daily   Refills:  0       buPROPion 75 MG tablet   Commonly known as:  WELLBUTRIN        Dose:  150 mg   Take 150 mg by mouth 3 times daily   Refills:  0       cholecalciferol 1000 UNIT tablet   Commonly known as:  vitamin D        Dose:  2000 Units   Take 2,000 Units by mouth 3 times daily (with meals)   Refills:  0       CLONAZEPAM PO        Dose:  0.5 mg   Take 0.5 mg by mouth as needed for anxiety   Refills:  0       clotrimazole 1 % cream   Commonly known as:  LOTRIMIN        Apply topically 2 times daily   Refills:  0       desonide 0.05 % cream   Commonly known as:  DESOWEN        Apply topically 2 times daily   Refills:  0       ferrous sulfate 325 (65 FE) MG tablet   Commonly known as:  IRON        Take by mouth 3 times daily (with meals)   Refills:  0       fluocinonide 0.05 % cream   Commonly known as:  LIDEX        Apply topically 2 times daily   Refills:  0       FUROSEMIDE PO   Indication:  Edema        Dose:  40 mg   Take 40 mg by mouth daily   Refills:  0       glipiZIDE 10 MG tablet   Commonly known as:  GLUCOTROL        Dose:  10 mg   Take 10 mg by mouth 2 times daily (before meals) 2 tabs bid   Refills:  0       glucose 4 G Chew chewable tablet        Dose:  3 tablet   Take 3 tablets by mouth daily as needed   Refills:  0       insulin glargine 100 UNIT/ML injection   Commonly known as:  LANTUS        Dose:  46 Units   Inject 46 Units  Subcutaneous every morning   Refills:  0       isosorbide mononitrate 30 MG 24 hr tablet   Commonly known as:  IMDUR        Dose:  30 mg   Take 30 mg by mouth daily   Refills:  0       loperamide 2 MG capsule   Commonly known as:  IMODIUM        Dose:  2 mg   Take 2 mg by mouth 4 times daily as needed   Refills:  0       losartan 50 MG tablet   Commonly known as:  COZAAR        Dose:  100 mg   Take 100 mg by mouth daily   Refills:  0       metFORMIN 850 MG tablet   Commonly known as:  GLUCOPHAGE        Dose:  850 mg   Take 850 mg by mouth 2 times daily (with meals)   Refills:  0       miconazole 2 % powder   Commonly known as:  MICATIN; MICRO GUARD        Apply topically as needed   Refills:  0       mometasone 220 MCG/INH Inhaler   Commonly known as:  ASMANEX        Dose:  2 puff   Inhale 2 puffs into the lungs 2 times daily   Refills:  0       nitroGLYcerin 0.4 MG sublingual tablet   Commonly known as:  NITROSTAT        Place under the tongue every 5 minutes as needed   Refills:  0       OMEPRAZOLE PO        Dose:  20 mg   Take 20 mg by mouth 2 tabs bid   Refills:  0       orlistat 120 MG capsule   Commonly known as:  XENICAL        Dose:  120 mg   Take 120 mg by mouth 3 times daily (with meals)   Refills:  0       PROAIR  (90 BASE) MCG/ACT Inhaler   Generic drug:  albuterol        Dose:  2 puff   Inhale 2 puffs into the lungs every 4 hours as needed   Refills:  0       simvastatin 40 MG tablet   Commonly known as:  ZOCOR        Dose:  20 mg   Take 20 mg by mouth At Bedtime   Refills:  0       TEGRETOL PO        Dose:  400 mg   Take 400 mg by mouth 4 times daily   Refills:  0       TERAZOSIN HCL PO        Dose:  2 mg   Take 2 mg by mouth daily   Refills:  0       TOPIRAMATE PO        Dose:  50 mg   Take 50 mg by mouth daily   Refills:  0       triamcinolone 0.1 % cream   Commonly known as:  KENALOG        Apply topically 2 times daily   Refills:  0            Where to get your medicines      These  medications were sent to [facility] - [place], [place] - [address]  [address], [facility] [address]     Phone:  [phone]     docusate sodium 100 MG tablet         Some of these will need a paper prescription and others can be bought over the counter. Ask your nurse if you have questions.     Bring a paper prescription for each of these medications     oxyCODONE 5 MG IR tablet                Protect others around you: Learn how to safely use, store and throw away your medicines at www.disposemymeds.org.             Medication List: This is a list of all your medications and when to take them. Check marks below indicate your daily home schedule. Keep this list as a reference.      Medications           Morning Afternoon Evening Bedtime As Needed    ASPIRIN PO   Take 81 mg by mouth daily                                buPROPion 75 MG tablet   Commonly known as:  WELLBUTRIN   Take 150 mg by mouth 3 times daily                                cholecalciferol 1000 UNIT tablet   Commonly known as:  vitamin D   Take 2,000 Units by mouth 3 times daily (with meals)                                CLONAZEPAM PO   Take 0.5 mg by mouth as needed for anxiety                                clotrimazole 1 % cream   Commonly known as:  LOTRIMIN   Apply topically 2 times daily                                desonide 0.05 % cream   Commonly known as:  DESOWEN   Apply topically 2 times daily                                docusate sodium 100 MG tablet   Commonly known as:  COLACE   Take 100 mg by mouth daily                                ferrous sulfate 325 (65 FE) MG tablet   Commonly known as:  IRON   Take by mouth 3 times daily (with meals)                                fluocinonide 0.05 % cream   Commonly known as:  LIDEX   Apply topically 2 times daily                                FUROSEMIDE PO   Take 40 mg by mouth daily                                glipiZIDE 10 MG tablet   Commonly  known as:  GLUCOTROL   Take 10 mg by mouth 2 times daily (before meals) 2 tabs bid                                glucose 4 G Chew chewable tablet   Take 3 tablets by mouth daily as needed                                insulin glargine 100 UNIT/ML injection   Commonly known as:  LANTUS   Inject 46 Units Subcutaneous every morning                                isosorbide mononitrate 30 MG 24 hr tablet   Commonly known as:  IMDUR   Take 30 mg by mouth daily                                loperamide 2 MG capsule   Commonly known as:  IMODIUM   Take 2 mg by mouth 4 times daily as needed                                losartan 50 MG tablet   Commonly known as:  COZAAR   Take 100 mg by mouth daily                                metFORMIN 850 MG tablet   Commonly known as:  GLUCOPHAGE   Take 850 mg by mouth 2 times daily (with meals)                                miconazole 2 % powder   Commonly known as:  MICATIN; MICRO GUARD   Apply topically as needed                                mometasone 220 MCG/INH Inhaler   Commonly known as:  ASMANEX   Inhale 2 puffs into the lungs 2 times daily                                nitroGLYcerin 0.4 MG sublingual tablet   Commonly known as:  NITROSTAT   Place under the tongue every 5 minutes as needed                                OMEPRAZOLE PO   Take 20 mg by mouth 2 tabs bid                                orlistat 120 MG capsule   Commonly known as:  XENICAL   Take 120 mg by mouth 3 times daily (with meals)                                oxyCODONE 5 MG IR tablet   Commonly known as:  ROXICODONE   Take 1 tablet (5 mg) by mouth every 4 hours as needed for pain Maximum 5 tablet(s) per day                                PROAIR  (90 BASE) MCG/ACT Inhaler   Inhale 2 puffs into the lungs every 4 hours as needed   Generic drug:  albuterol                                simvastatin 40 MG tablet   Commonly known as:  ZOCOR   Take 20 mg by mouth At Bedtime                                 TEGRETOL PO   Take 400 mg by mouth 4 times daily                                TERAZOSIN HCL PO   Take 2 mg by mouth daily                                TOPIRAMATE PO   Take 50 mg by mouth daily                                triamcinolone 0.1 % cream   Commonly known as:  KENALOG   Apply topically 2 times daily

## 2017-08-30 NOTE — DISCHARGE INSTRUCTIONS
[facility]  Same-Day Surgery   Adult Discharge Orders & Instructions     For 24 hours after surgery    1. Get plenty of rest.  A responsible adult must stay with you for at least 24 hours after you leave the hospital.   2. Do not drive or use heavy equipment.  If you have weakness or tingling, don't drive or use heavy equipment until this feeling goes away.  3. Do not drink alcohol.  4. Avoid strenuous or risky activities.  Ask for help when climbing stairs.   5. You may feel lightheaded.  IF so, sit for a few minutes before standing.  Have someone help you get up.   6. If you have nausea (feel sick to your stomach): Drink only clear liquids such as apple juice, ginger ale, broth or 7-Up.  Rest may also help.  Be sure to drink enough fluids.  Move to a regular diet as you feel able.  7. You may have a slight fever. Call the doctor if your fever is over 100 F (37.7 C) (taken under the tongue) or lasts longer than 24 hours.  8. You may have a dry mouth, a sore throat, muscle aches or trouble sleeping.  These should go away after 24 hours.  9. Do not make important or legal decisions.   Call your doctor for any of the followin.  Signs of infection (fever, growing tenderness at the surgery site, a large amount of drainage or bleeding, severe pain, foul-smelling drainage, redness, swelling).    2. It has been over 8 to 10 hours since surgery and you are still not able to urinate (pass water).    3.  Headache for over 24 hours.    4.  Numbness, tingling or weakness the day after surgery (if you had spinal anesthesia).  To contact a doctor, call Dr Mai's office at [phone]  or:        [phone] and ask for the resident on call for          [place]/Rectal Surgery (answered 24 hours a day)      Emergency Department:    [facility]: [phone]       (TTY for hearing impaired: [phone])        Anorectal Surgery Instructions    What can I expect after anorectal  surgery?  Most anorectal procedures are done as outpatient surgery, and you go home the same day as the procedure. A few surgical procedures will require that you stay in the hospital for about one to three days. No matter where the procedure is done or how long or short it takes, these recommendations will help you heal and feel more comfortable.    Medicines:  The anal area is very sensitive; you can expect to have some pain for up to 2-4 weeks after the procedure. Your doctor will give you a prescription for one or more pain medications.    Take naprosyn 500 mg twice a day OR ibuprofen 600 mg four times a day     Take this on a regular basis (not as needed) following your surgery.     The drugs are best taken with food.  Do not take if it causes stomach upset or if you have a history of ulcers or gastritis. You can stop the naprosyn (or ibuprofen) or reduce the dose when you are feeling better.    DO NOT use naprosyn, ibuprofen, or other similar agents (eg. Advil or Aleve) if you have inflammatory bowel disease (Ulcerative Colitis or Crohn's disease) or if your doctor as advised you against using these medications    Take acetominaphen (Tylenol) 650-1000 mg four times a day.     Take this on a regular basis (not as needed) following surgery for pain control.     Take the lower dose if you are >65 years old or have liver disease. The maximum dose of acetominaphen is 4000 mg a day. You can stop the acetaminophen or reduce the dose when you are feeling better.    It is important to realize that many narcotic pain relievers (including vicodin, percocet, tylenol #3) also have acetaminophen, and excessive doses of acetaminophen can be dangerous, so do not take these in addition to acetominaphen.  You may take narcotics that don't contain acetominaphen such as oxycodone.      Take oxycodone AS NEEDED in addition to the acetominaphen and naprosyn.      Because narcotics have side effects (including constipation), you  should reduce your use of these medications as tolerated as your pain improves.    *In general, the best strategy is to take (if you are able to tolerate it) the tylenol and naprosen on a regular basis until your pain has largely gone away. You can take the narcotic pain medicine as needed in addition to the tylenol and ibuprofen. As your pain begins to lessen, you should cut back on your narcotic use while continuing to take your regular tylenol and naprosyn doses.      Refilling prescriptions. If you need additional pain medication, please call the triage nurse at [phone] during normal business hours (8 a.m. to 4 p.m., Monday though Friday) or have your pharmacy fax a refill request to [phone]. If you call after hours or on the weekends, the doctor on call may not know you personally and may not renew narcotic pain medication by phone. Call your primary care provider for all other medication refills.    Perineal care:  Tub baths:    If possible, take a tub bath immediately after each bowel movement.     Baths should be take at least 3 times daily for the first week to 10 days following your procedure. You should soak in the tub for 10 to 15 minutes each time with water as warm as you can tolerate.     Even after you go back to work, it is a good idea to sit in the tub in the morning, after returning from work, and again in the evening before bedtime.    Bleeding/Infection:    You can expect to have some bleeding after bowel movements, but it should stop soon after you wipe.     Use a wet cloth or perianal pad (Tucks or Preparation H pads) to gently wipe the area after each bowel movement.    Do not rub the anal area or use a lot of pressure.    Using a spray bottle filled with warm water helps loosen any remaining stool. Blot gently with a soft dry cloth or tissue paper.    Infection around the anal opening is not very common. The anal area has excellent blood supply, which helps the area to heal. Bloody  discharge after bowel movements is normal and may last 2 to 4 weeks after your surgery. However, if you bleed between bowel movements and cannot get it to stop, call the triage nurse immediately [phone].    Bowel function:  Take a fiber supplement such as Metamucil, which is over the counter. It is important to drink six to eight glasses of water or juice everyday when using fiber products.    If you do not have a bowel movement after 1-2 days:    Take Milk of Magnesia-2 tablespoons.       If there are no results, repeat this or add over the counter Miralax.      If you still do not have results, contact the clinic.     If there are no results, repeat this. Stop taking Milk of Magnesia or other laxatives if you begin to have diarrhea.    * Constipation will cause you to strain when you have a bowel movement. The hard stool will be difficult to pass, will increase pain and bleeding, and will slow down healing.  Try to avoid constipation and/or diarrhea as this can make the pain and bleeding worse.    * It is important to have regular bowel movements at least every other day and to keep your stool soft.  A high fiber diet, including at least four servings of fruits or vegetables daily, will help to keep your bowel movements regular and soft.    Activity:  After your procedure, there are no restrictions on your activity     except restrictions surrounding being on narcotics and in pain, such as no heavy machine operating or driving.     You may walk, climb stairs, ride in a car, and sit as tolerated.     It is helpful to avoid sitting in one position for long periods (2 or more hours).    After some surgeries, you may be told not to perform any lifting (more than 10 pounds) for several weeks after surgery.    When to call:  When do I need to call the doctor or triage nurse?    If you experience any of the problems listed here, call our triage nurse during business hours ([phone]).     The nurse will help you  with your problem or have the doctor call you.     After hours and on weekends, please call the main hospital number ([phone]) and ask for the colon and rectal surgery person on call.     Some is available to help you 24 hours a day, seven days a week.    Call for:   ? Fever greater than 101 degrees   ? Chills   ? Foul-smelling drainage   ? Nausea and vomiting   ? Diarrhea - greater than 3 water stools in 24 hours   ? Constipation - no bowel movement after 3 days   ? Severe bleeding that does not stop soon after a bowel movement   ? Problems with the incision, including increased pain, swelling, or redness

## 2017-08-30 NOTE — OP NOTE
DATE OF SERVICE:  08/30/2017      PREOPERATIVE DIAGNOSIS:  Proctitis; anal ulcer; history of high grade anal dysplasia.      POSTOPERATIVE DIAGNOSIS:  Proctitis; anal ulcer; history of high grade anal dysplasia.      PROCEDURE:  Examination under anesthesia with high-definition anal microscopy, biopsies and fulguration.      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  He was diagnosed with anal dysplasia and has had several previous anal microscopies.  He has had a longstanding anal ulcer, biopsies which were negative on his last exam.  However, he did have a focus of high-grade dysplasia at the anal margin at his last microscopy.  He now returns for repeat examination with anal microscopy and directed therapy.  All risks and alternatives were outlined in detail with the patient.  I answered all his questions to his stated satisfaction.  He expressed understanding and provided informed consent.      SURGEON:  Sarbjit Mai MD      ASSISTANT:  Ranjit Foster MD      DESCRIPTION OF PROCEDURE:  With the patient in the left lateral decubitus position and the buttocks taped apart, under intravenous sedation by Anesthesia, the perianal skin was prepped and draped in a sterile fashion.  A timeout was performed and the patient and procedure confirmed.  I soaked the anal canal and perianal skin with 5% acetic acid and perform high-definition anal microscopy using a colposcope.  The perianal skin was normal and had no evidence of dysplasia, other than very mild patchy acetowhitening.  There was nothing to suggest a high-grade dysplasia.  Digital rectal examination was normal.  On anoscopy, there was a chronic posterior midline anal ulcer with a clean granulating base.  At the distal margin of the ulcer, there were 2 foci of intensely acetowhite epithelium, one of which had a small patch of mosaicism.   Each of these foci measured only 3-4 mm in diameter.  I obtained biopsies of each and destroyed all the surrounding tissue using electrocautery.  There was moderate proctitis in the visible rectal mucosa, which was friable.  The dentate line was ill-defined.  There was no significant acetowhitening and no other microvascular changes were seen.  I ensured hemostasis and completed the procedure.  Estimated blood loss was 5 mL.  The patient tolerated the procedure well without evident complications.  Sponge, needle and instrument counts were reported as correct at the conclusion of the case x2.     Because of the patient's body habitus and proctitis, he is rather more difficult to examine without anesthesia.  I'll plan a repeat anal microscopy under anesthesia and 4 months.        BHANU DA SILVA MD             D: 2017 09:03   T: 2017 09:55   MT: XI      Name:     DEMIAN VALDEZ   MRN:      [id number]        Account:        [id number]   :      1950           Procedure Date: 2017      Document: [id number]       cc: OTILIO ARMSTRONG MD       [facility] Surgery Billing

## 2017-08-31 LAB — COPATH REPORT: NORMAL

## 2017-09-01 ENCOUNTER — TELEPHONE (OUTPATIENT)
Dept: SURGERY | Facility: CLINIC | Age: 67
End: 2017-09-01

## 2017-09-01 LAB — INTERPRETATION ECG - MUSE: NORMAL

## 2017-09-01 NOTE — TELEPHONE ENCOUNTER
Called to discuss negative pathology results. Plan for next EUA to be in 6 months. Patient's questions were answered to his stated satisfaction and he is in agreement with this plan.    DESTINEY Gomez, NP-C  Colon and Rectal Surgery  [facility]

## 2018-02-22 DIAGNOSIS — K62.82 ANAL DYSPLASIA: Primary | ICD-10-CM

## 2018-02-26 ENCOUNTER — MEDICAL CORRESPONDENCE (OUTPATIENT)
Dept: HEALTH INFORMATION MANAGEMENT | Facility: CLINIC | Age: 68
End: 2018-02-26

## 2018-02-27 ENCOUNTER — TELEPHONE (OUTPATIENT)
Dept: SURGERY | Facility: CLINIC | Age: 68
End: 2018-02-27

## 2018-02-27 ENCOUNTER — TRANSFERRED RECORDS (OUTPATIENT)
Dept: HEALTH INFORMATION MANAGEMENT | Facility: CLINIC | Age: 68
End: 2018-02-27

## 2018-02-27 NOTE — TELEPHONE ENCOUNTER
Called patient to inform him at that [facility].  [facility]  Provided our direct fax number where H&P can be sent.  Informed patient I will get his surgery packet mailed out to him.  Patient verbalized understanding, and has my direct number for questions or concerns.

## 2018-03-14 ENCOUNTER — ANESTHESIA EVENT (OUTPATIENT)
Dept: SURGERY | Facility: CLINIC | Age: 68
End: 2018-03-14
Payer: COMMERCIAL

## 2018-03-14 ENCOUNTER — HOSPITAL ENCOUNTER (OUTPATIENT)
Facility: CLINIC | Age: 68
Discharge: HOME | End: 2018-03-14
Attending: COLON & RECTAL SURGERY | Admitting: COLON & RECTAL SURGERY
Payer: COMMERCIAL

## 2018-03-14 ENCOUNTER — ANESTHESIA (OUTPATIENT)
Dept: SURGERY | Facility: CLINIC | Age: 68
End: 2018-03-14
Payer: COMMERCIAL

## 2018-03-14 VITALS
SYSTOLIC BLOOD PRESSURE: 127 MMHG | BODY MASS INDEX: 45.63 KG/M2 | HEIGHT: 66 IN | RESPIRATION RATE: 18 BRPM | DIASTOLIC BLOOD PRESSURE: 67 MMHG | OXYGEN SATURATION: 100 % | TEMPERATURE: 97.7 F | WEIGHT: 283.95 LBS

## 2018-03-14 DIAGNOSIS — K62.82 ANAL DYSPLASIA: Primary | ICD-10-CM

## 2018-03-14 LAB
GLUCOSE BLDC GLUCOMTR-MCNC: 186 MG/DL (ref 70–99)
GLUCOSE BLDC GLUCOMTR-MCNC: 261 MG/DL (ref 70–99)
POTASSIUM SERPL-SCNC: 4 MMOL/L (ref 3.4–5.3)

## 2018-03-14 PROCEDURE — 40000170 ZZH STATISTIC PRE-PROCEDURE ASSESSMENT II: Performed by: COLON & RECTAL SURGERY

## 2018-03-14 PROCEDURE — 37000009 ZZH ANESTHESIA TECHNICAL FEE, EACH ADDTL 15 MIN: Performed by: COLON & RECTAL SURGERY

## 2018-03-14 PROCEDURE — 25000125 ZZHC RX 250: Performed by: COLON & RECTAL SURGERY

## 2018-03-14 PROCEDURE — 36000053 ZZH SURGERY LEVEL 2 EA 15 ADDTL MIN - UMMC: Performed by: COLON & RECTAL SURGERY

## 2018-03-14 PROCEDURE — 84132 ASSAY OF SERUM POTASSIUM: CPT | Performed by: ANESTHESIOLOGY

## 2018-03-14 PROCEDURE — 36415 COLL VENOUS BLD VENIPUNCTURE: CPT | Performed by: ANESTHESIOLOGY

## 2018-03-14 PROCEDURE — 36000051 ZZH SURGERY LEVEL 2 1ST 30 MIN - UMMC: Performed by: COLON & RECTAL SURGERY

## 2018-03-14 PROCEDURE — 71000027 ZZH RECOVERY PHASE 2 EACH 15 MINS: Performed by: COLON & RECTAL SURGERY

## 2018-03-14 PROCEDURE — 27210794 ZZH OR GENERAL SUPPLY STERILE: Performed by: COLON & RECTAL SURGERY

## 2018-03-14 PROCEDURE — 25000128 H RX IP 250 OP 636: Performed by: NURSE ANESTHETIST, CERTIFIED REGISTERED

## 2018-03-14 PROCEDURE — 25000132 ZZH RX MED GY IP 250 OP 250 PS 637: Performed by: COLON & RECTAL SURGERY

## 2018-03-14 PROCEDURE — 82962 GLUCOSE BLOOD TEST: CPT

## 2018-03-14 PROCEDURE — 37000008 ZZH ANESTHESIA TECHNICAL FEE, 1ST 30 MIN: Performed by: COLON & RECTAL SURGERY

## 2018-03-14 RX ORDER — FENTANYL CITRATE 50 UG/ML
25-50 INJECTION, SOLUTION INTRAMUSCULAR; INTRAVENOUS
Status: DISCONTINUED | OUTPATIENT
Start: 2018-03-14 | End: 2018-03-14 | Stop reason: HOSPADM

## 2018-03-14 RX ORDER — ACETAMINOPHEN 325 MG/1
975 TABLET ORAL ONCE
Status: COMPLETED | OUTPATIENT
Start: 2018-03-14 | End: 2018-03-14

## 2018-03-14 RX ORDER — ONDANSETRON 2 MG/ML
INJECTION INTRAMUSCULAR; INTRAVENOUS PRN
Status: DISCONTINUED | OUTPATIENT
Start: 2018-03-14 | End: 2018-03-14

## 2018-03-14 RX ORDER — BUPIVACAINE HYDROCHLORIDE AND EPINEPHRINE 5; 5 MG/ML; UG/ML
INJECTION, SOLUTION PERINEURAL PRN
Status: DISCONTINUED | OUTPATIENT
Start: 2018-03-14 | End: 2018-03-14 | Stop reason: HOSPADM

## 2018-03-14 RX ORDER — ACETIC ACID 5 %
LIQUID (ML) MISCELLANEOUS PRN
Status: DISCONTINUED | OUTPATIENT
Start: 2018-03-14 | End: 2018-03-14 | Stop reason: HOSPADM

## 2018-03-14 RX ORDER — ONDANSETRON 2 MG/ML
4 INJECTION INTRAMUSCULAR; INTRAVENOUS EVERY 30 MIN PRN
Status: DISCONTINUED | OUTPATIENT
Start: 2018-03-14 | End: 2018-03-14 | Stop reason: HOSPADM

## 2018-03-14 RX ORDER — OXYCODONE AND ACETAMINOPHEN 5; 325 MG/1; MG/1
1-2 TABLET ORAL 4 TIMES DAILY PRN
Qty: 30 TABLET | Refills: 0 | Status: ON HOLD | OUTPATIENT
Start: 2018-03-14 | End: 2018-10-10

## 2018-03-14 RX ORDER — ONDANSETRON 4 MG/1
4 TABLET, ORALLY DISINTEGRATING ORAL
Status: DISCONTINUED | OUTPATIENT
Start: 2018-03-14 | End: 2018-03-14 | Stop reason: HOSPADM

## 2018-03-14 RX ORDER — MEPERIDINE HYDROCHLORIDE 25 MG/ML
12.5 INJECTION INTRAMUSCULAR; INTRAVENOUS; SUBCUTANEOUS
Status: DISCONTINUED | OUTPATIENT
Start: 2018-03-14 | End: 2018-03-14 | Stop reason: HOSPADM

## 2018-03-14 RX ORDER — ONDANSETRON 4 MG/1
4 TABLET, ORALLY DISINTEGRATING ORAL EVERY 30 MIN PRN
Status: DISCONTINUED | OUTPATIENT
Start: 2018-03-14 | End: 2018-03-14 | Stop reason: HOSPADM

## 2018-03-14 RX ORDER — OXYCODONE HYDROCHLORIDE 5 MG/1
5 TABLET ORAL EVERY 4 HOURS PRN
Status: DISCONTINUED | OUTPATIENT
Start: 2018-03-14 | End: 2018-03-14 | Stop reason: HOSPADM

## 2018-03-14 RX ORDER — FENTANYL CITRATE 50 UG/ML
INJECTION, SOLUTION INTRAMUSCULAR; INTRAVENOUS PRN
Status: DISCONTINUED | OUTPATIENT
Start: 2018-03-14 | End: 2018-03-14

## 2018-03-14 RX ORDER — SODIUM CHLORIDE, SODIUM LACTATE, POTASSIUM CHLORIDE, CALCIUM CHLORIDE 600; 310; 30; 20 MG/100ML; MG/100ML; MG/100ML; MG/100ML
INJECTION, SOLUTION INTRAVENOUS CONTINUOUS
Status: DISCONTINUED | OUTPATIENT
Start: 2018-03-14 | End: 2018-03-14 | Stop reason: HOSPADM

## 2018-03-14 RX ORDER — PROPOFOL 10 MG/ML
INJECTION, EMULSION INTRAVENOUS CONTINUOUS PRN
Status: DISCONTINUED | OUTPATIENT
Start: 2018-03-14 | End: 2018-03-14

## 2018-03-14 RX ORDER — NALOXONE HYDROCHLORIDE 0.4 MG/ML
.1-.4 INJECTION, SOLUTION INTRAMUSCULAR; INTRAVENOUS; SUBCUTANEOUS
Status: DISCONTINUED | OUTPATIENT
Start: 2018-03-14 | End: 2018-03-14 | Stop reason: HOSPADM

## 2018-03-14 RX ORDER — HYDROMORPHONE HYDROCHLORIDE 1 MG/ML
.3-.5 INJECTION, SOLUTION INTRAMUSCULAR; INTRAVENOUS; SUBCUTANEOUS EVERY 10 MIN PRN
Status: DISCONTINUED | OUTPATIENT
Start: 2018-03-14 | End: 2018-03-14 | Stop reason: HOSPADM

## 2018-03-14 RX ORDER — SODIUM CHLORIDE, SODIUM LACTATE, POTASSIUM CHLORIDE, CALCIUM CHLORIDE 600; 310; 30; 20 MG/100ML; MG/100ML; MG/100ML; MG/100ML
INJECTION, SOLUTION INTRAVENOUS CONTINUOUS PRN
Status: DISCONTINUED | OUTPATIENT
Start: 2018-03-14 | End: 2018-03-14

## 2018-03-14 RX ADMIN — MIDAZOLAM 2 MG: 1 INJECTION INTRAMUSCULAR; INTRAVENOUS at 07:15

## 2018-03-14 RX ADMIN — ONDANSETRON 4 MG: 2 INJECTION INTRAMUSCULAR; INTRAVENOUS at 07:42

## 2018-03-14 RX ADMIN — PROPOFOL 75 MCG/KG/MIN: 10 INJECTION, EMULSION INTRAVENOUS at 07:30

## 2018-03-14 RX ADMIN — FENTANYL CITRATE 50 MCG: 50 INJECTION, SOLUTION INTRAMUSCULAR; INTRAVENOUS at 07:33

## 2018-03-14 RX ADMIN — ACETAMINOPHEN 975 MG: 325 TABLET, FILM COATED ORAL at 06:25

## 2018-03-14 RX ADMIN — SODIUM CHLORIDE, POTASSIUM CHLORIDE, SODIUM LACTATE AND CALCIUM CHLORIDE: 600; 310; 30; 20 INJECTION, SOLUTION INTRAVENOUS at 07:15

## 2018-03-14 ASSESSMENT — COPD QUESTIONNAIRES: COPD: 1

## 2018-03-14 NOTE — ANESTHESIA POSTPROCEDURE EVALUATION
Patient: Anselmo Rainey    Procedure(s):  Anal Examination Under Anesthesia, Anal Microscopy fulgeration of dysplasia - Wound Class: IV-Dirty or Infected   - Wound Class: IV-Dirty or Infected    Diagnosis:Anal Dysplasia   Diagnosis Additional Information: No value filed.    Anesthesia Type:  MAC    Note:  Anesthesia Post Evaluation    Patient location during evaluation: Phase 2  Patient participation: Able to fully participate in evaluation  Level of consciousness: awake and alert  Pain management: adequate  Airway patency: patent  Cardiovascular status: acceptable  Respiratory status: acceptable  Hydration status: acceptable  PONV: none     Anesthetic complications: None          Last vitals:  Vitals:    03/14/18 0845 03/14/18 0900 03/14/18 0915   BP: 129/71 113/66 127/67   Resp: 18 18 18   Temp:   36.5  C (97.7  F)   SpO2: 100% 100% 100%         Electronically Signed By: Kayla Lozoya MD  March 14, 2018  9:59 AM

## 2018-03-14 NOTE — BRIEF OP NOTE
[facility] [place]    Brief Operative Note    Pre-operative diagnosis: Anal Dysplasia   Post-operative diagnosis * No post-op diagnosis entered *  Procedure: Procedure(s):  Anal Examination Under Anesthesia, Anal Microscopy fulgeration of dysplasia - Wound Class: IV-Dirty or Infected   - Wound Class: IV-Dirty or Infected  Surgeon: Surgeon(s) and Role:     * Sarbjit Mai MD - Primary     * Ra Mccormack MD - Resident - Assisting  Anesthesia: Monitor Anesthesia Care   Estimated blood loss: None  Drains: None  Specimens: * No specimens in log *  Findings:   None.  Complications: None.  Implants: None.

## 2018-03-14 NOTE — OP NOTE
Procedure Date: 03/14/2018      DATE OF PROCEDURE:  03/14/2018      PREOPERATIVE DIAGNOSIS:  Followup anal dysplasia.      POSTOPERATIVE DIAGNOSIS:  Anal dysplasia.      PROCEDURE:  Examination under anesthesia with high-definition anal microscopy and fulguration of anal dysplasia.      HISTORY:  This 67-year-old man has a history of focal high-grade anal dysplasia.  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  He has a chronic anal ulcer.  His last anal microscopy on 08/30/2017 showed a stable ulcer and no evidence of high-grade dysplasia.  Biopsies of the ulcer were benign.  The patient returns for scheduled examination under anesthesia with anal microscopy, as well as biopsies and fulguration if indicated.  All risks and alternatives were outlined in detail to the patient.  I answered all of his questions to his stated satisfaction.  He expressed understanding and provided written informed consent.      SURGEON:  Sarbjit Mai MD      ASSISTANT:  Ra Mccormack MD      DESCRIPTION OF PROCEDURE:  With the patient in the left lateral decubitus position on the split leg table, the buttocks were taped apart and the perianal skin was prepped and draped in a sterile fashion.  A timeout was performed and the patient and procedure confirmed.  The anal canal and perianal skin were soaked with 5% acetic acid.  High definition anal microscopy was performed using the colposcope.  Perianal skin had no abnormal areas.  On microscopy, there was a chronic, quite quiescent-appearing posterior midline anal ulcer with a bed of clean granulation tissue.  There were no acetowhite changes or anything to suggest malignancy.  In the remaining anal canal, there were 2 small patches of relatively intense acetowhitening with subtle microvascular change in the right lateral position.  These were each only 3 or 4 mm in diameter and they were completely  destroyed using electrocautery.  The dentate line was seen in its entirety.  The rectal mucosa proximal to this had a granular appearance and was moderately friable, but there were no gross lesions.  In the absence of additional abnormalities, the procedure was terminated.  Estimated blood loss was nil.  Sponge, needle and instrument counts were reported as correct at the conclusion of case x 2.  There were no immediate operative complications.  Given the stable appearance of the anal canal, I will plan a repeat microscopy under anesthesia in 6 months' time.         BHANU DA SILVA MD             D: 2018   T: 2018   MT: CC      Name:     DEMIAN VALDEZ   MRN:      [id number]        Account:        [id number]   :      1950           Procedure Date: 2018      Document: [id number]       cc: Beatrice Radford MSN, NP-C       SILVANO OLIVA MD       [facility] Surgery Billing

## 2018-03-14 NOTE — ANESTHESIA PREPROCEDURE EVALUATION
Anesthesia Evaluation     . Pt has had prior anesthetic.     History of anesthetic complications   - difficult intubation        ROS/MED HX    ENT/Pulmonary:     (+)sleep apnea, COPD, doesn't use CPAP , . .    Neurologic:       Cardiovascular:     (+) hypertension--CAD, --. : . CHF Last EF: 65% . . :. . Previous cardiac testing date:results:date: results:ECG reviewed date: results: date: results:          METS/Exercise Tolerance:     Hematologic:         Musculoskeletal:         GI/Hepatic:     (+) GERD       Renal/Genitourinary:         Endo:     (+) type II DM Obesity, .      Psychiatric:         Infectious Disease:         Malignancy:   (+) Malignancy           Other:                     Physical Exam  Normal systems: cardiovascular, pulmonary and dental    Airway   Mallampati: IV  TM distance: >3 FB  Neck ROM: full  Comment: Thick neck    Dental     Cardiovascular       Pulmonary                     Anesthesia Plan      History & Physical Review  History and physical reviewed and following examination; no interval change.    ASA Status:  3 .        Plan for MAC with Intravenous and Propofol induction. Maintenance will be TIVA.  Reason for MAC:  Chronic cardiopulmonary disease (G9), Difficulty with conscious sedation (QS) and Deep or markedly invasive procedure (G8)  PONV prophylaxis:  Ondansetron (or other 5HT-3)       Postoperative Care      Consents  Anesthetic plan, risks, benefits and alternatives discussed with:  Patient..            ANESTHESIA PREOP EVALUATION    NPO Status: NPO per Anesthesia Guidelines for Procedure/Surgery Except for: Meds    PMHx/PSHx/ROS:  PAST MEDICAL HISTORY:   Past Medical History:   Diagnosis Date     Congestive heart failure, unspecified      COPD (chronic obstructive pulmonary disease) (H)      Coronary artery disease      Diabetes (H)      Gastro-oesophageal reflux disease      Heart disease      History of rectal bleeding      Hypertension      Ileostomy in place (H)       Ischemic colitis (H)      Obese      PONV (postoperative nausea and vomiting)      Psoriasis      PTSD (post-traumatic stress disorder)      Seizures (H)      Sleep apnea     no cpap     Stented coronary artery 2011    2 STENTS       PAST SURGICAL HISTORY:   Past Surgical History:   Procedure Laterality Date     ANOSCOPY  5/28/2014    Procedure: ANOSCOPY;  Surgeon: Sarbjit Mai MD;  Location: [facility]     BIOPSY ANAL N/A 10/22/2014    Procedure: BIOPSY ANAL;  Surgeon: Sarbjit Mai MD;  Location: [facility]     C LAP,SURG,COLECTOMY,TOTAL,W/PROCTECTOMY       COLONOSCOPY       EXAM UNDER ANESTHESIA ANUS  5/28/2014    Procedure: EXAM UNDER ANESTHESIA ANUS;  Surgeon: Sarbjit Mai MD;  Location: [facility]     EXAM UNDER ANESTHESIA ANUS N/A 10/22/2014    Procedure: EXAM UNDER ANESTHESIA ANUS;  Surgeon: Sarbjit Mai MD;  Location: [facility]     EXAM UNDER ANESTHESIA ANUS N/A 4/26/2017    Procedure: EXAM UNDER ANESTHESIA ANUS;  Anal Exam with Anal Microscopy and Biopsies ;  Surgeon: Sarbjit Mai MD;  Location: [facility]     EXAM UNDER ANESTHESIA ANUS N/A 8/30/2017    Procedure: EXAM UNDER ANESTHESIA ANUS;  Examination Under anesthesia, Anal Microscopy With Biopsies, fulguration ;  Surgeon: Sarbjit Mai MD;  Location: [facility]     EXAM UNDER ANESTHESIA RECTUM N/A 6/8/2015    Procedure: EXAM UNDER ANESTHESIA RECTUM;  Surgeon: Sarbjit Mai MD;  Location: [facility]     HC PERIANAL BIOPSY      with emergency surgery for post excision or post biopsy hemorrhage     ILEOSTOMY       ILEOSTOMY       MICROSCOPY ANAL N/A 10/22/2014    Procedure: MICROSCOPY ANAL;  Surgeon: Sarbjit Mai MD;  Location: [facility]     MICROSCOPY ANAL N/A 6/8/2015    Procedure: MICROSCOPY ANAL;  Surgeon: Sarbjit Mai MD;  [facility]: [facility]     MICROSCOPY ANAL N/A 4/26/2017    Procedure: MICROSCOPY ANAL;;  Surgeon: Sarbjit Mai MD;  [facility]     MICROSCOPY ANAL N/A 8/30/2017    Procedure: MICROSCOPY ANAL;;  Surgeon: Sarbjit Mai MD;  Location: [facility]      SIGMOIDOSCOPY FLEXIBLE  5/28/2014    Procedure: SIGMOIDOSCOPY FLEXIBLE;  Surgeon: Sarbjit Mai MD;  Location: [facility]     STENT         FAMILY HISTORY:   Family History   Problem Relation Age of Onset     Anesthesia Reaction No family hx of      Crohn Disease No family hx of        Allergies:   Allergies   Allergen Reactions     Cephalexin Difficulty breathing     Gemfibrozil Other (See Comments)     unknown     Levofloxacin Difficulty breathing     Throat tightens and he gets a spotty rash     Lisinopril Cough       Meds:   Prescriptions Prior to Admission   Medication Sig Dispense Refill Last Dose     insulin glargine (LANTUS) 100 UNIT/ML injection Inject 46 Units Subcutaneous every morning   3/14/2018 at 0430     CarBAMazepine (TEGRETOL PO) Take 400 mg by mouth 4 times daily   3/13/2018 at 1800     OMEPRAZOLE PO Take 20 mg by mouth 2 tabs bid   3/13/2018 at 2000     TERAZOSIN HCL PO Take 2 mg by mouth daily   3/13/2018 at 0800     TOPIRAMATE PO Take 50 mg by mouth daily   3/13/2018 at 2000     CLONAZEPAM PO Take 0.5 mg by mouth as needed for anxiety   Past Week at Unknown time     FUROSEMIDE PO Take 40 mg by mouth daily   3/13/2018 at 2000     buPROPion (WELLBUTRIN) 75 MG tablet Take 150 mg by mouth 3 times daily    3/13/2018 at 2000     cholecalciferol (VITAMIN D3) 1000 UNIT tablet Take 2,000 Units by mouth 3 times daily (with meals)    3/13/2018 at 0800     clotrimazole (LOTRIMIN) 1 % cream Apply topically 2 times daily   Past Week at Unknown time     desonide (DESOWEN) 0.05 % cream Apply topically 2 times daily   Past Week at Unknown time     ferrous sulfate 325 (65 FE) MG tablet Take by mouth 3 times daily (with meals)    3/13/2018 at 0800     fluocinonide (LIDEX) 0.05 % cream Apply topically 2 times daily   Past Week at Unknown time     glipiZIDE (GLUCOTROL) 10 MG tablet Take 10 mg by mouth 2 times daily (before meals) 2 tabs bid   3/13/2018 at 200     glucose 4 G CHEW Take 3 tablets by mouth daily as  needed   Past Week at Unknown time     isosorbide mononitrate (IMDUR) 30 MG 24 hr tablet Take 30 mg by mouth daily   3/13/2018 at 0800     loperamide (IMODIUM) 2 MG capsule Take 2 mg by mouth 4 times daily as needed   3/13/2018 at 2000     losartan (COZAAR) 50 MG tablet Take 100 mg by mouth daily    3/13/2018 at 0800     metFORMIN (GLUCOPHAGE) 850 MG tablet Take 850 mg by mouth 2 times daily (with meals)   3/10/2018 at Unknown time     miconazole (MICATIN; MICRO GUARD) 2 % powder Apply topically as needed   3/13/2018 at 0800     mometasone (ASMANEX) 220 MCG/INH inhaler Inhale 2 puffs into the lungs 2 times daily   Past Week at Unknown time     orlistat (XENICAL) 120 MG capsule Take 120 mg by mouth 3 times daily (with meals)   3/13/2018 at 2000     simvastatin (ZOCOR) 40 MG tablet Take 20 mg by mouth At Bedtime   3/13/2018 at 2000     triamcinolone (KENALOG) 0.1 % cream Apply topically 2 times daily   Past Week at Unknown time     oxyCODONE (ROXICODONE) 5 MG IR tablet Take 1 tablet (5 mg) by mouth every 4 hours as needed for pain Maximum 5 tablet(s) per day 18 tablet 0 More than a month at Unknown time     docusate sodium (COLACE) 100 MG tablet Take 100 mg by mouth daily 60 tablet 1 More than a month at Unknown time     ASPIRIN PO Take 81 mg by mouth daily   3/11/2018     albuterol (ALBUTEROL) 108 (90 BASE) MCG/ACT inhaler Inhale 2 puffs into the lungs every 4 hours as needed   3/10/2018     nitroglycerin (NITROSTAT) 0.4 MG SL tablet Place under the tongue every 5 minutes as needed   3/10/2018       No current outpatient prescriptions on file.         BMP:  No results found for: NA   Lab Results   Component Value Date    POTASSIUM 4.0 03/14/2018   Kayla Lozoya                .

## 2018-03-14 NOTE — OR NURSING
Discharge instructions given to patient and his wife both verbally and written.  Patient and his wife state verbal understanding of discharge instructions.    Per Dr. Mai, patient does not need to void prior to discharge today.

## 2018-03-14 NOTE — PROGRESS NOTES
SPIRITUAL HEALTH SERVICES  [facility] ([place]   PRE-SURGERY VISIT    Pt had been taken to OR- unable to visit earlier.    Fariba Gonzalez  Volunteer   Pager [phone]

## 2018-03-14 NOTE — DISCHARGE INSTRUCTIONS
Anorectal Surgery Instructions    What can I expect after anorectal surgery?  Most anorectal procedures are done as outpatient surgery, and you go home the same day as the procedure. A few surgical procedures will require that you stay in the hospital for about one to three days. No matter where the procedure is done or how long or short it takes, these recommendations will help you heal and feel more comfortable.    Medicines:  The anal area is very sensitive; you can expect to have some pain for up to 2-4 weeks after the procedure. Your doctor will give you a prescription for one or more pain medications.    Take naprosyn 500 mg twice a day OR ibuprofen 600 mg four times a day     Take this on a regular basis (not as needed) following your surgery.     The drugs are best taken with food.  Do not take if it causes stomach upset or if you have a history of ulcers or gastritis. You can stop the naprosyn (or ibuprofen) or reduce the dose when you are feeling better.    DO NOT use naprosyn, ibuprofen, or other similar agents (eg. Advil or Aleve) if you have inflammatory bowel disease (Ulcerative Colitis or Crohn's disease) or if your doctor as advised you against using these medications    Take acetominaphen (Tylenol) 650-1000 mg four times a day.     Take this on a regular basis (not as needed) following surgery for pain control.              Take the lower dose if you are >65 years old or have liver disease. The maximum dose of acetominaphen is 4000 mg a day. You can stop the acetaminophen or reduce the dose when you are feeling better.    It is important to realize that many narcotic pain relievers (including vicodin, percocet, tylenol #3) also have acetaminophen, and excessive doses of acetaminophen can be dangerous, so do not take these in addition to acetominaphen.  You may take narcotics that don't contain acetominaphen such as oxycodone.      Take oxycodone AS NEEDED in addition to the acetominaphen and  naprosyn.      Because narcotics have side effects (including constipation), you should reduce your use of these medications as tolerated as your pain improves.    *In general, the best strategy is to take (if you are able to tolerate it) the tylenol and naprosen on a regular basis until your pain has largely gone away. You can take the narcotic pain medicine as needed in addition to the tylenol and ibuprofen. As your pain begins to lessen, you should cut back on your narcotic use while continuing to take your regular tylenol and naprosyn doses.      Refilling prescriptions. If you need additional pain medication, please call the triage nurse at [phone] during normal business hours (8 a.m. to 4 p.m., Monday though Friday) or have your pharmacy fax a refill request to [phone]. If you call after hours or on the weekends, the doctor on call may not know you personally and may not renew narcotic pain medication by phone. Call your primary care provider for all other medication refills.    Perineal care:  External gauze dressing can be removed the morning after surgery. If you have an adhesive dressing stuck to the incision, DO NOT remove this.   Tub baths:    If possible, take a tub bath immediately after each bowel movement.     Baths should be take at least 3 times daily for the first week to 10 days following your procedure. You should soak in the tub for 10 to 15 minutes each time with water as warm as you can tolerate.     Even after you go back to work, it is a good idea to sit in the tub in the morning, after returning from work, and again in the evening before bedtime.    Bleeding/Infection:    You can expect to have some bleeding after bowel movements, but it should stop soon after you wipe.     Use a wet cloth or perianal pad (Tucks or Preparation H pads) to gently wipe the area after each bowel movement.    Do not rub the anal area or use a lot of pressure.    Using a spray bottle filled with warm  water helps loosen any remaining stool. Blot gently with a soft dry cloth or tissue paper.    Infection around the anal opening is not very common. The anal area has excellent blood supply, which helps the area to heal. Bloody discharge after bowel movements is normal and may last 2 to 4 weeks after your surgery. However, if you bleed between bowel movements and cannot get it to stop, call the triage nurse immediately [phone].    Bowel function:  Take a fiber supplement such as Metamucil, which is over the counter. It is important to drink six to eight glasses of water or juice everyday when using fiber products.    If you do not have a bowel movement after 1-2 days:    Take Milk of Magnesia-2 tablespoons.       If there are no results, repeat this or add over the counter Miralax.      If you still do not have results, contact the clinic.     If there are no results, repeat this. Stop taking Milk of Magnesia or other laxatives if you begin to have diarrhea.    * Constipation will cause you to strain when you have a bowel movement. The hard stool will be difficult to pass, will increase pain and bleeding, and will slow down healing.  Try to avoid constipation and/or diarrhea as this can make the pain and bleeding worse.    * It is important to have regular bowel movements at least every other day and to keep your stool soft.  A high fiber diet, including at least four servings of fruits or vegetables daily, will help to keep your bowel movements regular and soft.    Activity:  After your procedure, there are no restrictions on your activity     except restrictions surrounding being on narcotics and in pain, such as no heavy machine operating or driving.     You may walk, climb stairs, ride in a car, and sit as tolerated.     It is helpful to avoid sitting in one position for long periods (2 or more hours).    After some surgeries, you may be told not to perform any lifting (more than 10 pounds) for several weeks  after surgery.    When to call:  When do I need to call the doctor or triage nurse?    If you experience any of the problems listed here, call our triage nurse during business hours ([phone]).     The nurse will help you with your problem or have the doctor call you.     After hours and on weekends, please call the main hospital number ([phone]) and ask for the colon and rectal surgery person on call.     Some is available to help you 24 hours a day, seven days a week.    Call for:   ? Fever greater than 101 degrees   ? Chills   ? Foul-smelling drainage   ? Nausea and vomiting   ? Diarrhea - greater than 3 water stools in 24 hours   ? Constipation - no bowel movement after 3 days   ? Severe bleeding that does not stop soon after a bowel movement   ? Problems with the incision, including increased pain, swelling, or redness             Tips for taking pain medications  To get the best pain relief possible , remember these points:      Take pain medications as directed, before pain becomes severe      Pain medication can upset your stomach: taking it with food may help      Constipation is a common side effect of pain medication. Drink plenty of  Fluids      Eat foods high in fiber. Take a stool softener  if recommended by your doctor or  Pharmacist.        Do not drink alcohol, drive or operate machinery while taking pain medications.      Ask about other ways to control pain, such as with heat, ice or relaxation.    [facility], [place]  Same-Day Surgery   Adult Discharge Orders & Instructions     For 24 hours after surgery    1. Get plenty of rest.  A responsible adult must stay with you for at least 24 hours after you leave the hospital.   2. Do not drive or use heavy equipment.  If you have weakness or tingling, don't drive or use heavy equipment until this feeling goes away.  3. Do not drink alcohol.  4. Avoid strenuous or risky activities.  Ask for help when climbing  stairs.   5. You may feel lightheaded.  IF so, sit for a few minutes before standing.  Have someone help you get up.   6. If you have nausea (feel sick to your stomach): Drink only clear liquids such as apple juice, ginger ale, broth or 7-Up.  Rest may also help.  Be sure to drink enough fluids.  Move to a regular diet as you feel able.  7. You may have a slight fever. Call the doctor if your fever is over 100 F (37.7 C) (taken under the tongue) or lasts longer than 24 hours.  8. You may have a dry mouth, a sore throat, muscle aches or trouble sleeping.  These should go away after 24 hours.  9. Do not make important or legal decisions.   Call your doctor for any of the followin.  Signs of infection (fever, growing tenderness at the surgery site, a large amount of drainage or bleeding, severe pain, foul-smelling drainage, redness, swelling).    2. It has been over 8 to 10 hours since surgery and you are still not able to urinate (pass water).    3.  Headache for over 24 hours.    4.  Numbness, tingling or weakness the day after surgery (if you had spinal anesthesia).  To contact a doctor, call Dr Mai's office at [phone]   or:        [phone] and ask for the resident on call for   Colorectal surgery (answered 24 hours a day)      Emergency Department:    [facility]: [phone]       (TTY for hearing impaired: [phone])    [facility]: [phone]       (TTY for hearing impaired: [phone])

## 2018-03-14 NOTE — ANESTHESIA CARE TRANSFER NOTE
Patient: Anselmo Rainey    Procedure(s):  Anal Examination Under Anesthesia, Anal Microscopy fulgeration of dysplasia - Wound Class: IV-Dirty or Infected   - Wound Class: IV-Dirty or Infected    Diagnosis: Anal Dysplasia   Diagnosis Additional Information: No value filed.    Anesthesia Type:   MAC     Note:  Airway :Room Air  Patient transferred to:Phase II  Comments: Anesthesia Care Transfer Note    Patient: Anselmo Rainey    Transferred to: PACU    Patient vital signs: stable    Airway: none    Monitors on, VSS, pt. Stable, Report given to PACU BOOM Veras CRNA  3/14/2018 8:04 AM      Handoff Report: Identifed the Patient, Identified the Reponsible Provider, Reviewed the pertinent medical history, Discussed the surgical course, Reviewed Intra-OP anesthesia mangement and issues during anesthesia, Set expectations for post-procedure period and Allowed opportunity for questions and acknowledgement of understanding      Vitals: (Last set prior to Anesthesia Care Transfer)    CRNA VITALS  3/14/2018 0727 - 3/14/2018 0804      3/14/2018             Pulse: 65    SpO2: 100 %    Resp Rate (set): 10                Electronically Signed By: DESTINEY Ortiz CRNA  March 14, 2018  8:04 AM

## 2018-03-14 NOTE — OR NURSING
" at 0601. Pt states he took 30 units of lantus this AM and ate \"a lot last night so I didn't go too low this morning.\" Insulin gtt not started per protocol as procedure only 45 minutes long. Dr. Lozoya notified. No new orders received. Will continue to monitor.   "

## 2018-03-14 NOTE — IP AVS SNAPSHOT
Same Day Surgery [address] [address]    Phone:  [phone]                                       After Visit Summary   3/14/2018    Anselmo Rainey    MRN: [id number]           After Visit Summary Signature Page     I have received my discharge instructions, and my questions have been answered. I have discussed any challenges I see with this plan with the nurse or doctor.    ..........................................................................................................................................  Patient/Patient Representative Signature      ..........................................................................................................................................  Patient Representative Print Name and Relationship to Patient    ..................................................               ................................................  Date                                            Time    ..........................................................................................................................................  Reviewed by Signature/Title    ...................................................              ..............................................  Date                                                            Time

## 2018-03-14 NOTE — IP AVS SNAPSHOT
MRN:[id number]                      After Visit Summary   3/14/2018    Anselmo Rainey    MRN: [id number]           Thank you!     Thank you for choosing [facility] for your care. Our goal is always to provide you with excellent care. Hearing back from our patients is one way we can continue to improve our services. Please take a few minutes to complete the written survey that you may receive in the mail after you visit with us. Thank you!        Patient Information     Date Of Birth          1950        About your hospital stay     You were admitted on:  March 14, 2018 You last received care in the:  [facility]    You were discharged on:  March 14, 2018       Who to Call     For medical emergencies, please call 911.  For non-urgent questions about your medical care, please call your primary care provider or clinic, [phone]  For questions related to your surgery, please call your surgery clinic        Attending Provider     Provider Sarbjit Vallecillo MD Colon and Rectal Surgery       Primary Care Provider Office Phone # Fax #    Kehinde Diaz [phone]      After Care Instructions     Diet Instructions       Resume pre-procedure diet            Discharge Instructions       Follow up appointment in 6 months in OR for repeat high-resolution anoscopy.            No driving or operating machinery       until the day after procedure, or if taking narcotic pain medications.                  Further instructions from your care team       Anorectal Surgery Instructions    What can I expect after anorectal surgery?  Most anorectal procedures are done as outpatient surgery, and you go home the same day as the procedure. A few surgical procedures will require that you stay in the hospital for about one to three days. No matter where the procedure is done or how long or short it takes, these recommendations will help you heal and feel more  comfortable.    Medicines:  The anal area is very sensitive; you can expect to have some pain for up to 2-4 weeks after the procedure. Your doctor will give you a prescription for one or more pain medications.    Take naprosyn 500 mg twice a day OR ibuprofen 600 mg four times a day     Take this on a regular basis (not as needed) following your surgery.     The drugs are best taken with food.  Do not take if it causes stomach upset or if you have a history of ulcers or gastritis. You can stop the naprosyn (or ibuprofen) or reduce the dose when you are feeling better.    DO NOT use naprosyn, ibuprofen, or other similar agents (eg. Advil or Aleve) if you have inflammatory bowel disease (Ulcerative Colitis or Crohn's disease) or if your doctor as advised you against using these medications    Take acetominaphen (Tylenol) 650-1000 mg four times a day.     Take this on a regular basis (not as needed) following surgery for pain control.              Take the lower dose if you are >65 years old or have liver disease. The maximum dose of acetominaphen is 4000 mg a day. You can stop the acetaminophen or reduce the dose when you are feeling better.    It is important to realize that many narcotic pain relievers (including vicodin, percocet, tylenol #3) also have acetaminophen, and excessive doses of acetaminophen can be dangerous, so do not take these in addition to acetominaphen.  You may take narcotics that don't contain acetominaphen such as oxycodone.      Take oxycodone AS NEEDED in addition to the acetominaphen and naprosyn.      Because narcotics have side effects (including constipation), you should reduce your use of these medications as tolerated as your pain improves.    *In general, the best strategy is to take (if you are able to tolerate it) the tylenol and naprosen on a regular basis until your pain has largely gone away. You can take the narcotic pain medicine as needed in addition to the tylenol and  ibuprofen. As your pain begins to lessen, you should cut back on your narcotic use while continuing to take your regular tylenol and naprosyn doses.      Refilling prescriptions. If you need additional pain medication, please call the triage nurse at [phone] during normal business hours (8 a.m. to 4 p.m., Monday though Friday) or have your pharmacy fax a refill request to [phone]. If you call after hours or on the weekends, the doctor on call may not know you personally and may not renew narcotic pain medication by phone. Call your primary care provider for all other medication refills.    Perineal care:  External gauze dressing can be removed the morning after surgery. If you have an adhesive dressing stuck to the incision, DO NOT remove this.   Tub baths:    If possible, take a tub bath immediately after each bowel movement.     Baths should be take at least 3 times daily for the first week to 10 days following your procedure. You should soak in the tub for 10 to 15 minutes each time with water as warm as you can tolerate.     Even after you go back to work, it is a good idea to sit in the tub in the morning, after returning from work, and again in the evening before bedtime.    Bleeding/Infection:    You can expect to have some bleeding after bowel movements, but it should stop soon after you wipe.     Use a wet cloth or perianal pad (Tucks or Preparation H pads) to gently wipe the area after each bowel movement.    Do not rub the anal area or use a lot of pressure.    Using a spray bottle filled with warm water helps loosen any remaining stool. Blot gently with a soft dry cloth or tissue paper.    Infection around the anal opening is not very common. The anal area has excellent blood supply, which helps the area to heal. Bloody discharge after bowel movements is normal and may last 2 to 4 weeks after your surgery. However, if you bleed between bowel movements and cannot get it to stop, call the triage  nurse immediately [phone].    Bowel function:  Take a fiber supplement such as Metamucil, which is over the counter. It is important to drink six to eight glasses of water or juice everyday when using fiber products.    If you do not have a bowel movement after 1-2 days:    Take Milk of Magnesia-2 tablespoons.       If there are no results, repeat this or add over the counter Miralax.      If you still do not have results, contact the clinic.     If there are no results, repeat this. Stop taking Milk of Magnesia or other laxatives if you begin to have diarrhea.    * Constipation will cause you to strain when you have a bowel movement. The hard stool will be difficult to pass, will increase pain and bleeding, and will slow down healing.  Try to avoid constipation and/or diarrhea as this can make the pain and bleeding worse.    * It is important to have regular bowel movements at least every other day and to keep your stool soft.  A high fiber diet, including at least four servings of fruits or vegetables daily, will help to keep your bowel movements regular and soft.    Activity:  After your procedure, there are no restrictions on your activity     except restrictions surrounding being on narcotics and in pain, such as no heavy machine operating or driving.     You may walk, climb stairs, ride in a car, and sit as tolerated.     It is helpful to avoid sitting in one position for long periods (2 or more hours).    After some surgeries, you may be told not to perform any lifting (more than 10 pounds) for several weeks after surgery.    When to call:  When do I need to call the doctor or triage nurse?    If you experience any of the problems listed here, call our triage nurse during business hours ([phone]).     The nurse will help you with your problem or have the doctor call you.     After hours and on weekends, please call the main hospital number ([phone]) and ask for the colon and rectal surgery  person on call.     Some is available to help you 24 hours a day, seven days a week.    Call for:   ? Fever greater than 101 degrees   ? Chills   ? Foul-smelling drainage   ? Nausea and vomiting   ? Diarrhea - greater than 3 water stools in 24 hours   ? Constipation - no bowel movement after 3 days   ? Severe bleeding that does not stop soon after a bowel movement   ? Problems with the incision, including increased pain, swelling, or redness             Tips for taking pain medications  To get the best pain relief possible , remember these points:      Take pain medications as directed, before pain becomes severe      Pain medication can upset your stomach: taking it with food may help      Constipation is a common side effect of pain medication. Drink plenty of  Fluids      Eat foods high in fiber. Take a stool softener  if recommended by your doctor or  Pharmacist.        Do not drink alcohol, drive or operate machinery while taking pain medications.      Ask about other ways to control pain, such as with heat, ice or relaxation.    [facility] [place]  Same-Day Surgery   Adult Discharge Orders & Instructions     For 24 hours after surgery    1. Get plenty of rest.  A responsible adult must stay with you for at least 24 hours after you leave the hospital.   2. Do not drive or use heavy equipment.  If you have weakness or tingling, don't drive or use heavy equipment until this feeling goes away.  3. Do not drink alcohol.  4. Avoid strenuous or risky activities.  Ask for help when climbing stairs.   5. You may feel lightheaded.  IF so, sit for a few minutes before standing.  Have someone help you get up.   6. If you have nausea (feel sick to your stomach): Drink only clear liquids such as apple juice, ginger ale, broth or 7-Up.  Rest may also help.  Be sure to drink enough fluids.  Move to a regular diet as you feel able.  7. You may have a slight fever. Call the doctor if your fever is  "over 100 F (37.7 C) (taken under the tongue) or lasts longer than 24 hours.  8. You may have a dry mouth, a sore throat, muscle aches or trouble sleeping.  These should go away after 24 hours.  9. Do not make important or legal decisions.   Call your doctor for any of the followin.  Signs of infection (fever, growing tenderness at the surgery site, a large amount of drainage or bleeding, severe pain, foul-smelling drainage, redness, swelling).    2. It has been over 8 to 10 hours since surgery and you are still not able to urinate (pass water).    3.  Headache for over 24 hours.    4.  Numbness, tingling or weakness the day after surgery (if you had spinal anesthesia).  To contact a doctor, call Dr Mai's office at [phone]   or:        [phone] and ask for the resident on call for   Colorectal surgery (answered 24 hours a day)      Emergency Department:    [facility]: [phone]       (TTY for hearing impaired: [phone])    [facility]: [phone]       (TTY for hearing impaired: [phone])          Pending Results     No orders found from 3/12/2018 to 3/15/2018.            Admission Information     Date & Time Provider Department Dept. Phone    3/14/2018 Sarbjit Mai MD Same Day Surgery [facility] [phone]      Your Vitals Were     Blood Pressure Temperature Respirations Height Weight Pulse Oximetry    117/57 96.7  F (35.9  C) (Oral) 18 1.676 m (5' 6\") 128.8 kg (283 lb 15.2 oz) 99%    BMI (Body Mass Index)                   45.83 kg/m2           [employer] Information     [employer] lets you send messages to your doctor, view your test results, renew your prescriptions, schedule appointments and more. To sign up, go to [email or web address] . Click on \"Log in\" on the left side of the screen, which will take you to the Welcome page. Then click on \"Sign up Now\" on the right side of the page.     You will be asked to enter the access code listed below, as well as some " personal information. Please follow the directions to create your username and password.     Your access code is: [email or web address]  Expires: 2018  8:43 AM     Your access code will  in 90 days. If you need help or a new code, please call your [facility] clinic or [phone].        Care EveryWhere ID     This is your Care EveryWhere ID. This could be used by other organizations to access your [facility] medical records  [id number]        Equal Access to Services     [facility] : Hadii ector hunter hadasho Soomaali, waaxda luqadaha, qaybta kaalmada adeegyada, wesley kirby . So [facility] [phone].    ATENCIÓN: Si habla español, tiene a lyman disposición servicios gratuitos de asistencia lingüística. Llame al [phone].    We comply with applicable federal civil rights laws and [place] laws. We do not discriminate on the basis of race, color, national origin, age, disability, sex, sexual orientation, or gender identity.               Review of your medicines      START taking        Dose / Directions    oxyCODONE-acetaminophen 5-325 MG per tablet   Commonly known as:  PERCOCET   Used for:  Anal dysplasia        Dose:  1-2 tablet   Take 1-2 tablets by mouth 4 times daily as needed for pain Maximum 12 tablet(s) per day   Quantity:  30 tablet   Refills:  0         CONTINUE these medicines which have NOT CHANGED        Dose / Directions    ASPIRIN PO        Dose:  81 mg   Take 81 mg by mouth daily   Refills:  0       buPROPion 75 MG tablet   Commonly known as:  WELLBUTRIN        Dose:  150 mg   Take 150 mg by mouth 3 times daily   Refills:  0       cholecalciferol 1000 UNIT tablet   Commonly known as:  vitamin D3        Dose:  2000 Units   Take 2,000 Units by mouth 3 times daily (with meals)   Refills:  0       CLONAZEPAM PO        Dose:  0.5 mg   Take 0.5 mg by mouth as needed for anxiety   Refills:  0       clotrimazole 1 % cream   Commonly known as:  LOTRIMIN        Apply topically 2 times  daily   Refills:  0       desonide 0.05 % cream   Commonly known as:  DESOWEN        Apply topically 2 times daily   Refills:  0       docusate sodium 100 MG tablet   Commonly known as:  COLACE   Used for:  Drug-induced constipation        Dose:  100 mg   Take 100 mg by mouth daily   Quantity:  60 tablet   Refills:  1       ferrous sulfate 325 (65 FE) MG tablet   Commonly known as:  IRON        Take by mouth 3 times daily (with meals)   Refills:  0       fluocinonide 0.05 % cream   Commonly known as:  LIDEX        Apply topically 2 times daily   Refills:  0       FUROSEMIDE PO   Indication:  Edema        Dose:  40 mg   Take 40 mg by mouth daily   Refills:  0       glipiZIDE 10 MG tablet   Commonly known as:  GLUCOTROL        Dose:  10 mg   Take 10 mg by mouth 2 times daily (before meals) 2 tabs bid   Refills:  0       glucose 4 G Chew chewable tablet        Dose:  3 tablet   Take 3 tablets by mouth daily as needed   Refills:  0       insulin glargine 100 UNIT/ML injection   Commonly known as:  LANTUS        Dose:  46 Units   Inject 46 Units Subcutaneous every morning   Refills:  0       isosorbide mononitrate 30 MG 24 hr tablet   Commonly known as:  IMDUR        Dose:  30 mg   Take 30 mg by mouth daily   Refills:  0       loperamide 2 MG capsule   Commonly known as:  IMODIUM        Dose:  2 mg   Take 2 mg by mouth 4 times daily as needed   Refills:  0       losartan 50 MG tablet   Commonly known as:  COZAAR        Dose:  100 mg   Take 100 mg by mouth daily   Refills:  0       metFORMIN 850 MG tablet   Commonly known as:  GLUCOPHAGE        Dose:  850 mg   Take 850 mg by mouth 2 times daily (with meals)   Refills:  0       miconazole 2 % powder   Commonly known as:  MICATIN; MICRO GUARD        Apply topically as needed   Refills:  0       mometasone 220 MCG/INH Inhaler   Commonly known as:  ASMANEX        Dose:  2 puff   Inhale 2 puffs into the lungs 2 times daily   Refills:  0       nitroGLYcerin 0.4 MG sublingual  tablet   Commonly known as:  NITROSTAT        Place under the tongue every 5 minutes as needed   Refills:  0       OMEPRAZOLE PO        Dose:  20 mg   Take 20 mg by mouth 2 tabs bid   Refills:  0       orlistat 120 MG capsule   Commonly known as:  XENICAL        Dose:  120 mg   Take 120 mg by mouth 3 times daily (with meals)   Refills:  0       PROAIR  (90 BASE) MCG/ACT Inhaler   Generic drug:  albuterol        Dose:  2 puff   Inhale 2 puffs into the lungs every 4 hours as needed   Refills:  0       simvastatin 40 MG tablet   Commonly known as:  ZOCOR        Dose:  20 mg   Take 20 mg by mouth At Bedtime   Refills:  0       TEGRETOL PO        Dose:  400 mg   Take 400 mg by mouth 4 times daily   Refills:  0       TERAZOSIN HCL PO        Dose:  2 mg   Take 2 mg by mouth daily   Refills:  0       TOPIRAMATE PO        Dose:  50 mg   Take 50 mg by mouth daily   Refills:  0       triamcinolone 0.1 % cream   Commonly known as:  KENALOG        Apply topically 2 times daily   Refills:  0         STOP taking     oxyCODONE IR 5 MG tablet   Commonly known as:  ROXICODONE                Where to get your medicines      Some of these will need a paper prescription and others can be bought over the counter. Ask your nurse if you have questions.     Bring a paper prescription for each of these medications     oxyCODONE-acetaminophen 5-325 MG per tablet                Protect others around you: Learn how to safely use, store and throw away your medicines at www.disposemymeds.org.        Information about OPIOIDS     PRESCRIPTION OPIOIDS: WHAT YOU NEED TO KNOW    Prescription opioids can be used to help relieve moderate to severe pain and are often prescribed following a surgery or injury, or for certain health conditions. These medications can be an important part of treatment but also come with serious risks. It is important to work with your health care provider to make sure you are getting the safest, most effective  care.    WHAT ARE THE RISKS AND SIDE EFFECTS OF OPIOID USE?  Prescription opioids carry serious risks of addiction and overdose, especially with prolonged use. An opioid overdose, often marked by slowed breathing can cause sudden death. The use of prescription opioids can have a number of side effects as well, even when taken as directed:      Tolerance - meaning you might need to take more of a medication for the same pain relief    Physical dependence - meaning you have symptoms of withdrawal when a medication is stopped    Increased sensitivity to pain    Constipation    Nausea, vomiting, and dry mouth    Sleepiness and dizziness    Confusion    Depression    Low levels of testosterone that can result in lower sex drive, energy, and strength    Itching and sweating    RISKS ARE GREATER WITH:    History of drug misuse, substance use disorder, or overdose    Mental health conditions (such as depression or anxiety)    Sleep apnea    Older age (65 years or older)    Pregnancy    Avoid alcohol while taking prescription opioids.   Also, unless specifically advised by your health care provider, medications to avoid include:    Benzodiazepines (such as Xanax or Valium)    Muscle relaxants (such as Soma or Flexeril)    Hypnotics (such as Ambien or Lunesta)    Other prescription opioids    KNOW YOUR OPTIONS:  Talk to your health care provider about ways to manage your pain that do not involve prescription opioids. Some of these options may actually work better and have fewer risks and side effects:    Pain relievers such as acetaminophen, ibuprofen, and naproxen    Some medications that are also used for depression or seizures    Physical therapy and exercise    Cognitive behavioral therapy, a psychological, goal-directed approach, in which patients learn how to modify physical, behavioral, and emotional triggers of pain and stress    IF YOU ARE PRESCRIBED OPIOIDS FOR PAIN:    Never take opioids in greater amounts or more  often than prescribed    Follow up with your primary health care provider and work together to create a plan on how to manage your pain.    Talk about ways to help manage your pain that do not involve prescription opioids    Talk about all concerns and side effects    Help prevent misuse and abuse    Never sell or share prescription opioids    Never use another person's prescription opioids    Store prescription opioids in a secure place and out of reach of others (this may include visitors, children, friends, and family)    Visit www.cdc.gov/drugoverdose to learn about risks of opioid abuse and overdose    If you believe you may be struggling with addiction, tell your health care provider and ask for guidance or call [facility]'s National Helpline at 0-165-458-HELP    LEARN MORE / www.cdc.gov/drugoverdose/prescribing/guideline.html    Safely dispose of unused prescription opioids: Find your local drug take-back programs and more information about the importance of safe disposal at www.doseofreality.mn.gov             Medication List: This is a list of all your medications and when to take them. Check marks below indicate your daily home schedule. Keep this list as a reference.      Medications           Morning Afternoon Evening Bedtime As Needed    ASPIRIN PO   Take 81 mg by mouth daily                                buPROPion 75 MG tablet   Commonly known as:  WELLBUTRIN   Take 150 mg by mouth 3 times daily                                cholecalciferol 1000 UNIT tablet   Commonly known as:  vitamin D3   Take 2,000 Units by mouth 3 times daily (with meals)                                CLONAZEPAM PO   Take 0.5 mg by mouth as needed for anxiety                                clotrimazole 1 % cream   Commonly known as:  LOTRIMIN   Apply topically 2 times daily                                desonide 0.05 % cream   Commonly known as:  DESOWEN   Apply topically 2 times daily                                docusate  sodium 100 MG tablet   Commonly known as:  COLACE   Take 100 mg by mouth daily                                ferrous sulfate 325 (65 FE) MG tablet   Commonly known as:  IRON   Take by mouth 3 times daily (with meals)                                fluocinonide 0.05 % cream   Commonly known as:  LIDEX   Apply topically 2 times daily                                FUROSEMIDE PO   Take 40 mg by mouth daily                                glipiZIDE 10 MG tablet   Commonly known as:  GLUCOTROL   Take 10 mg by mouth 2 times daily (before meals) 2 tabs bid                                glucose 4 G Chew chewable tablet   Take 3 tablets by mouth daily as needed                                insulin glargine 100 UNIT/ML injection   Commonly known as:  LANTUS   Inject 46 Units Subcutaneous every morning                                isosorbide mononitrate 30 MG 24 hr tablet   Commonly known as:  IMDUR   Take 30 mg by mouth daily                                loperamide 2 MG capsule   Commonly known as:  IMODIUM   Take 2 mg by mouth 4 times daily as needed                                losartan 50 MG tablet   Commonly known as:  COZAAR   Take 100 mg by mouth daily                                metFORMIN 850 MG tablet   Commonly known as:  GLUCOPHAGE   Take 850 mg by mouth 2 times daily (with meals)                                miconazole 2 % powder   Commonly known as:  MICATIN; MICRO GUARD   Apply topically as needed                                mometasone 220 MCG/INH Inhaler   Commonly known as:  ASMANEX   Inhale 2 puffs into the lungs 2 times daily                                nitroGLYcerin 0.4 MG sublingual tablet   Commonly known as:  NITROSTAT   Place under the tongue every 5 minutes as needed                                OMEPRAZOLE PO   Take 20 mg by mouth 2 tabs bid                                orlistat 120 MG capsule   Commonly known as:  XENICAL   Take 120 mg by mouth 3 times daily (with meals)                                 oxyCODONE-acetaminophen 5-325 MG per tablet   Commonly known as:  PERCOCET   Take 1-2 tablets by mouth 4 times daily as needed for pain Maximum 12 tablet(s) per day                                PROAIR  (90 BASE) MCG/ACT Inhaler   Inhale 2 puffs into the lungs every 4 hours as needed   Generic drug:  albuterol                                simvastatin 40 MG tablet   Commonly known as:  ZOCOR   Take 20 mg by mouth At Bedtime                                TEGRETOL PO   Take 400 mg by mouth 4 times daily                                TERAZOSIN HCL PO   Take 2 mg by mouth daily                                TOPIRAMATE PO   Take 50 mg by mouth daily                                triamcinolone 0.1 % cream   Commonly known as:  KENALOG   Apply topically 2 times daily

## 2018-03-15 ENCOUNTER — CARE COORDINATION (OUTPATIENT)
Dept: SURGERY | Facility: CLINIC | Age: 68
End: 2018-03-15

## 2018-04-19 ENCOUNTER — TELEPHONE (OUTPATIENT)
Dept: SURGERY | Facility: CLINIC | Age: 68
End: 2018-04-19

## 2018-04-19 NOTE — TELEPHONE ENCOUNTER
"Patient called stating he had a procedure done with Dr. Mai \"about a month ago,\" and has not received his biopsy results.  Informed patient I would send a message to the RN for follow up.  Patient confirmed. Message sent to JOSE Sims.   "

## 2018-04-30 ENCOUNTER — TELEPHONE (OUTPATIENT)
Dept: SURGERY | Facility: CLINIC | Age: 68
End: 2018-04-30

## 2018-04-30 NOTE — TELEPHONE ENCOUNTER
Per patient request the [facility] with [facility]. It was faxed to [phone].

## 2018-08-07 ENCOUNTER — TELEPHONE (OUTPATIENT)
Dept: SURGERY | Facility: CLINIC | Age: 68
End: 2018-08-07

## 2018-08-07 NOTE — TELEPHONE ENCOUNTER
Patient left a message to see if we could get the authorization started for his procedure that is due in October.  Called and spoke with patient.  Informed patient that I will start working on authorization with Sherry Rincon.  Tentatively scheduled patient for OR 10/10/18.  Informed patient I would follow up once we hear back.  Patient verbalized understanding.  Spoke with Sherry Rincon who will contact the [facility] to start [facility] process.

## 2018-08-28 ENCOUNTER — TELEPHONE (OUTPATIENT)
Dept: SURGERY | Facility: CLINIC | Age: 68
End: 2018-08-28

## 2018-08-28 DIAGNOSIS — K62.82 ANAL DYSPLASIA: Primary | ICD-10-CM

## 2018-08-28 NOTE — TELEPHONE ENCOUNTER
Per Sherry Rincon, she has received the [facility] the [facility] for patient's upcoming procedure 10/10/18.  Called and informed patient.  Patient confirms arrival time and location.  Patient [facility] of [facility].  Informed patient I will mail a surgery patient.  Patient confirmed.

## 2018-10-09 ENCOUNTER — ANESTHESIA EVENT (OUTPATIENT)
Dept: SURGERY | Facility: CLINIC | Age: 68
End: 2018-10-09
Payer: COMMERCIAL

## 2018-10-10 ENCOUNTER — HOSPITAL ENCOUNTER (OUTPATIENT)
Facility: CLINIC | Age: 68
Discharge: HOME | End: 2018-10-10
Attending: COLON & RECTAL SURGERY | Admitting: COLON & RECTAL SURGERY
Payer: COMMERCIAL

## 2018-10-10 ENCOUNTER — ANESTHESIA (OUTPATIENT)
Dept: SURGERY | Facility: CLINIC | Age: 68
End: 2018-10-10
Payer: COMMERCIAL

## 2018-10-10 VITALS
BODY MASS INDEX: 42.11 KG/M2 | DIASTOLIC BLOOD PRESSURE: 47 MMHG | TEMPERATURE: 98 F | HEIGHT: 67 IN | SYSTOLIC BLOOD PRESSURE: 103 MMHG | WEIGHT: 268.3 LBS | OXYGEN SATURATION: 96 % | RESPIRATION RATE: 16 BRPM

## 2018-10-10 DIAGNOSIS — K62.89 PROCTITIS: Primary | ICD-10-CM

## 2018-10-10 LAB — GLUCOSE BLDC GLUCOMTR-MCNC: 201 MG/DL (ref 70–99)

## 2018-10-10 PROCEDURE — 25000125 ZZHC RX 250: Performed by: NURSE ANESTHETIST, CERTIFIED REGISTERED

## 2018-10-10 PROCEDURE — 25000128 H RX IP 250 OP 636: Performed by: NURSE ANESTHETIST, CERTIFIED REGISTERED

## 2018-10-10 PROCEDURE — 93010 ELECTROCARDIOGRAM REPORT: CPT | Performed by: INTERNAL MEDICINE

## 2018-10-10 PROCEDURE — 25000132 ZZH RX MED GY IP 250 OP 250 PS 637: Performed by: COLON & RECTAL SURGERY

## 2018-10-10 PROCEDURE — 82962 GLUCOSE BLOOD TEST: CPT

## 2018-10-10 PROCEDURE — 37000008 ZZH ANESTHESIA TECHNICAL FEE, 1ST 30 MIN: Performed by: COLON & RECTAL SURGERY

## 2018-10-10 PROCEDURE — 37000009 ZZH ANESTHESIA TECHNICAL FEE, EACH ADDTL 15 MIN: Performed by: COLON & RECTAL SURGERY

## 2018-10-10 PROCEDURE — 93005 ELECTROCARDIOGRAM TRACING: CPT

## 2018-10-10 PROCEDURE — 40000170 ZZH STATISTIC PRE-PROCEDURE ASSESSMENT II: Performed by: COLON & RECTAL SURGERY

## 2018-10-10 PROCEDURE — 36000051 ZZH SURGERY LEVEL 2 1ST 30 MIN - UMMC: Performed by: COLON & RECTAL SURGERY

## 2018-10-10 PROCEDURE — 25000125 ZZHC RX 250: Performed by: COLON & RECTAL SURGERY

## 2018-10-10 PROCEDURE — 27210794 ZZH OR GENERAL SUPPLY STERILE: Performed by: COLON & RECTAL SURGERY

## 2018-10-10 PROCEDURE — 36000053 ZZH SURGERY LEVEL 2 EA 15 ADDTL MIN - UMMC: Performed by: COLON & RECTAL SURGERY

## 2018-10-10 PROCEDURE — 71000027 ZZH RECOVERY PHASE 2 EACH 15 MINS: Performed by: COLON & RECTAL SURGERY

## 2018-10-10 PROCEDURE — 40000065 ZZH STATISTIC EKG NON-CHARGEABLE

## 2018-10-10 RX ORDER — ONDANSETRON 4 MG/1
4 TABLET, ORALLY DISINTEGRATING ORAL EVERY 30 MIN PRN
Status: CANCELLED | OUTPATIENT
Start: 2018-10-10

## 2018-10-10 RX ORDER — ACETAMINOPHEN 325 MG/1
650 TABLET ORAL
Status: CANCELLED | OUTPATIENT
Start: 2018-10-10

## 2018-10-10 RX ORDER — ONDANSETRON 2 MG/ML
INJECTION INTRAMUSCULAR; INTRAVENOUS PRN
Status: DISCONTINUED | OUTPATIENT
Start: 2018-10-10 | End: 2018-10-10

## 2018-10-10 RX ORDER — FENTANYL CITRATE 50 UG/ML
INJECTION, SOLUTION INTRAMUSCULAR; INTRAVENOUS PRN
Status: DISCONTINUED | OUTPATIENT
Start: 2018-10-10 | End: 2018-10-10

## 2018-10-10 RX ORDER — ONDANSETRON 4 MG/1
4 TABLET, ORALLY DISINTEGRATING ORAL
Status: CANCELLED | OUTPATIENT
Start: 2018-10-10

## 2018-10-10 RX ORDER — ACETIC ACID 5 %
LIQUID (ML) MISCELLANEOUS PRN
Status: DISCONTINUED | OUTPATIENT
Start: 2018-10-10 | End: 2018-10-10 | Stop reason: HOSPADM

## 2018-10-10 RX ORDER — PROPOFOL 10 MG/ML
INJECTION, EMULSION INTRAVENOUS CONTINUOUS PRN
Status: DISCONTINUED | OUTPATIENT
Start: 2018-10-10 | End: 2018-10-10

## 2018-10-10 RX ORDER — FENTANYL CITRATE 50 UG/ML
25-50 INJECTION, SOLUTION INTRAMUSCULAR; INTRAVENOUS
Status: CANCELLED | OUTPATIENT
Start: 2018-10-10

## 2018-10-10 RX ORDER — NALOXONE HYDROCHLORIDE 0.4 MG/ML
.1-.4 INJECTION, SOLUTION INTRAMUSCULAR; INTRAVENOUS; SUBCUTANEOUS
Status: CANCELLED | OUTPATIENT
Start: 2018-10-10 | End: 2018-10-11

## 2018-10-10 RX ORDER — LIDOCAINE HYDROCHLORIDE 20 MG/ML
INJECTION, SOLUTION INFILTRATION; PERINEURAL PRN
Status: DISCONTINUED | OUTPATIENT
Start: 2018-10-10 | End: 2018-10-10

## 2018-10-10 RX ORDER — OXYCODONE HYDROCHLORIDE 5 MG/1
5 TABLET ORAL EVERY 6 HOURS PRN
Qty: 15 TABLET | Refills: 0 | Status: ON HOLD | OUTPATIENT
Start: 2018-10-10 | End: 2019-06-21

## 2018-10-10 RX ORDER — BUPIVACAINE HYDROCHLORIDE AND EPINEPHRINE 5; 5 MG/ML; UG/ML
INJECTION, SOLUTION PERINEURAL PRN
Status: DISCONTINUED | OUTPATIENT
Start: 2018-10-10 | End: 2018-10-10 | Stop reason: HOSPADM

## 2018-10-10 RX ORDER — SODIUM CHLORIDE, SODIUM LACTATE, POTASSIUM CHLORIDE, CALCIUM CHLORIDE 600; 310; 30; 20 MG/100ML; MG/100ML; MG/100ML; MG/100ML
INJECTION, SOLUTION INTRAVENOUS CONTINUOUS
Status: CANCELLED | OUTPATIENT
Start: 2018-10-10

## 2018-10-10 RX ORDER — SODIUM CHLORIDE, SODIUM LACTATE, POTASSIUM CHLORIDE, CALCIUM CHLORIDE 600; 310; 30; 20 MG/100ML; MG/100ML; MG/100ML; MG/100ML
INJECTION, SOLUTION INTRAVENOUS CONTINUOUS PRN
Status: DISCONTINUED | OUTPATIENT
Start: 2018-10-10 | End: 2018-10-10

## 2018-10-10 RX ORDER — ONDANSETRON 2 MG/ML
4 INJECTION INTRAMUSCULAR; INTRAVENOUS EVERY 30 MIN PRN
Status: CANCELLED | OUTPATIENT
Start: 2018-10-10

## 2018-10-10 RX ORDER — ACETAMINOPHEN 325 MG/1
975 TABLET ORAL ONCE
Status: COMPLETED | OUTPATIENT
Start: 2018-10-10 | End: 2018-10-10

## 2018-10-10 RX ADMIN — ONDANSETRON 4 MG: 2 INJECTION INTRAMUSCULAR; INTRAVENOUS at 07:54

## 2018-10-10 RX ADMIN — PROPOFOL 75 MCG/KG/MIN: 10 INJECTION, EMULSION INTRAVENOUS at 07:45

## 2018-10-10 RX ADMIN — ACETAMINOPHEN 975 MG: 325 TABLET, FILM COATED ORAL at 06:58

## 2018-10-10 RX ADMIN — LIDOCAINE HYDROCHLORIDE 80 MG: 20 INJECTION, SOLUTION INFILTRATION; PERINEURAL at 07:46

## 2018-10-10 RX ADMIN — FENTANYL CITRATE 50 MCG: 50 INJECTION, SOLUTION INTRAMUSCULAR; INTRAVENOUS at 07:43

## 2018-10-10 RX ADMIN — MIDAZOLAM 2 MG: 1 INJECTION INTRAMUSCULAR; INTRAVENOUS at 07:30

## 2018-10-10 RX ADMIN — SODIUM CHLORIDE, POTASSIUM CHLORIDE, SODIUM LACTATE AND CALCIUM CHLORIDE: 600; 310; 30; 20 INJECTION, SOLUTION INTRAVENOUS at 07:20

## 2018-10-10 ASSESSMENT — PAIN DESCRIPTION - DESCRIPTORS
DESCRIPTORS: SORE
DESCRIPTORS: SORE

## 2018-10-10 ASSESSMENT — LIFESTYLE VARIABLES: TOBACCO_USE: 1

## 2018-10-10 ASSESSMENT — COPD QUESTIONNAIRES: COPD: 1

## 2018-10-10 NOTE — OR NURSING
Dr. Arias at bedside in phase 2, said himself and cardiology went over pt's records. Pt still needs to follow up with cardiologist but can discharge today.

## 2018-10-10 NOTE — ANESTHESIA POSTPROCEDURE EVALUATION
Patient: Anselmo CARCAMO Pitleck    Procedure(s):  Anal Exam, Anal Microscopy, Fulgeration of Dysplagia - Wound Class: IV-Dirty or Infected    Diagnosis:Anal Dysplasia   Diagnosis Additional Information: No value filed.    Anesthesia Type:  No value filed.    Note:  Anesthesia Post Evaluation    Patient location during evaluation: Phase 2  Patient participation: Able to fully participate in evaluation  Level of consciousness: awake and alert  Pain management: adequate  Airway patency: patent  Cardiovascular status: hemodynamically stable (ECG post op showing 2nd degree block - Mobitz I, patient instructed to follow up with cardiologist this week regarding change from 09/28 ECG)  Respiratory status: acceptable  Hydration status: acceptable  PONV: none             Last vitals:  Vitals:    10/10/18 0900 10/10/18 0915 10/10/18 0930   BP: 107/45 100/66 102/46   Resp: 16 16 16   Temp:      SpO2: 100%  99%         Electronically Signed By: Parvez Arias MD  October 10, 2018  9:58 AM

## 2018-10-10 NOTE — SIGNIFICANT EVENT
Post-procedure patient found to be in a different rhythm on bedside monitor, so formal 12 lead ECG was ordered.    Was read as 2nd degree AV (Mobitz I) w 2:1 AV conduction which differs from his previous one 09/28/18.  Patient informed of the changes and instructed to follow up this week with his cardiologist, whom he was due to see anyway.  Offered to discuss results with cardiology in house, and patient agreeable if any further recommendations are warranted.    Parvez Arias MD  Staff Anesthesiologist  [phone]

## 2018-10-10 NOTE — BRIEF OP NOTE
[facility], [place]    Brief Operative Note    Pre-operative diagnosis: Anal Dysplasia   Post-operative diagnosis As above  Procedure: Procedure(s):  Anal Exam, Anal Microscopy, Fulgeration of Dysplagia - Wound Class: IV-Dirty or Infected  Surgeon: Surgeon(s) and Role:     * Sarbjit Mai MD - Primary     * Guillermo Elliott MD - Resident - Assisting  Anesthesia: Monitor Anesthesia Care   Estimated blood loss: Minimal  Drains: None  Specimens: * No specimens in log *  Findings:   Proctitis, scattered punctation, healed ulcer  Complications: None.  Implants: None.

## 2018-10-10 NOTE — OR NURSING
Pt arrived to phase 2 with what looks to be an irregular cardiac rhythm and a HR in the 40s-50s. Called Dr. Arias with anesthesia to come assess pt, also ordered STAT EKG.

## 2018-10-10 NOTE — ANESTHESIA CARE TRANSFER NOTE
Patient: Anselmo Saenzleclaura    Procedure(s):  Anal Exam, Anal Microscopy, Fulgeration of Dysplagia - Wound Class: IV-Dirty or Infected    Diagnosis: Anal Dysplasia   Diagnosis Additional Information: No value filed.    Anesthesia Type:   No value filed.     Note:  Airway :Room Air  Patient transferred to:Phase II  Comments: Pt alert, breathing spontaneously on RA. VSS. Report shared with RN.Handoff Report: Identifed the Patient, Identified the Reponsible Provider, Reviewed the pertinent medical history, Discussed the surgical course, Reviewed Intra-OP anesthesia mangement and issues during anesthesia, Set expectations for post-procedure period and Allowed opportunity for questions and acknowledgement of understanding      Vitals: (Last set prior to Anesthesia Care Transfer)    CRNA VITALS  10/10/2018 0747 - 10/10/2018 0820      10/10/2018             Resp Rate (observed): (!)  1                Electronically Signed By: DESTINEY Garcia CRNA  October 10, 2018  8:20 AM

## 2018-10-10 NOTE — OR NURSING
Dr. Arias at bedside in phase 2, asked nurse to page cards consult to his phone to come see pt in phase 2.

## 2018-10-10 NOTE — DISCHARGE INSTRUCTIONS
Follow up with cardiologist AIRAMP per Dr. Arias with anesthesia's request regarding 2nd degree type 1 heart block result on post op EKG. Cardiologist to request copy of EKG.      Anorectal Surgery Instructions    What can I expect after anorectal surgery?  Most anorectal procedures are done as outpatient surgery, and you go home the same day as the procedure. A few surgical procedures will require that you stay in the hospital for about one to three days. No matter where the procedure is done or how long or short it takes, these recommendations will help you heal and feel more comfortable.    Medicines:  The anal area is very sensitive; you can expect to have some pain for up to 2-4 weeks after the procedure. Your doctor will give you a prescription for one or more pain medications.    Take naprosyn 500 mg twice a day OR ibuprofen 600 mg four times a day     Take this on a regular basis (not as needed) following your surgery.     The drugs are best taken with food.  Do not take if it causes stomach upset or if you have a history of ulcers or gastritis. You can stop the naprosyn (or ibuprofen) or reduce the dose when you are feeling better.    DO NOT use naprosyn, ibuprofen, or other similar agents (eg. Advil or Aleve) if you have inflammatory bowel disease (Ulcerative Colitis or Crohn's disease) or if your doctor as advised you against using these medications    Take acetominaphen (Tylenol) 650-1000 mg four times a day.     Take this on a regular basis (not as needed) following surgery for pain control.     Take the lower dose if you are >65 years old or have liver disease. The maximum dose of acetominaphen is 4000 mg a day. You can stop the acetaminophen or reduce the dose when you are feeling better.    It is important to realize that many narcotic pain relievers (including vicodin, percocet, tylenol #3) also have acetaminophen, and excessive doses of acetaminophen can be dangerous, so do not take these in  addition to acetominaphen.  You may take narcotics that don't contain acetominaphen such as oxycodone.      Take oxycodone AS NEEDED in addition to the acetominaphen and naprosyn.      Because narcotics have side effects (including constipation), you should reduce your use of these medications as tolerated as your pain improves.    *In general, the best strategy is to take (if you are able to tolerate it) the tylenol and naprosen on a regular basis until your pain has largely gone away. You can take the narcotic pain medicine as needed in addition to the tylenol and ibuprofen. As your pain begins to lessen, you should cut back on your narcotic use while continuing to take your regular tylenol and naprosyn doses.      Refilling prescriptions. If you need additional pain medication, please call the triage nurse at [phone] during normal business hours (8 a.m. to 4 p.m., Monday though Friday) or have your pharmacy fax a refill request to [phone]. If you call after hours or on the weekends, the doctor on call may not know you personally and may not renew narcotic pain medication by phone. Call your primary care provider for all other medication refills.    Perineal care:  External gauze dressing can be removed the morning after surgery. If you have an adhesive dressing stuck to the incision, DO NOT remove this.   Tub baths:    If possible, take a tub bath immediately after each bowel movement.     Baths should be take at least 3 times daily for the first week to 10 days following your procedure. You should soak in the tub for 10 to 15 minutes each time with water as warm as you can tolerate.     Even after you go back to work, it is a good idea to sit in the tub in the morning, after returning from work, and again in the evening before bedtime.    Bleeding/Infection:    You can expect to have some bleeding after bowel movements, but it should stop soon after you wipe.     Use a wet cloth or perianal pad (Tucks  or Preparation H pads) to gently wipe the area after each bowel movement.    Do not rub the anal area or use a lot of pressure.    Using a spray bottle filled with warm water helps loosen any remaining stool. Blot gently with a soft dry cloth or tissue paper.    Infection around the anal opening is not very common. The anal area has excellent blood supply, which helps the area to heal. Bloody discharge after bowel movements is normal and may last 2 to 4 weeks after your surgery. However, if you bleed between bowel movements and cannot get it to stop, call the triage nurse immediately [phone].    Bowel function:  Take a fiber supplement such as Metamucil, which is over the counter. It is important to drink six to eight glasses of water or juice everyday when using fiber products.    If you do not have a bowel movement after 1-2 days:    Take Milk of Magnesia-2 tablespoons.       If there are no results, repeat this or add over the counter Miralax.      If you still do not have results, contact the clinic.     If there are no results, repeat this. Stop taking Milk of Magnesia or other laxatives if you begin to have diarrhea.    * Constipation will cause you to strain when you have a bowel movement. The hard stool will be difficult to pass, will increase pain and bleeding, and will slow down healing.  Try to avoid constipation and/or diarrhea as this can make the pain and bleeding worse.    * It is important to have regular bowel movements at least every other day and to keep your stool soft.  A high fiber diet, including at least four servings of fruits or vegetables daily, will help to keep your bowel movements regular and soft.    Activity:  After your procedure, there are no restrictions on your activity     except restrictions surrounding being on narcotics and in pain, such as no heavy machine operating or driving.     You may walk, climb stairs, ride in a car, and sit as tolerated.     It is helpful to  avoid sitting in one position for long periods (2 or more hours).    After some surgeries, you may be told not to perform any lifting (more than 10 pounds) for several weeks after surgery.    When to call:  When do I need to call the doctor or triage nurse?    If you experience any of the problems listed here, call our triage nurse during business hours ([phone]).     The nurse will help you with your problem or have the doctor call you.     After hours and on weekends, please call the main hospital number ([phone]) and ask for the colon and rectal surgery person on call.     Some is available to help you 24 hours a day, seven days a week.    Call for:   ? Fever greater than 101 degrees   ? Chills   ? Foul-smelling drainage   ? Nausea and vomiting   ? Diarrhea - greater than 3 water stools in 24 hours   ? Constipation - no bowel movement after 3 days   ? Severe bleeding that does not stop soon after a bowel movement   ? Problems with the incision, including increased pain, swelling, or redness      [facility], [place]  Same-Day Surgery   Adult Discharge Orders & Instructions     For 24 hours after surgery    1. Get plenty of rest.  A responsible adult must stay with you for at least 24 hours after you leave the hospital.   2. Do not drive or use heavy equipment.  If you have weakness or tingling, don't drive or use heavy equipment until this feeling goes away.  3. Do not drink alcohol.  4. Avoid strenuous or risky activities.  Ask for help when climbing stairs.   5. You may feel lightheaded.  IF so, sit for a few minutes before standing.  Have someone help you get up.   6. If you have nausea (feel sick to your stomach): Drink only clear liquids such as apple juice, ginger ale, broth or 7-Up.  Rest may also help.  Be sure to drink enough fluids.  Move to a regular diet as you feel able.  7. You may have a slight fever. Call the doctor if your fever is over 100 F (37.7 C) (taken  under the tongue) or lasts longer than 24 hours.  8. You may have a dry mouth, a sore throat, muscle aches or trouble sleeping.  These should go away after 24 hours.  9. Do not make important or legal decisions.   Call your doctor for any of the followin.  Signs of infection (fever, growing tenderness at the surgery site, a large amount of drainage or bleeding, severe pain, foul-smelling drainage, redness, swelling).    2. It has been over 8 to 10 hours since surgery and you are still not able to urinate (pass water).    3.  Headache for over 24 hours.    4.  Numbness, tingling or weakness the day after surgery (if you had spinal anesthesia).  To contact a doctor, call Dr Mai's clinic at [phone] or:        [phone] and ask for the resident on call for [facility] (answered 24 hours a day)      Emergency Department:    [facility]: [phone]       (TTY for hearing impaired: [phone])    [facility]: [phone]       (TTY for hearing impaired: [phone])

## 2018-10-10 NOTE — OP NOTE
Procedure Date: 10/10/2018      PREOPERATIVE DIAGNOSES:     1.  History of anal dysplasia.   2.  Proctitis.      POSTOPERATIVE DIAGNOSIS:   1.  Anal dysplasia.   2.  Proctitis.      PROCEDURE:  Examination under anesthesia with high-definition anal microscopy and fulguration of dysplasia.      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  He has a history of a chronic anal ulcer.  The ulcer has been biopsied multiple times and has been stable.  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  He returns for a followup examination.  He complains of an increase in his chronic anal mucus discharge.  He is well familiar with anal microscopy.  Discussed the risks and alternatives.  I answered all of his questions to his stated satisfaction.  He expressed an understanding and provided written informed consent.      SURGEON:  Sarbjit Mai MD      ASSISTANT:  Guillermo Lofton MD      DESCRIPTION OF PROCEDURE:  With the patient in the left lateral decubitus position on the split leg table, and the buttocks taped apart, the perianal skin was prepped and draped in a sterile fashion.  A timeout was performed and the patient and procedure confirmed.  A local block was placed with 0.5% Marcaine.  The anal canal and perianal skin were soaked with 5% acetic acid and a high-definition and microscopy was performed using the colposcope.  The distal rectal mucosa was moderately inflamed and friable, unchanged from previous examinations.  Small focal bleeding points were cauterized.  Perianal examination showed no dysplastic appearing lesions, though there was mild to moderate diffuse acetowhitening.  Within the anal canal, the patient's prior anal ulcer now appears to have fully epithelialized. There were several small foci of micropunctation.   These were destroyed with electrocautery.  No other abnormalities were seen, so the procedure was completed.  Estimated blood loss was nil.  Sponge, needle, and instrument counts reported as correct at the conclusion of the case x 2.  There were no immediate operative complications.  Given the patient's previous AIN-2 and findings consistent with further AIN-2 today, I advised a followup microscopy in roughly 6 months in the operating room.        BHANU DA SILVA MD             D: 10/10/2018   T: 10/10/2018   MT: NTS      Name:     DEMIAN VALDEZ   MRN:      [id number]        Account:        [id number]   :      1950           Procedure Date: 10/10/2018      Document: [id number]       cc: Kehinde Radford MSN, NP-C       PARAMJIT DEVLIN MD       [facility] Surgery Billing

## 2018-10-10 NOTE — ADDENDUM NOTE
Addendum  created 10/10/18 1258 by Bibiana Hollingsworth APRN CRNA    Anesthesia Intra Flowsheets edited

## 2018-10-10 NOTE — ANESTHESIA PREPROCEDURE EVALUATION
Anesthesia Evaluation     .             ROS/MED HX    ENT/Pulmonary:     (+)sleep apnea, tobacco use, Past use COPD, doesn't use CPAP , . .    Neurologic:  - neg neurologic ROS     Cardiovascular:     (+) hypertension--CAD, --stent,. : . . . :. . Previous cardiac testing date:results:date: results:ECG reviewed date:09/28/18 results:SR, RBBB date: results:          METS/Exercise Tolerance:     Hematologic:  - neg hematologic  ROS       Musculoskeletal:  - neg musculoskeletal ROS       GI/Hepatic:     (+) GERD       Renal/Genitourinary:  - ROS Renal section negative       Endo:     (+) type II DM Obesity, .      Psychiatric:  - neg psychiatric ROS       Infectious Disease:  - neg infectious disease ROS       Malignancy:      - no malignancy   Other:                     Physical Exam  Normal systems: cardiovascular, pulmonary and dental    Airway   Mallampati: I  TM distance: >3 FB  Neck ROM: full    Dental     Cardiovascular       Pulmonary                     Anesthesia Plan      History & Physical Review  History and physical reviewed and following examination; no interval change.    ASA Status:  3 .    NPO Status:  > 8 hours    Plan for MAC with Intravenous induction. Maintenance will be TIVA.  Reason for MAC:  Deep or markedly invasive procedure (G8)  PONV prophylaxis:  Ondansetron (or other 5HT-3)       Postoperative Care  Postoperative pain management:  IV analgesics.      Consents  Anesthetic plan, risks, benefits and alternatives discussed with:  Patient..              Procedure: Procedure(s):  Anal Exam, Anal Microscopy, Fulgeration of Dysplagia - Wound Class: IV-Dirty or Infected    HPI: Anselmo Rainey is a 68 year old male scheduled for exam under anesthesia.  Patient denies any previous anesthesia related complications.  Plan for MAC, propofol infusion, standard ASA monitors, single IV.    PMHx/PSHx:  Past Medical History:   Diagnosis Date     Congestive heart failure, unspecified      COPD (chronic  obstructive pulmonary disease) (H)      Coronary artery disease      Diabetes (H)      Gastro-oesophageal reflux disease      Heart disease      History of blood transfusion      History of rectal bleeding      Hypertension      Ileostomy in place (H)      Ischemic colitis (H)      Obese      PONV (postoperative nausea and vomiting)      Psoriasis      PTSD (post-traumatic stress disorder)      Seizures (H)      Sleep apnea     no cpap     Stented coronary artery 2011    2 STENTS       Past Surgical History:   Procedure Laterality Date     ANOSCOPY  5/28/2014    Procedure: ANOSCOPY;  Surgeon: Sarbjit Mai MD;  Location: [facility]     BIOPSY ANAL N/A 10/22/2014    Procedure: BIOPSY ANAL;  Surgeon: Sarbjit Mai MD;  Location: [facility]     C LAP,SURG,COLECTOMY,TOTAL,W/PROCTECTOMY       COLONOSCOPY       EXAM UNDER ANESTHESIA ANUS  5/28/2014    Procedure: EXAM UNDER ANESTHESIA ANUS;  Surgeon: Sarbjit Mai MD;  Location: [facility]     EXAM UNDER ANESTHESIA ANUS N/A 10/22/2014    Procedure: EXAM UNDER ANESTHESIA ANUS;  Surgeon: Sarbjit Mai MD;  Location: [facility]     EXAM UNDER ANESTHESIA ANUS N/A 4/26/2017    Procedure: EXAM UNDER ANESTHESIA ANUS;  Anal Exam with Anal Microscopy and Biopsies ;  Surgeon: Sabrjit Mai MD;  Location: [facility]     EXAM UNDER ANESTHESIA ANUS N/A 8/30/2017    Procedure: EXAM UNDER ANESTHESIA ANUS;  Examination Under anesthesia, Anal Microscopy With Biopsies, fulguration ;  Surgeon: Sarbjit Mai MD;  Location: [facility]     EXAM UNDER ANESTHESIA ANUS N/A 3/14/2018    Procedure: EXAM UNDER ANESTHESIA ANUS;  Anal Examination Under Anesthesia, Anal Microscopy fulgeration of dysplasia;  Surgeon: Sarbjit Mai MD;  Location: [facility]     EXAM UNDER ANESTHESIA RECTUM N/A 6/8/2015    Procedure: EXAM UNDER ANESTHESIA RECTUM;  Surgeon: Sarbjit Mai MD;  Location: [facility]     HC PERIANAL BIOPSY      with emergency surgery for post excision or post biopsy hemorrhage     ILEOSTOMY       ILEOSTOMY        MICROSCOPY ANAL N/A 10/22/2014    Procedure: MICROSCOPY ANAL;  Surgeon: Sarbjit Mai MD;  Location: [facility]     MICROSCOPY ANAL N/A 6/8/2015    Procedure: MICROSCOPY ANAL;  Surgeon: Sarbjit Mai MD;  Location: [facility]     MICROSCOPY ANAL N/A 4/26/2017    Procedure: MICROSCOPY ANAL;;  Surgeon: Sarbjit Mai MD;  Location: [facility]     MICROSCOPY ANAL N/A 8/30/2017    Procedure: MICROSCOPY ANAL;;  Surgeon: Sarbjit Mai MD;  Location: [facility]     MICROSCOPY ANAL N/A 3/14/2018    Procedure: MICROSCOPY ANAL;;  Surgeon: Srabjit Mai MD;  Location: [facility]     SIGMOIDOSCOPY FLEXIBLE  5/28/2014    Procedure: SIGMOIDOSCOPY FLEXIBLE;  Surgeon: Sarbjit Mai MD;  Location: [facility]     STENT           No current facility-administered medications on file prior to encounter.   Current Outpatient Prescriptions on File Prior to Encounter:  albuterol (ALBUTEROL) 108 (90 BASE) MCG/ACT inhaler Inhale 2 puffs into the lungs every 4 hours as needed   ASPIRIN PO Take 81 mg by mouth daily   CarBAMazepine (TEGRETOL PO) Take 400 mg by mouth 4 times daily   cholecalciferol (VITAMIN D3) 1000 UNIT tablet Take 2,000 Units by mouth 3 times daily (with meals)    CLONAZEPAM PO Take 0.5 mg by mouth as needed for anxiety   clotrimazole (LOTRIMIN) 1 % cream Apply topically 2 times daily   docusate sodium (COLACE) 100 MG tablet Take 100 mg by mouth daily   ferrous sulfate 325 (65 FE) MG tablet Take by mouth 3 times daily (with meals)    fluocinonide (LIDEX) 0.05 % cream Apply topically 2 times daily   FUROSEMIDE PO Take 40 mg by mouth daily   glipiZIDE (GLUCOTROL) 10 MG tablet Take 10 mg by mouth 2 times daily (before meals) 2 tabs bid   glucose 4 G CHEW Take 3 tablets by mouth daily as needed   isosorbide mononitrate (IMDUR) 30 MG 24 hr tablet Take 30 mg by mouth daily   loperamide (IMODIUM) 2 MG capsule Take 2 mg by mouth 4 times daily as needed   losartan (COZAAR) 50 MG tablet Take 100 mg by mouth daily    metFORMIN (GLUCOPHAGE) 850 MG tablet  Take 850 mg by mouth 2 times daily (with meals)   miconazole (MICATIN; MICRO GUARD) 2 % powder Apply topically as needed   mometasone (ASMANEX) 220 MCG/INH inhaler Inhale 2 puffs into the lungs 2 times daily   nitroglycerin (NITROSTAT) 0.4 MG SL tablet Place under the tongue every 5 minutes as needed   OMEPRAZOLE PO Take 20 mg by mouth 2 tabs bid   orlistat (XENICAL) 120 MG capsule Take 120 mg by mouth 3 times daily (with meals)   oxyCODONE-acetaminophen (PERCOCET) 5-325 MG per tablet Take 1-2 tablets by mouth 4 times daily as needed for pain Maximum 12 tablet(s) per day   simvastatin (ZOCOR) 40 MG tablet Take 20 mg by mouth At Bedtime   TERAZOSIN HCL PO Take 2 mg by mouth daily   TOPIRAMATE PO Take 50 mg by mouth daily   triamcinolone (KENALOG) 0.1 % cream Apply topically 2 times daily   buPROPion (WELLBUTRIN) 75 MG tablet Take 150 mg by mouth 3 times daily    desonide (DESOWEN) 0.05 % cream Apply topically 2 times daily   insulin glargine (LANTUS) 100 UNIT/ML injection Inject 55 Units Subcutaneous every morning        Social Hx:   Social History   Substance Use Topics     Smoking status: Former Smoker     Quit date: 11/14/1998     Smokeless tobacco: Never Used      Comment: Quit in 1999     Alcohol use No       Allergies:   Allergies   Allergen Reactions     Cephalexin Difficulty breathing     Gemfibrozil Other (See Comments)     unknown     Levofloxacin Difficulty breathing     Throat tightens and he gets a spotty rash     Lisinopril Cough         NPO Status: Per ASA Guidelines    Labs:    Blood Bank:  No results found for: ABO, RH, AS  BMP:  Recent Labs   Lab Test  03/14/18   0622   POTASSIUM  4.0     CBC:   No results for input(s): WBC, RBC, HGB, HCT, MCV, MCH, MCHC, RDW, PLT in the last 78786 hours.  Coags:  No results for input(s): INR, PTT, FIBR in the last 19822 hours.    Parvez Arias MD  Staff Anesthesiologist  [phone]

## 2018-10-10 NOTE — OR NURSING
Wife at bedside in phase 2 and updated of new EKG result, and notified that pt needs to follow up with cardiologist as soon as possible.

## 2018-10-10 NOTE — IP AVS SNAPSHOT
[facility] [address] [address]    Phone:  [phone]                                       After Visit Summary   10/10/2018    Anselmo Rainey    MRN: [id number]           After Visit Summary Signature Page     I have received my discharge instructions, and my questions have been answered. I have discussed any challenges I see with this plan with the nurse or doctor.    ..........................................................................................................................................  Patient/Patient Representative Signature      ..........................................................................................................................................  Patient Representative Print Name and Relationship to Patient    ..................................................               ................................................  Date                                   Time    ..........................................................................................................................................  Reviewed by Signature/Title    ...................................................              ..............................................  Date                                               Time          22EPIC Rev 08/18

## 2018-10-10 NOTE — OR NURSING
Dr. Arias at bedside in phase 2 looking at EKG. MD told pt to make an appointment with his cardiologist. MD does not want any labs drawn here, pt just to follow up with cardiologist. Pt still ok to discontinue today per anesthesia.

## 2018-10-10 NOTE — IP AVS SNAPSHOT
MRN:[id number]                      After Visit Summary   10/10/2018    Anselmo Rainey    MRN: [id number]           Thank you!     Thank you for choosing [facility] for your care. Our goal is always to provide you with excellent care. Hearing back from our patients is one way we can continue to improve our services. Please take a few minutes to complete the written survey that you may receive in the mail after you visit with us. Thank you!        Patient Information     Date Of Birth          1950        About your hospital stay     You were admitted on:  October 10, 2018 You last received care in the:  [facility] Day Surgery [facility]    You were discharged on:  October 10, 2018       Who to Call     For medical emergencies, please call 911.  For non-urgent questions about your medical care, please call your primary care provider or clinic, [phone]  For questions related to your surgery, please call your surgery clinic        Attending Provider     Provider Specialty    Sarbjit Mai MD Colon and Rectal Surgery       Primary Care Provider Office Phone # Fax #    Kehinde Diaz [phone]      Further instructions from your care team       Follow up with cardiologist AIRAMP per Dr. Arias with anesthesia's request regarding 2nd degree type 1 heart block result on post op EKG. Cardiologist to request copy of EKG.      Anorectal Surgery Instructions    What can I expect after anorectal surgery?  Most anorectal procedures are done as outpatient surgery, and you go home the same day as the procedure. A few surgical procedures will require that you stay in the hospital for about one to three days. No matter where the procedure is done or how long or short it takes, these recommendations will help you heal and feel more comfortable.    Medicines:  The anal area is very sensitive; you can expect to have some pain for up to 2-4 weeks after the procedure. Your doctor will give you a  prescription for one or more pain medications.    Take naprosyn 500 mg twice a day OR ibuprofen 600 mg four times a day     Take this on a regular basis (not as needed) following your surgery.     The drugs are best taken with food.  Do not take if it causes stomach upset or if you have a history of ulcers or gastritis. You can stop the naprosyn (or ibuprofen) or reduce the dose when you are feeling better.    DO NOT use naprosyn, ibuprofen, or other similar agents (eg. Advil or Aleve) if you have inflammatory bowel disease (Ulcerative Colitis or Crohn's disease) or if your doctor as advised you against using these medications    Take acetominaphen (Tylenol) 650-1000 mg four times a day.     Take this on a regular basis (not as needed) following surgery for pain control.     Take the lower dose if you are >65 years old or have liver disease. The maximum dose of acetominaphen is 4000 mg a day. You can stop the acetaminophen or reduce the dose when you are feeling better.    It is important to realize that many narcotic pain relievers (including vicodin, percocet, tylenol #3) also have acetaminophen, and excessive doses of acetaminophen can be dangerous, so do not take these in addition to acetominaphen.  You may take narcotics that don't contain acetominaphen such as oxycodone.      Take oxycodone AS NEEDED in addition to the acetominaphen and naprosyn.      Because narcotics have side effects (including constipation), you should reduce your use of these medications as tolerated as your pain improves.    *In general, the best strategy is to take (if you are able to tolerate it) the tylenol and naprosen on a regular basis until your pain has largely gone away. You can take the narcotic pain medicine as needed in addition to the tylenol and ibuprofen. As your pain begins to lessen, you should cut back on your narcotic use while continuing to take your regular tylenol and naprosyn doses.      Refilling prescriptions. If  you need additional pain medication, please call the triage nurse at [phone] during normal business hours (8 a.m. to 4 p.m., Monday though Friday) or have your pharmacy fax a refill request to [phone]. If you call after hours or on the weekends, the doctor on call may not know you personally and may not renew narcotic pain medication by phone. Call your primary care provider for all other medication refills.    Perineal care:  External gauze dressing can be removed the morning after surgery. If you have an adhesive dressing stuck to the incision, DO NOT remove this.   Tub baths:    If possible, take a tub bath immediately after each bowel movement.     Baths should be take at least 3 times daily for the first week to 10 days following your procedure. You should soak in the tub for 10 to 15 minutes each time with water as warm as you can tolerate.     Even after you go back to work, it is a good idea to sit in the tub in the morning, after returning from work, and again in the evening before bedtime.    Bleeding/Infection:    You can expect to have some bleeding after bowel movements, but it should stop soon after you wipe.     Use a wet cloth or perianal pad (Tucks or Preparation H pads) to gently wipe the area after each bowel movement.    Do not rub the anal area or use a lot of pressure.    Using a spray bottle filled with warm water helps loosen any remaining stool. Blot gently with a soft dry cloth or tissue paper.    Infection around the anal opening is not very common. The anal area has excellent blood supply, which helps the area to heal. Bloody discharge after bowel movements is normal and may last 2 to 4 weeks after your surgery. However, if you bleed between bowel movements and cannot get it to stop, call the triage nurse immediately [phone].    Bowel function:  Take a fiber supplement such as Metamucil, which is over the counter. It is important to drink six to eight glasses of water or  juice everyday when using fiber products.    If you do not have a bowel movement after 1-2 days:    Take Milk of Magnesia-2 tablespoons.       If there are no results, repeat this or add over the counter Miralax.      If you still do not have results, contact the clinic.     If there are no results, repeat this. Stop taking Milk of Magnesia or other laxatives if you begin to have diarrhea.    * Constipation will cause you to strain when you have a bowel movement. The hard stool will be difficult to pass, will increase pain and bleeding, and will slow down healing.  Try to avoid constipation and/or diarrhea as this can make the pain and bleeding worse.    * It is important to have regular bowel movements at least every other day and to keep your stool soft.  A high fiber diet, including at least four servings of fruits or vegetables daily, will help to keep your bowel movements regular and soft.    Activity:  After your procedure, there are no restrictions on your activity     except restrictions surrounding being on narcotics and in pain, such as no heavy machine operating or driving.     You may walk, climb stairs, ride in a car, and sit as tolerated.     It is helpful to avoid sitting in one position for long periods (2 or more hours).    After some surgeries, you may be told not to perform any lifting (more than 10 pounds) for several weeks after surgery.    When to call:  When do I need to call the doctor or triage nurse?    If you experience any of the problems listed here, call our triage nurse during business hours ([phone]).     The nurse will help you with your problem or have the doctor call you.     After hours and on weekends, please call the main hospital number ([phone]) and ask for the colon and rectal surgery person on call.     Some is available to help you 24 hours a day, seven days a week.    Call for:   ? Fever greater than 101 degrees   ? Chills   ? Foul-smelling drainage   ? Nausea  "and vomiting   ? Diarrhea - greater than 3 water stools in 24 hours   ? Constipation - no bowel movement after 3 days   ? Severe bleeding that does not stop soon after a bowel movement   ? Problems with the incision, including increased pain, swelling, or redness        Pending Results     Date and Time Order Name Status Description    10/10/2018 0835 EKG 12-lead, complete Preliminary             Admission Information     Date & Time Provider Department Dept. Phone    10/10/2018 Sarbjit Mai MD Same Day Surgery [facility] [place] [phone]      Your Vitals Were     Blood Pressure Temperature Respirations Height Weight Pulse Oximetry    102/58 97.5  F (36.4  C) (Oral) 13 1.702 m (5' 7\") 121.7 kg (268 lb 4.8 oz) 100%    BMI (Body Mass Index)                   42.02 kg/m2           [facility] Information     [employer] lets you send messages to your doctor, view your test results, renew your prescriptions, schedule appointments and more. To sign up, go to [email or web address] . Click on \"Log in\" on the left side of the screen, which will take you to the Welcome page. Then click on \"Sign up Now\" on the right side of the page.     You will be asked to enter the access code listed below, as well as some personal information. Please follow the directions to create your username and password.     Your access code is: [email or web address]  Expires: 2019  9:13 AM     Your access code will  in 90 days. If you need help or a new code, please call your [facility] clinic or [phone].        Care EveryWhere ID     This is your Care EveryWhere ID. This could be used by other organizations to access your [facility] medical records  [id number]        Equal Access to Services     [facility] DOTTY : Hadii ector Her, wachinoda luqadaha, qaybta kaalmada blake, wesley ellis. So [facility] [phone].    ATENCIÓN: Si habla español, tiene a lyman disposición servicios gratuitos de asistencia lingüística. " Jony mejía [phone].    We comply with applicable federal civil rights laws and [place] laws. We do not discriminate on the basis of race, color, national origin, age, disability, sex, sexual orientation, or gender identity.               Review of your medicines      START taking        Dose / Directions    oxyCODONE IR 5 MG tablet   Commonly known as:  ROXICODONE   Used for:  Proctitis        Dose:  5 mg   Take 1 tablet (5 mg) by mouth every 6 hours as needed for pain   Quantity:  15 tablet   Refills:  0         CONTINUE these medicines which have NOT CHANGED        Dose / Directions    ASPIRIN PO        Dose:  81 mg   Take 81 mg by mouth daily   Refills:  0       buPROPion 75 MG tablet   Commonly known as:  WELLBUTRIN        Dose:  150 mg   Take 150 mg by mouth 3 times daily   Refills:  0       cholecalciferol 1000 UNIT tablet   Commonly known as:  vitamin D3        Dose:  2000 Units   Take 2,000 Units by mouth 3 times daily (with meals)   Refills:  0       CLONAZEPAM PO        Dose:  0.5 mg   Take 0.5 mg by mouth as needed for anxiety   Refills:  0       clotrimazole 1 % cream   Commonly known as:  LOTRIMIN        Apply topically 2 times daily   Refills:  0       desonide 0.05 % cream   Commonly known as:  DESOWEN        Apply topically 2 times daily   Refills:  0       docusate sodium 100 MG tablet   Commonly known as:  COLACE   Used for:  Drug-induced constipation        Dose:  100 mg   Take 100 mg by mouth daily   Quantity:  60 tablet   Refills:  1       ferrous sulfate 325 (65 Fe) MG tablet   Commonly known as:  IRON        Take by mouth 3 times daily (with meals)   Refills:  0       fluocinonide 0.05 % cream   Commonly known as:  LIDEX        Apply topically 2 times daily   Refills:  0       FUROSEMIDE PO   Indication:  Edema        Dose:  40 mg   Take 40 mg by mouth daily   Refills:  0       glipiZIDE 10 MG tablet   Commonly known as:  GLUCOTROL        Dose:  10 mg   Take 10 mg by mouth 2 times daily  (before meals) 2 tabs bid   Refills:  0       glucose 4 g Chew chewable tablet        Dose:  3 tablet   Take 3 tablets by mouth daily as needed   Refills:  0       insulin glargine 100 UNIT/ML injection   Commonly known as:  LANTUS        Dose:  55 Units   Inject 55 Units Subcutaneous every morning   Refills:  0       isosorbide mononitrate 30 MG 24 hr tablet   Commonly known as:  IMDUR        Dose:  30 mg   Take 30 mg by mouth daily   Refills:  0       loperamide 2 MG capsule   Commonly known as:  IMODIUM        Dose:  2 mg   Take 2 mg by mouth 4 times daily as needed   Refills:  0       losartan 50 MG tablet   Commonly known as:  COZAAR        Dose:  100 mg   Take 100 mg by mouth daily   Refills:  0       metFORMIN 850 MG tablet   Commonly known as:  GLUCOPHAGE        Dose:  850 mg   Take 850 mg by mouth 2 times daily (with meals)   Refills:  0       miconazole 2 % powder   Commonly known as:  MICATIN; MICRO GUARD        Apply topically as needed   Refills:  0       mometasone 220 MCG/INH Inhaler   Commonly known as:  ASMANEX        Dose:  2 puff   Inhale 2 puffs into the lungs 2 times daily   Refills:  0       nitroGLYcerin 0.4 MG sublingual tablet   Commonly known as:  NITROSTAT        Place under the tongue every 5 minutes as needed   Refills:  0       OMEPRAZOLE PO        Dose:  20 mg   Take 20 mg by mouth 2 tabs bid   Refills:  0       orlistat 120 MG capsule   Commonly known as:  XENICAL        Dose:  120 mg   Take 120 mg by mouth 3 times daily (with meals)   Refills:  0       PROAIR  (90 Base) MCG/ACT inhaler   Generic drug:  albuterol        Dose:  2 puff   Inhale 2 puffs into the lungs every 4 hours as needed   Refills:  0       simvastatin 40 MG tablet   Commonly known as:  ZOCOR        Dose:  20 mg   Take 20 mg by mouth At Bedtime   Refills:  0       TEGRETOL PO        Dose:  400 mg   Take 400 mg by mouth 4 times daily   Refills:  0       TERAZOSIN HCL PO        Dose:  2 mg   Take 2 mg by mouth  daily   Refills:  0       TOPIRAMATE PO        Dose:  50 mg   Take 50 mg by mouth daily   Refills:  0       triamcinolone 0.1 % cream   Commonly known as:  KENALOG        Apply topically 2 times daily   Refills:  0         STOP taking     oxyCODONE-acetaminophen 5-325 MG per tablet   Commonly known as:  PERCOCET                Where to get your medicines      Some of these will need a paper prescription and others can be bought over the counter. Ask your nurse if you have questions.     Bring a paper prescription for each of these medications     oxyCODONE IR 5 MG tablet                Protect others around you: Learn how to safely use, store and throw away your medicines at www.disposemymeds.org.        Information about OPIOIDS     PRESCRIPTION OPIOIDS: WHAT YOU NEED TO KNOW   We gave you an opioid (narcotic) pain medicine. It is important to manage your pain, but opioids are not always the best choice. You should first try all the other options your care team gave you. Take this medicine for as short a time (and as few doses) as possible.    Some activities can increase your pain, such as bandage changes or therapy sessions. It may help to take your pain medicine 30 to 60 minutes before these activities. Reduce your stress by getting enough sleep, working on hobbies you enjoy and practicing relaxation or meditation. Talk to your care team about ways to manage your pain beyond prescription opioids.    These medicines have risks:    DO NOT drive when on new or higher doses of pain medicine. These medicines can affect your alertness and reaction times, and you could be arrested for driving under the influence (DUI). If you need to use opioids long-term, talk to your care team about driving.    DO NOT operate heavy machinery    DO NOT do any other dangerous activities while taking these medicines.    DO NOT drink any alcohol while taking these medicines.     If the opioid prescribed includes acetaminophen, DO NOT  take with any other medicines that contain acetaminophen. Read all labels carefully. Look for the word  acetaminophen  or  Tylenol.  Ask your pharmacist if you have questions or are unsure.    You can get addicted to pain medicines, especially if you have a history of addiction (chemical, alcohol or substance dependence). Talk to your care team about ways to reduce this risk.    All opioids tend to cause constipation. Drink plenty of water and eat foods that have a lot of fiber, such as fruits, vegetables, prune juice, apple juice and high-fiber cereal. Take a laxative (Miralax, milk of magnesia, Colace, Senna) if you don t move your bowels at least every other day. Other side effects include upset stomach, sleepiness, dizziness, throwing up, tolerance (needing more of the medicine to have the same effect), physical dependence and slowed breathing.    Store your pills in a secure place, locked if possible. We will not replace any lost or stolen medicine. If you don t finish your medicine, please throw away (dispose) as directed by your pharmacist. The [place] [employer]ution [employer] has more information about safe disposal: https://www.pca.[facility].mn.us/living-green/managing-unwanted-medications             Medication List: This is a list of all your medications and when to take them. Check marks below indicate your daily home schedule. Keep this list as a reference.      Medications           Morning Afternoon Evening Bedtime As Needed    ASPIRIN PO   Take 81 mg by mouth daily                                buPROPion 75 MG tablet   Commonly known as:  WELLBUTRIN   Take 150 mg by mouth 3 times daily                                cholecalciferol 1000 UNIT tablet   Commonly known as:  vitamin D3   Take 2,000 Units by mouth 3 times daily (with meals)                                CLONAZEPAM PO   Take 0.5 mg by mouth as needed for anxiety                                clotrimazole 1 % cream   Commonly known as:   LOTRIMIN   Apply topically 2 times daily                                desonide 0.05 % cream   Commonly known as:  DESOWEN   Apply topically 2 times daily                                docusate sodium 100 MG tablet   Commonly known as:  COLACE   Take 100 mg by mouth daily                                ferrous sulfate 325 (65 Fe) MG tablet   Commonly known as:  IRON   Take by mouth 3 times daily (with meals)                                fluocinonide 0.05 % cream   Commonly known as:  LIDEX   Apply topically 2 times daily                                FUROSEMIDE PO   Take 40 mg by mouth daily                                glipiZIDE 10 MG tablet   Commonly known as:  GLUCOTROL   Take 10 mg by mouth 2 times daily (before meals) 2 tabs bid                                glucose 4 g Chew chewable tablet   Take 3 tablets by mouth daily as needed                                insulin glargine 100 UNIT/ML injection   Commonly known as:  LANTUS   Inject 55 Units Subcutaneous every morning                                isosorbide mononitrate 30 MG 24 hr tablet   Commonly known as:  IMDUR   Take 30 mg by mouth daily                                loperamide 2 MG capsule   Commonly known as:  IMODIUM   Take 2 mg by mouth 4 times daily as needed                                losartan 50 MG tablet   Commonly known as:  COZAAR   Take 100 mg by mouth daily                                metFORMIN 850 MG tablet   Commonly known as:  GLUCOPHAGE   Take 850 mg by mouth 2 times daily (with meals)                                miconazole 2 % powder   Commonly known as:  MICATIN; MICRO GUARD   Apply topically as needed                                mometasone 220 MCG/INH Inhaler   Commonly known as:  ASMANEX   Inhale 2 puffs into the lungs 2 times daily                                nitroGLYcerin 0.4 MG sublingual tablet   Commonly known as:  NITROSTAT   Place under the tongue every 5 minutes as needed                                 OMEPRAZOLE PO   Take 20 mg by mouth 2 tabs bid                                orlistat 120 MG capsule   Commonly known as:  XENICAL   Take 120 mg by mouth 3 times daily (with meals)                                oxyCODONE IR 5 MG tablet   Commonly known as:  ROXICODONE   Take 1 tablet (5 mg) by mouth every 6 hours as needed for pain                                PROAIR  (90 Base) MCG/ACT inhaler   Inhale 2 puffs into the lungs every 4 hours as needed   Generic drug:  albuterol                                simvastatin 40 MG tablet   Commonly known as:  ZOCOR   Take 20 mg by mouth At Bedtime                                TEGRETOL PO   Take 400 mg by mouth 4 times daily                                TERAZOSIN HCL PO   Take 2 mg by mouth daily                                TOPIRAMATE PO   Take 50 mg by mouth daily                                triamcinolone 0.1 % cream   Commonly known as:  KENALOG   Apply topically 2 times daily

## 2018-10-10 NOTE — PROGRESS NOTES
SPIRITUAL HEALTH SERVICES  [facility] 3C   PRE-SURGERY VISIT    Had pre-surgery visit with pt.  Provided spiritual support, prayer.     Sulma Hinson  Chaplain Resident  Pager  [phone]

## 2018-10-10 NOTE — OR NURSING
Dr. Arias at bedside in phase 2. Aware of cardiac rhythm and current VS. No further orders at this time

## 2018-10-12 LAB — INTERPRETATION ECG - MUSE: NORMAL

## 2018-10-16 ENCOUNTER — CARE COORDINATION (OUTPATIENT)
Dept: SURGERY | Facility: CLINIC | Age: 68
End: 2018-10-16

## 2018-10-16 NOTE — PROGRESS NOTES
Patient was called to assess how he is doing after his procedure and hospitalization. The direct phone number was left in a voicemail with a request for the patient to call back at his earliest convenience.     JOSE Benavides Care Coordinator  [facility]  Phone: [phone]

## 2018-10-18 NOTE — PROGRESS NOTES
RN Post Op Care Coordination Note     Called to check on patient postoperatively after hospital discharge.       Patient is s/p Anal Exam, Anal Microscopy, Fulgeration of Dysplagia.      Pain is well controlled with no interventions.     Patient is eating and drinking normally. Encouraged patient to drink 8-10 glasses of water a day.     Patient is passing flats, is having soft brown bowel movements.    Patient is voiding normally and urine is light in color.    Patient is not set up with home care.      Patient Denies nausea and vomiting.    Patient Denies any fevers or chills.    Follow up is not set up but needs to follow up in 6 months.   Encouraged the patient to contact the clinic in the meantime with any fevers, chills, nausea, vomiting, dizziness, lightheadedness, uncontrolled pain, changes to the incisions, or with any questions or concerns.    Patient's questions were answered to their stated satisfaction and they are in agreement with this plan.    JOSE Benavides Care Coordinator  [facility]  [facility]  Phone: [phone]

## 2018-10-19 NOTE — PROGRESS NOTES
Patient called back requesting his records from the [facility] and [facility] These were sent at his request to [phone] attn: Dr. Diaz.

## 2019-02-01 ENCOUNTER — TELEPHONE (OUTPATIENT)
Dept: SURGERY | Facility: CLINIC | Age: 69
End: 2019-02-01

## 2019-02-01 DIAGNOSIS — K62.82 ANAL DYSPLASIA: Primary | ICD-10-CM

## 2019-02-06 NOTE — TELEPHONE ENCOUNTER
Patient is scheduled for surgery with Dr. Mai       Spoke or left message with: Long    Date of Surgery: 5/9/2019    Location: [place]    H&P: Scheduled with PAC     Additional imaging/appointments: NA    Surgery packet: Will mail     Additional comments: Patient wanted to wait until May for this procedure

## 2019-02-07 ENCOUNTER — HOSPITAL ENCOUNTER (OUTPATIENT)
Facility: CLINIC | Age: 69
End: 2019-02-07
Attending: COLON & RECTAL SURGERY | Admitting: COLON & RECTAL SURGERY
Payer: COMMERCIAL

## 2019-02-08 ENCOUNTER — TELEPHONE (OUTPATIENT)
Dept: SURGERY | Facility: CLINIC | Age: 69
End: 2019-02-08

## 2019-02-08 NOTE — TELEPHONE ENCOUNTER
Talked with patient regarding upcoming Microscopy appointment in the OR. Patient stated some concern that [facility] needed [facility] in [facility] for this to happen. I advised him that I would be in contact with Sherry, our HRA  and see if she could help with this process. Patient was in agreement with this plan and was appreciative of the help.  Mayte Segal, EMT

## 2019-03-21 ENCOUNTER — TELEPHONE (OUTPATIENT)
Dept: SURGERY | Facility: CLINIC | Age: 69
End: 2019-03-21

## 2019-03-21 NOTE — TELEPHONE ENCOUNTER
[facility] Call Center    Phone Message    May a detailed message be left on voicemail: yes    Reason for Call: Other: Patient is calling to let  know he had surgery on 02/25/19 at [facility] in [place]. He wants to make sure that surgery will not interfer with his surgery here on 05/09. Please follow up asap with the patient. Thank you.     Action Taken: Message routed to:  [facility] ([facility] colon and rectal

## 2019-03-21 NOTE — TELEPHONE ENCOUNTER
Long called today inquiring about the time of his procedure. I let him know that he would receive that information at the time of his PAC appointment. He let me know [facility] He also had some questions about his recent hospitalization and if that was going to interfere with his procedure on 5/9. I went ahead and canceled the PAC appointment per Long's request. He stated that [facility] will [facility].

## 2019-04-29 ENCOUNTER — TELEPHONE (OUTPATIENT)
Dept: SURGERY | Facility: CLINIC | Age: 69
End: 2019-04-29

## 2019-05-06 ENCOUNTER — HOSPITAL ENCOUNTER (OUTPATIENT)
Facility: AMBULATORY SURGERY CENTER | Age: 69
End: 2019-05-06
Attending: COLON & RECTAL SURGERY
Payer: MEDICARE

## 2019-06-05 ENCOUNTER — TELEPHONE (OUTPATIENT)
Dept: SURGERY | Facility: CLINIC | Age: 69
End: 2019-06-05

## 2019-06-05 NOTE — TELEPHONE ENCOUNTER
Per RNCC, this patient needs to have his procedure done in the hospital.     This has been moved to the [place] on 6/21, same time.

## 2019-06-10 ENCOUNTER — TELEPHONE (OUTPATIENT)
Dept: SURGERY | Facility: CLINIC | Age: 69
End: 2019-06-10

## 2019-06-10 NOTE — TELEPHONE ENCOUNTER
Patient information about his EKG history:  Confirmed w/ patient that [facility] with [facility] number, as per pt request. [facility]. Patient states he has history of abnormal EKG, for questions/concerns, call Marcela Zamarripa [facility], tel number [phone].  Kayla Cook/[phone]

## 2019-06-20 ENCOUNTER — ANESTHESIA EVENT (OUTPATIENT)
Dept: SURGERY | Facility: CLINIC | Age: 69
End: 2019-06-20
Payer: COMMERCIAL

## 2019-06-21 ENCOUNTER — ANESTHESIA (OUTPATIENT)
Dept: SURGERY | Facility: CLINIC | Age: 69
End: 2019-06-21
Payer: COMMERCIAL

## 2019-06-21 ENCOUNTER — HOSPITAL ENCOUNTER (OUTPATIENT)
Facility: CLINIC | Age: 69
Discharge: HOME | End: 2019-06-21
Attending: COLON & RECTAL SURGERY | Admitting: COLON & RECTAL SURGERY
Payer: COMMERCIAL

## 2019-06-21 VITALS
WEIGHT: 246.03 LBS | RESPIRATION RATE: 17 BRPM | HEIGHT: 67 IN | OXYGEN SATURATION: 100 % | SYSTOLIC BLOOD PRESSURE: 117 MMHG | HEART RATE: 71 BPM | DIASTOLIC BLOOD PRESSURE: 70 MMHG | TEMPERATURE: 97.8 F | BODY MASS INDEX: 38.62 KG/M2

## 2019-06-21 DIAGNOSIS — K62.82 ANAL DYSPLASIA: Primary | ICD-10-CM

## 2019-06-21 DIAGNOSIS — G89.18 POST-OP PAIN: ICD-10-CM

## 2019-06-21 LAB
CREAT SERPL-MCNC: 1.09 MG/DL (ref 0.66–1.25)
GFR SERPL CREATININE-BSD FRML MDRD: 69 ML/MIN/{1.73_M2}
GLUCOSE BLDC GLUCOMTR-MCNC: 241 MG/DL (ref 70–99)
GLUCOSE BLDC GLUCOMTR-MCNC: 252 MG/DL (ref 70–99)
GLUCOSE BLDC GLUCOMTR-MCNC: 308 MG/DL (ref 70–99)
HGB BLD-MCNC: 12.6 G/DL (ref 13.3–17.7)
POTASSIUM SERPL-SCNC: 4.4 MMOL/L (ref 3.4–5.3)

## 2019-06-21 PROCEDURE — 85018 HEMOGLOBIN: CPT | Performed by: ANESTHESIOLOGY

## 2019-06-21 PROCEDURE — 25800030 ZZH RX IP 258 OP 636: Performed by: NURSE ANESTHETIST, CERTIFIED REGISTERED

## 2019-06-21 PROCEDURE — 36000051 ZZH SURGERY LEVEL 2 1ST 30 MIN - UMMC: Performed by: COLON & RECTAL SURGERY

## 2019-06-21 PROCEDURE — 82565 ASSAY OF CREATININE: CPT | Performed by: ANESTHESIOLOGY

## 2019-06-21 PROCEDURE — 71000027 ZZH RECOVERY PHASE 2 EACH 15 MINS: Performed by: COLON & RECTAL SURGERY

## 2019-06-21 PROCEDURE — 25000128 H RX IP 250 OP 636: Performed by: COLON & RECTAL SURGERY

## 2019-06-21 PROCEDURE — 36000053 ZZH SURGERY LEVEL 2 EA 15 ADDTL MIN - UMMC: Performed by: COLON & RECTAL SURGERY

## 2019-06-21 PROCEDURE — 27210794 ZZH OR GENERAL SUPPLY STERILE: Performed by: COLON & RECTAL SURGERY

## 2019-06-21 PROCEDURE — 25000125 ZZHC RX 250: Performed by: COLON & RECTAL SURGERY

## 2019-06-21 PROCEDURE — 36415 COLL VENOUS BLD VENIPUNCTURE: CPT | Performed by: ANESTHESIOLOGY

## 2019-06-21 PROCEDURE — 84132 ASSAY OF SERUM POTASSIUM: CPT | Performed by: ANESTHESIOLOGY

## 2019-06-21 PROCEDURE — 40000170 ZZH STATISTIC PRE-PROCEDURE ASSESSMENT II: Performed by: COLON & RECTAL SURGERY

## 2019-06-21 PROCEDURE — 25000132 ZZH RX MED GY IP 250 OP 250 PS 637: Performed by: ANESTHESIOLOGY

## 2019-06-21 PROCEDURE — 37000009 ZZH ANESTHESIA TECHNICAL FEE, EACH ADDTL 15 MIN: Performed by: COLON & RECTAL SURGERY

## 2019-06-21 PROCEDURE — 37000008 ZZH ANESTHESIA TECHNICAL FEE, 1ST 30 MIN: Performed by: COLON & RECTAL SURGERY

## 2019-06-21 PROCEDURE — 25000128 H RX IP 250 OP 636: Performed by: NURSE ANESTHETIST, CERTIFIED REGISTERED

## 2019-06-21 PROCEDURE — 25000131 ZZH RX MED GY IP 250 OP 636 PS 637: Performed by: ANESTHESIOLOGY

## 2019-06-21 PROCEDURE — 82962 GLUCOSE BLOOD TEST: CPT | Mod: 91

## 2019-06-21 PROCEDURE — 88305 TISSUE EXAM BY PATHOLOGIST: CPT | Performed by: COLON & RECTAL SURGERY

## 2019-06-21 RX ORDER — HYDROMORPHONE HYDROCHLORIDE 1 MG/ML
.3-.5 INJECTION, SOLUTION INTRAMUSCULAR; INTRAVENOUS; SUBCUTANEOUS EVERY 10 MIN PRN
Status: CANCELLED | OUTPATIENT
Start: 2019-06-21

## 2019-06-21 RX ORDER — ACETAMINOPHEN 325 MG/1
975 TABLET ORAL ONCE
Status: COMPLETED | OUTPATIENT
Start: 2019-06-21 | End: 2019-06-21

## 2019-06-21 RX ORDER — ACETIC ACID 5 %
LIQUID (ML) MISCELLANEOUS PRN
Status: DISCONTINUED | OUTPATIENT
Start: 2019-06-21 | End: 2019-06-21 | Stop reason: HOSPADM

## 2019-06-21 RX ORDER — SODIUM CHLORIDE, SODIUM LACTATE, POTASSIUM CHLORIDE, CALCIUM CHLORIDE 600; 310; 30; 20 MG/100ML; MG/100ML; MG/100ML; MG/100ML
INJECTION, SOLUTION INTRAVENOUS CONTINUOUS
Status: CANCELLED | OUTPATIENT
Start: 2019-06-21

## 2019-06-21 RX ORDER — FENTANYL CITRATE 50 UG/ML
25-50 INJECTION, SOLUTION INTRAMUSCULAR; INTRAVENOUS
Status: CANCELLED | OUTPATIENT
Start: 2019-06-21

## 2019-06-21 RX ORDER — PROPOFOL 10 MG/ML
INJECTION, EMULSION INTRAVENOUS CONTINUOUS PRN
Status: DISCONTINUED | OUTPATIENT
Start: 2019-06-21 | End: 2019-06-21

## 2019-06-21 RX ORDER — SODIUM CHLORIDE, SODIUM LACTATE, POTASSIUM CHLORIDE, CALCIUM CHLORIDE 600; 310; 30; 20 MG/100ML; MG/100ML; MG/100ML; MG/100ML
INJECTION, SOLUTION INTRAVENOUS CONTINUOUS PRN
Status: DISCONTINUED | OUTPATIENT
Start: 2019-06-21 | End: 2019-06-21

## 2019-06-21 RX ORDER — PROPOFOL 10 MG/ML
INJECTION, EMULSION INTRAVENOUS PRN
Status: DISCONTINUED | OUTPATIENT
Start: 2019-06-21 | End: 2019-06-21

## 2019-06-21 RX ORDER — MEPERIDINE HYDROCHLORIDE 50 MG/ML
12.5 INJECTION INTRAMUSCULAR; INTRAVENOUS; SUBCUTANEOUS
Status: CANCELLED | OUTPATIENT
Start: 2019-06-21

## 2019-06-21 RX ORDER — ACETAMINOPHEN 325 MG/1
325-650 TABLET ORAL EVERY 4 HOURS PRN
Qty: 10 TABLET | Refills: 0 | Status: SHIPPED | OUTPATIENT
Start: 2019-06-21

## 2019-06-21 RX ORDER — ACETAMINOPHEN 325 MG/1
975 TABLET ORAL ONCE
Status: DISCONTINUED | OUTPATIENT
Start: 2019-06-21 | End: 2019-06-21 | Stop reason: HOSPADM

## 2019-06-21 RX ORDER — NICOTINE POLACRILEX 4 MG
15-30 LOZENGE BUCCAL
Status: DISCONTINUED | OUTPATIENT
Start: 2019-06-21 | End: 2019-06-21 | Stop reason: HOSPADM

## 2019-06-21 RX ORDER — ONDANSETRON 2 MG/ML
4 INJECTION INTRAMUSCULAR; INTRAVENOUS EVERY 30 MIN PRN
Status: CANCELLED | OUTPATIENT
Start: 2019-06-21

## 2019-06-21 RX ORDER — ONDANSETRON 4 MG/1
4 TABLET, ORALLY DISINTEGRATING ORAL
Status: CANCELLED | OUTPATIENT
Start: 2019-06-21

## 2019-06-21 RX ORDER — ONDANSETRON 4 MG/1
4 TABLET, ORALLY DISINTEGRATING ORAL EVERY 30 MIN PRN
Status: CANCELLED | OUTPATIENT
Start: 2019-06-21

## 2019-06-21 RX ORDER — BUPIVACAINE HYDROCHLORIDE 5 MG/ML
INJECTION, SOLUTION PERINEURAL PRN
Status: DISCONTINUED | OUTPATIENT
Start: 2019-06-21 | End: 2019-06-21 | Stop reason: HOSPADM

## 2019-06-21 RX ORDER — SODIUM CHLORIDE, SODIUM LACTATE, POTASSIUM CHLORIDE, CALCIUM CHLORIDE 600; 310; 30; 20 MG/100ML; MG/100ML; MG/100ML; MG/100ML
INJECTION, SOLUTION INTRAVENOUS CONTINUOUS
Status: DISCONTINUED | OUTPATIENT
Start: 2019-06-21 | End: 2019-06-21 | Stop reason: HOSPADM

## 2019-06-21 RX ORDER — DEXTROSE MONOHYDRATE 25 G/50ML
25-50 INJECTION, SOLUTION INTRAVENOUS
Status: DISCONTINUED | OUTPATIENT
Start: 2019-06-21 | End: 2019-06-21 | Stop reason: HOSPADM

## 2019-06-21 RX ORDER — LIDOCAINE 40 MG/G
CREAM TOPICAL
Status: DISCONTINUED | OUTPATIENT
Start: 2019-06-21 | End: 2019-06-21 | Stop reason: HOSPADM

## 2019-06-21 RX ORDER — NALOXONE HYDROCHLORIDE 0.4 MG/ML
.1-.4 INJECTION, SOLUTION INTRAMUSCULAR; INTRAVENOUS; SUBCUTANEOUS
Status: CANCELLED | OUTPATIENT
Start: 2019-06-21 | End: 2019-06-22

## 2019-06-21 RX ORDER — FENTANYL CITRATE 50 UG/ML
INJECTION, SOLUTION INTRAMUSCULAR; INTRAVENOUS PRN
Status: DISCONTINUED | OUTPATIENT
Start: 2019-06-21 | End: 2019-06-21

## 2019-06-21 RX ADMIN — ACETAMINOPHEN 975 MG: 325 TABLET, FILM COATED ORAL at 11:05

## 2019-06-21 RX ADMIN — PROPOFOL 75 MCG/KG/MIN: 10 INJECTION, EMULSION INTRAVENOUS at 12:52

## 2019-06-21 RX ADMIN — INSULIN GLARGINE 15 UNITS: 100 INJECTION, SOLUTION SUBCUTANEOUS at 11:45

## 2019-06-21 RX ADMIN — MIDAZOLAM 2 MG: 1 INJECTION INTRAMUSCULAR; INTRAVENOUS at 12:37

## 2019-06-21 RX ADMIN — FENTANYL CITRATE 50 MCG: 50 INJECTION, SOLUTION INTRAMUSCULAR; INTRAVENOUS at 12:52

## 2019-06-21 RX ADMIN — SODIUM CHLORIDE, POTASSIUM CHLORIDE, SODIUM LACTATE AND CALCIUM CHLORIDE: 600; 310; 30; 20 INJECTION, SOLUTION INTRAVENOUS at 12:37

## 2019-06-21 RX ADMIN — PROPOFOL 10 MG: 10 INJECTION, EMULSION INTRAVENOUS at 13:15

## 2019-06-21 ASSESSMENT — MIFFLIN-ST. JEOR: SCORE: 1844.63

## 2019-06-21 NOTE — BRIEF OP NOTE
[facility], [place]    Brief Operative Note    Pre-operative diagnosis: High Grade Dysplasia  Post-operative diagnosis Same  Procedure: Procedure(s):  Exam Under Anesthesia, High Resolution Anoscopy, biopsies and fulguration  Surgeon: Surgeon(s) and Role:     * Sarbjit Mai MD - Primary     * Simi Barajas MD - Resident - Assisting  Anesthesia: Monitor Anesthesia Care   Estimated blood loss: None  Drains: None  Specimens:   ID Type Source Tests Collected by Time Destination   A : right buttock Tissue Buttock SURGICAL PATHOLOGY EXAM Sarbjit Mai MD 6/21/2019  1:28 PM      Findings:   right buttock skin lesion ~5cm from anal verge, sent for path.  Complications: None.  Implants:  * No implants in log *     - follow up exam in 1 year

## 2019-06-21 NOTE — DISCHARGE INSTRUCTIONS
Anorectal Surgery Instructions    What can I expect after anorectal surgery?  Most anorectal procedures are done as outpatient surgery, and you go home the same day as the procedure. A few surgical procedures will require that you stay in the hospital for about one to three days. No matter where the procedure is done or how long or short it takes, these recommendations will help you heal and feel more comfortable.    Medicines:  The anal area is very sensitive; you can expect to have some pain for up to 2-4 weeks after the procedure. Your doctor will give you a prescription for one or more pain medications.    Take naprosyn 500 mg twice a day OR ibuprofen 600 mg four times a day     Take this on a regular basis (not as needed) following your surgery.     The drugs are best taken with food.  Do not take if it causes stomach upset or if you have a history of ulcers or gastritis. You can stop the naprosyn (or ibuprofen) or reduce the dose when you are feeling better.    DO NOT use naprosyn, ibuprofen, or other similar agents (eg. Advil or Aleve) if you have inflammatory bowel disease (Ulcerative Colitis or Crohn's disease) or if your doctor as advised you against using these medications    Take acetominaphen (Tylenol) 650-1000 mg four times a day.     Take this on a regular basis (not as needed) following surgery for pain control.     Take the lower dose if you are >65 years old or have liver disease. The maximum dose of acetominaphen is 4000 mg a day. You can stop the acetaminophen or reduce the dose when you are feeling better.    It is important to realize that many narcotic pain relievers (including vicodin, percocet, tylenol #3) also have acetaminophen, and excessive doses of acetaminophen can be dangerous, so do not take these in addition to acetominaphen.  You may take narcotics that don't contain acetominaphen such as oxycodone.      Take oxycodone AS NEEDED in addition to the acetominaphen and naprosyn.       Because narcotics have side effects (including constipation), you should reduce your use of these medications as tolerated as your pain improves.    *In general, the best strategy is to take (if you are able to tolerate it) the tylenol and naprosen on a regular basis until your pain has largely gone away. You can take the narcotic pain medicine as needed in addition to the tylenol and ibuprofen. As your pain begins to lessen, you should cut back on your narcotic use while continuing to take your regular tylenol and naprosyn doses.      Refilling prescriptions. If you need additional pain medication, please call the triage nurse at [phone] during normal business hours (8 a.m. to 4 p.m., Monday though Friday) or have your pharmacy fax a refill request to [phone]. If you call after hours or on the weekends, the doctor on call may not know you personally and may not renew narcotic pain medication by phone. Call your primary care provider for all other medication refills.    Perineal care:  External gauze dressing can be removed the morning after surgery. If you have an adhesive dressing stuck to the incision, DO NOT remove this.   Tub baths:    If possible, take a tub bath immediately after each bowel movement.     Baths should be take at least 3 times daily for the first week to 10 days following your procedure. You should soak in the tub for 10 to 15 minutes each time with water as warm as you can tolerate.     Even after you go back to work, it is a good idea to sit in the tub in the morning, after returning from work, and again in the evening before bedtime.    Bleeding/Infection:    You can expect to have some bleeding after bowel movements, but it should stop soon after you wipe.     Use a wet cloth or perianal pad (Tucks or Preparation H pads) to gently wipe the area after each bowel movement.    Do not rub the anal area or use a lot of pressure.    Using a spray bottle filled with warm water helps  loosen any remaining stool. Blot gently with a soft dry cloth or tissue paper.    Infection around the anal opening is not very common. The anal area has excellent blood supply, which helps the area to heal. Bloody discharge after bowel movements is normal and may last 2 to 4 weeks after your surgery. However, if you bleed between bowel movements and cannot get it to stop, call the triage nurse immediately [phone].    Bowel function:  Take a fiber supplement such as Metamucil, which is over the counter. It is important to drink six to eight glasses of water or juice everyday when using fiber products.    If you do not have a bowel movement after 1-2 days:    Take Milk of Magnesia-2 tablespoons.       If there are no results, repeat this or add over the counter Miralax.      If you still do not have results, contact the clinic.     If there are no results, repeat this. Stop taking Milk of Magnesia or other laxatives if you begin to have diarrhea.    * Constipation will cause you to strain when you have a bowel movement. The hard stool will be difficult to pass, will increase pain and bleeding, and will slow down healing.  Try to avoid constipation and/or diarrhea as this can make the pain and bleeding worse.    * It is important to have regular bowel movements at least every other day and to keep your stool soft.  A high fiber diet, including at least four servings of fruits or vegetables daily, will help to keep your bowel movements regular and soft.    Activity:  After your procedure, there are no restrictions on your activity     except restrictions surrounding being on narcotics and in pain, such as no heavy machine operating or driving.     You may walk, climb stairs, ride in a car, and sit as tolerated.     It is helpful to avoid sitting in one position for long periods (2 or more hours).    After some surgeries, you may be told not to perform any lifting (more than 10 pounds) for several weeks after  surgery.    When to call:  When do I need to call the doctor or triage nurse?    If you experience any of the problems listed here, call our triage nurse during business hours ([phone]).     The nurse will help you with your problem or have the doctor call you.     After hours and on weekends, please call the main hospital number ([phone]) and ask for the colon and rectal surgery person on call.     Some is available to help you 24 hours a day, seven days a week.    Call for:   ? Fever greater than 101 degrees   ? Chills   ? Foul-smelling drainage   ? Nausea and vomiting   ? Diarrhea - greater than 3 water stools in 24 hours   ? Constipation - no bowel movement after 3 days   ? Severe bleeding that does not stop soon after a bowel movement   ? Problems with the incision, including increased pain, swelling, or redness      [facility]  Same-Day Surgery   Adult Discharge Orders & Instructions     For 24 hours after surgery    1. Get plenty of rest.  A responsible adult must stay with you for at least 24 hours after you leave the hospital.   2. Do not drive or use heavy equipment.  If you have weakness or tingling, don't drive or use heavy equipment until this feeling goes away.  3. Do not drink alcohol.  4. Avoid strenuous or risky activities.  Ask for help when climbing stairs.   5. You may feel lightheaded.  IF so, sit for a few minutes before standing.  Have someone help you get up.   6. If you have nausea (feel sick to your stomach): Drink only clear liquids such as apple juice, ginger ale, broth or 7-Up.  Rest may also help.  Be sure to drink enough fluids.  Move to a regular diet as you feel able.  7. You may have a slight fever. Call the doctor if your fever is over 100 F (37.7 C) (taken under the tongue) or lasts longer than 24 hours.  8. You may have a dry mouth, a sore throat, muscle aches or trouble sleeping.  These should go away after 24 hours.  9. Do not  make important or legal decisions.   Call your doctor for any of the followin.  Signs of infection (fever, growing tenderness at the surgery site, a large amount of drainage or bleeding, severe pain, foul-smelling drainage, redness, swelling).    2. It has been over 8 to 10 hours since surgery and you are still not able to urinate (pass water).    3.  Headache for over 24 hours.    To contact a doctor, call Dr. Escobar's clinic at [phone] or:        [phone] and ask for the resident on call for COLON RECTAL (answered 24 hours a day)      Emergency Department:    [facility]: [phone]       (TTY for hearing impaired: [phone])

## 2019-06-21 NOTE — ANESTHESIA PREPROCEDURE EVALUATION
Anesthesia Pre-Procedure Evaluation    Patient: Anselmo Rainey   MRN:     [id number] Gender:   male   Age:    68 year old :      1950        Preoperative Diagnosis: High Grade Dysplasia   Procedure(s):  Exam Under Anesthesia, High Resolution Anoscopy Latex Free     Past Medical History:   Diagnosis Date     Congestive heart failure, unspecified      COPD (chronic obstructive pulmonary disease) (H)      Coronary artery disease      Diabetes (H)      Gastro-oesophageal reflux disease      Heart disease      History of blood transfusion      History of rectal bleeding      Hypertension      Ileostomy in place (H)      Ischemic colitis (H)      Obese      PONV (postoperative nausea and vomiting)      Psoriasis      PTSD (post-traumatic stress disorder)      Seizures (H)      Sleep apnea     no cpap     Stented coronary artery     2 STENTS      Past Surgical History:   Procedure Laterality Date     ANOSCOPY  2014    Procedure: ANOSCOPY;  Surgeon: Sarbjit Mai MD;  [facility]     BIOPSY ANAL N/A 10/22/2014    Procedure: BIOPSY ANAL;  Surgeon: Sarbjit Mai MD;  Location: [facility]     C LAP,SURG,COLECTOMY,TOTAL,W/PROCTECTOMY       COLONOSCOPY       EXAM UNDER ANESTHESIA ANUS  2014    Procedure: EXAM UNDER ANESTHESIA ANUS;  Surgeon: Sarbjit Mai MD;  [facility]     EXAM UNDER ANESTHESIA ANUS N/A 10/22/2014    Procedure: EXAM UNDER ANESTHESIA ANUS;  Surgeon: Sarbjit Mai MD;  [facility]     EXAM UNDER ANESTHESIA ANUS N/A 2017    Procedure: EXAM UNDER ANESTHESIA ANUS;  Anal Exam with Anal Microscopy and Biopsies ;  Surgeon: Sarbjit Mai MD;  [facility]     EXAM UNDER ANESTHESIA ANUS N/A 2017    Procedure: EXAM UNDER ANESTHESIA ANUS;  Examination Under anesthesia, Anal Microscopy With Biopsies, fulguration ;  Surgeon: Sarbjit Mai MD;  [facility]     EXAM UNDER ANESTHESIA ANUS N/A 3/14/2018    Procedure: EXAM UNDER ANESTHESIA ANUS;  Anal Examination Under  Anesthesia, Anal Microscopy fulgeration of dysplasia;  Surgeon: Sarbjit Mai MD;  [facility]     EXAM UNDER ANESTHESIA RECTUM N/A 6/8/2015    Procedure: EXAM UNDER ANESTHESIA RECTUM;  Surgeon: Sarbjit Mai MD;  Location: [facility]     HC PERIANAL BIOPSY      with emergency surgery for post excision or post biopsy hemorrhage     ILEOSTOMY       ILEOSTOMY       MICROSCOPY ANAL N/A 10/22/2014    Procedure: MICROSCOPY ANAL;  Surgeon: Sarbjit Mai MD;  [facility]     MICROSCOPY ANAL N/A 6/8/2015    Procedure: MICROSCOPY ANAL;  Surgeon: Sarbjit Mai MD;  [facility]     MICROSCOPY ANAL N/A 4/26/2017    Procedure: MICROSCOPY ANAL;;  Surgeon: Sarbjit Mai MD;  [facility]     MICROSCOPY ANAL N/A 8/30/2017    Procedure: MICROSCOPY ANAL;;  Surgeon: Sarbjit Mai MD;  [facility]     MICROSCOPY ANAL N/A 3/14/2018    Procedure: MICROSCOPY ANAL;;  Surgeon: Sarbjit Mai MD;  [facility]     MICROSCOPY ANAL N/A 10/10/2018    Procedure: MICROSCOPY ANAL;  Anal Exam, Anal Microscopy, Fulgeration of Dysplagia;  Surgeon: Sarbjit Mai MD;  [facility]     SIGMOIDOSCOPY FLEXIBLE  5/28/2014    Procedure: SIGMOIDOSCOPY FLEXIBLE;  Surgeon: Sarbjit Mai MD;  Location: [facility]     STENT                     PHYSICAL EXAM:   Mental Status/Neuro: A/A/O   Airway: Facies: Challenging; Guerrero  Mallampati: IV  Mouth/Opening: Full  TM distance: > 6 cm  Neck ROM: Limited   Respiratory: Auscultation: CTAB     Resp. Rate: Normal     Resp. Effort: Normal      CV: Rhythm: Regular  Rate: Age appropriate  Heart: Normal Sounds   Comments:      Dental: Normal                  Lab Results   Component Value Date    HGB 12.6 (L) 06/21/2019    POTASSIUM 4.4 06/21/2019    CR 1.09 06/21/2019       Preop Vitals  BP Readings from Last 3 Encounters:   06/21/19 140/55   10/10/18 103/47   03/14/18 127/67    Pulse Readings from Last 3 Encounters:   06/21/19 71   10/28/16 (!) 48   04/21/14 62      Resp Readings from Last 3  "Encounters:   06/21/19 18   10/10/18 16   03/14/18 18    SpO2 Readings from Last 3 Encounters:   06/21/19 100%   10/10/18 96%   03/14/18 100%      Temp Readings from Last 1 Encounters:   06/21/19 36.4  C (97.5  F) (Oral)    Ht Readings from Last 1 Encounters:   06/21/19 1.702 m (5' 7\")      Wt Readings from Last 1 Encounters:   06/21/19 111.6 kg (246 lb 0.5 oz)    Estimated body mass index is 38.53 kg/m  as calculated from the following:    Height as of this encounter: 1.702 m (5' 7\").    Weight as of this encounter: 111.6 kg (246 lb 0.5 oz).     LDA:  Peripheral IV 06/21/19 Right Hand (Active)   Site Assessment WDL 6/21/2019 11:24 AM   Line Status Saline locked 6/21/2019 11:24 AM   Phlebitis Scale 0-->no symptoms 6/21/2019 11:24 AM   Infiltration Scale 0 6/21/2019 11:24 AM   Number of days: 0       Ileostomy (Active)   Stomal Appliance 1 piece 6/21/2019 11:35 AM   Peristomal Assessment Intact 6/21/2019 11:35 AM   Number of days: 2697            Assessment:   ASA SCORE: 3    NPO Status: > 2 hours since completed Clear Liquids; > 6 hours since completed Solid Foods   Documentation: H&P complete; Preop Testing complete; Consents complete   Proceeding: Proceed without further delay  Tobacco Use:  NO Active use of Tobacco/UNKNOWN Tobacco use status     Plan:   Anes. Type:  MAC      Induction:  IV (Standard)   Airway: Native Airway   Access/Monitoring: PIV   Maintenance: Propofol; IV   Emergence: Procedure Site   Logistics: Same Day Surgery     Postop Pain/Sedation Strategy:  Standard-Options: Opioids PRN     PONV Management:  Adult Risk Factors:, H/o PONV or Motion Sickness, Non-Smoker, Postop Opioids  Prevention: Propofol Infusion; Ondansetron     CONSENT: Direct conversation   Plan and risks discussed with: Patient; Spouse   Blood Products: Consent Deferred (Minimal Blood Loss)                         Jessica Gordillo MD  "

## 2019-06-21 NOTE — ANESTHESIA POSTPROCEDURE EVALUATION
Anesthesia POST Procedure Evaluation    Patient: Anselmo Rainey   MRN:     [id number] Gender:   male   Age:    68 year old :      1950        Preoperative Diagnosis: High Grade Dysplasia   Procedure(s):  Exam Under Anesthesia, Microscopy and fulguration of dysplagia, biopsy of right buttock lesion   Postop Comments: No value filed.       Anesthesia Type:  MAC  No value filed.    Reportable Event: NO     PAIN: Uncomplicated   Sign Out status: Comfortable, Well controlled pain     PONV: No PONV   Sign Out status:  No Nausea or Vomiting     Neuro/Psych: Uneventful perioperative course   Sign Out Status: Preoperative baseline; Age appropriate mentation     Airway/Resp.: Uneventful perioperative course   Sign Out Status: Non labored breathing, age appropriate RR; Resp. Status within EXPECTED Parameters     CV: Uneventful perioperative course   Sign Out status: Appropriate BP and perfusion indices; Appropriate HR/Rhythm     Disposition:   Sign Out in:  PACU  Disposition:  Phase II; Home  Recovery Course: Uneventful  Follow-Up: Not required           Last Anesthesia Record Vitals:  CRNA VITALS  2019 1306 - 2019 1406      2019             NIBP:  115/59    Pulse:  63    Ht Rate:  63    SpO2:  100 %          Last PACU Vitals:  Vitals Value Taken Time   BP     Temp     Pulse     Resp     SpO2     Temp src Core 2019  1:30 PM   NIBP 110/56 2019  1:39 PM   Pulse 62 2019  1:39 PM   SpO2 100 % 2019  1:39 PM   Resp     Temp     Ht Rate 62 2019  1:39 PM   Temp 2 36.2  C (97.2  F) 2019  1:32 PM         Electronically Signed By: Jessica Gordillo MD, 2019, 2:16 PM

## 2019-06-21 NOTE — PROGRESS NOTES
SPIRITUAL HEALTH SERVICES  [facility] 3C   PRE-SURGERY VISIT    Had pre-surgery visit with pt and his wife.  Provided spiritual support, prayer.     Delmis Dominguez  Chaplain Resident  Pager  [phone]

## 2019-06-22 NOTE — OP NOTE
Procedure Date: 06/21/2019      PREOPERATIVE DIAGNOSIS:  History of anal dysplasia.      POSTOPERATIVE DIAGNOSES:   1.  Anal dysplasia.   1.  Right buttock lesion.      PROCEDURE:  Examination under anesthesia with high-definition anal microscopy; fulguration of anal dysplasia; biopsy of right buttock lesion.      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  He has a history of a chronic anal ulcer, biopsies of which have been negative on several occasions.  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  He presents today for routine followup examination.  He has no new bowel concerns.  I discussed the risks and alternatives to the procedure in detail with the patient.  I answered all of his questions to his stated satisfaction.  He expressed his understanding and provided written informed consent.      SURGEON:  Sarbjit Mai MD.      ASSISTANT:  Dr. Barajas and Noe Ramírez MS4.      DESCRIPTION OF PROCEDURE:  With the patient in the left lateral decubitus position on the split leg table and the buttocks taped apart, under intravenous sedation by Anesthesia, the perianal skin was prepped and draped in a sterile fashion.  A timeout was performed and the patient and procedure confirmed.  Local infiltration of 0.5% Marcaine was utilized and a satisfactory perianal block was obtained.  The anal canal and perianal skin were soaked with 5% acetic acid and high-definition anal microscopy was performed using the colposcope.  Externally, there was a 4-5 mm punched out defect in the skin on the right buttock, roughly 4 cm right anterolateral to the anal margin.  The base of this had what looked like granulation tissue.  This did not, however, have the typical appearance of a fistula tract opening.  I curetted and excised the granulation tissue at the base of the skin defect and  sent this for permanent section.  I also excised a small margin of the skin defect to be certain that there was no occult pathology, though the entire process looked entirely benign and there was no underlying mass, fluctuance, or swelling.  I used the colposcope to perform high-definition anal microscopy.  There was a healed scar at the posterior anal margin extending into the distal anal canal.  There was a tiny patch of relatively intense acetowhitening with very fine micropunctation in the posterior midline.  This was destroyed with electrocautery.  No other anal canal lesions were noted, though the rectal mucosa was noted to be quite friable, as it had been on previous occasions.  The patient has had known ongoing proctitis in addition to his previous colitis requiring a colectomy.  With hemostasis being secure, we completed the procedure.  There was no blood loss.  Sponge, needle and instrument counts were reported as correct at the conclusion of the case x 2.        Based on the very limited findings today, I will plan a repeat anal microscopy in 1 years' time.  Given the patient's longstanding proctitis, I think it would be well to plan a flexible sigmoidoscopy of his Angela pouch at the same time.         BHANU DA SILVA MD             D: 2019   T: 2019   MT: [id number]      Name:     DEMIAN VALDEZ   MRN:      [id number]        Account:        [id number]   :      1950           Procedure Date: 2019      Document: [id number]       cc: Kehinde Radford MSN, NP-C       BRIGITTE RAMOS MD       [facility] Surgery Billing

## 2019-06-23 LAB — COPATH REPORT: NORMAL

## 2019-06-28 ENCOUNTER — PATIENT OUTREACH (OUTPATIENT)
Dept: SURGERY | Facility: CLINIC | Age: 69
End: 2019-06-28

## 2019-06-28 ENCOUNTER — TELEPHONE (OUTPATIENT)
Dept: SURGERY | Facility: CLINIC | Age: 69
End: 2019-06-28

## 2019-06-28 NOTE — PROGRESS NOTES
Patient was called to check in post-op and see how he is doing. Patient states he is doing well however he feels the nurse he saw post-op was rushing him out from his procedure and he felt like there was not much care taken with his discharge from his procedure. Patient also complains of discharge from his anus. Advised giving himself a fleet enema 1-2 times per week. Understanding stated, he will callback with further questions or concerns.

## 2019-08-01 ENCOUNTER — TRANSFERRED RECORDS (OUTPATIENT)
Dept: HEALTH INFORMATION MANAGEMENT | Facility: CLINIC | Age: 69
End: 2019-08-01

## 2019-11-06 ENCOUNTER — TRANSFERRED RECORDS (OUTPATIENT)
Dept: HEALTH INFORMATION MANAGEMENT | Facility: CLINIC | Age: 69
End: 2019-11-06

## 2020-04-03 ENCOUNTER — PREP FOR PROCEDURE (OUTPATIENT)
Dept: SURGERY | Facility: CLINIC | Age: 70
End: 2020-04-03

## 2020-04-03 DIAGNOSIS — Z87.19 HISTORY OF ANAL DYSPLASIA: Primary | ICD-10-CM

## 2020-06-19 ENCOUNTER — TELEPHONE (OUTPATIENT)
Dept: SURGERY | Facility: CLINIC | Age: 70
End: 2020-06-19

## 2020-06-19 NOTE — TELEPHONE ENCOUNTER
Patient has recently been diagnosed with Crohn's is receiving Remicade on 07/16. This writer has contacted the care team to determine time frame for scheduling. He would also like to make sure the [facility] will cover this procedure. He is aware this writer will call him with answers to his questions.

## 2020-06-26 ENCOUNTER — TELEPHONE (OUTPATIENT)
Dept: SURGERY | Facility: CLINIC | Age: 70
End: 2020-06-26

## 2020-06-26 NOTE — TELEPHONE ENCOUNTER
I spoke with pt to update him that VA nurse Symone (Phone [phone]) has been assigned to working on the VA authorization for outpatient surgery with Dr Sarbjit Mai.  Our surgery scheduler will call him as soon has I get the faxed authorization.  Sherry Rincon/Colorectal [phone]

## 2020-07-09 ENCOUNTER — TELEPHONE (OUTPATIENT)
Dept: SURGERY | Facility: CLINIC | Age: 70
End: 2020-07-09

## 2020-07-09 NOTE — TELEPHONE ENCOUNTER
I spoke with patient to let him know that I found out that a different nurse was assigned to his case due to changes in their office.  I spoke with VA nurse Miya Gonzalez phone [phone] who said the authorization should be faxed to me soon. Sherry /Colorectal Office [phone]

## 2020-07-21 ENCOUNTER — TELEPHONE (OUTPATIENT)
Dept: SURGERY | Facility: CLINIC | Age: 70
End: 2020-07-21

## 2020-07-21 NOTE — TELEPHONE ENCOUNTER
"I received a message forwarded to me from Sherry GORDON, who has been communicating with the [facility] about getting prior authorization for his procedure yet to be scheduled (will schedule as soon as PA received):    \"Thurs 7/9   I spoke with patient to let him know that I called the [facility] today and found out that a different nurse was assigned to his case due to changes in their office.  I now spoke with VA nurse Miya Gonzalez phone [phone] who said the authorization should be faxed to me soon. As soon as I receive it, I will forward to our surgery scheduler.  Sherry /Colorectal Office [phone]\"  "

## 2020-07-22 NOTE — TELEPHONE ENCOUNTER
Patient is scheduled for surgery with Dr. Sergei Seo with [facility]    Date of Surgery: 8/14/2020    Location: [place]    Informed patient they will need an adult  Yes    [facility]    COVID-19 Test scheduled at [facility] Testing location on Tues 8/11/2020 at 12:00 pm    Post-op appointments: NONE per Dr. Mai    Surgery packet: will Mail    Additional comments: [facility] patient, prior authorization received yesterday

## 2020-07-23 DIAGNOSIS — Z11.59 ENCOUNTER FOR SCREENING FOR OTHER VIRAL DISEASES: Primary | ICD-10-CM

## 2020-07-24 ENCOUNTER — DOCUMENTATION ONLY (OUTPATIENT)
Dept: SURGERY | Facility: CLINIC | Age: 70
End: 2020-07-24

## 2020-07-27 ENCOUNTER — TELEPHONE (OUTPATIENT)
Dept: SURGERY | Facility: CLINIC | Age: 70
End: 2020-07-27

## 2020-07-27 NOTE — TELEPHONE ENCOUNTER
Patient called with questions about his COVID-19 Test appointment, so I went over date, time, location, and instructions.    I also updated him that I mailed a Surgery Packet to him containing all of this information.    Patient expressed understanding and will call back if he has further questions.    He also said he'd update us once his Pre-op [facility] appointment [facility] is set up, so that we know date, time, [facility] clinic location, and so we can arrange to get the medical records for this visit.

## 2020-07-30 ENCOUNTER — TRANSFERRED RECORDS (OUTPATIENT)
Dept: HEALTH INFORMATION MANAGEMENT | Facility: CLINIC | Age: 70
End: 2020-07-30

## 2020-08-11 DIAGNOSIS — Z11.59 ENCOUNTER FOR SCREENING FOR OTHER VIRAL DISEASES: ICD-10-CM

## 2020-08-11 PROCEDURE — U0003 INFECTIOUS AGENT DETECTION BY NUCLEIC ACID (DNA OR RNA); SEVERE ACUTE RESPIRATORY SYNDROME CORONAVIRUS 2 (SARS-COV-2) (CORONAVIRUS DISEASE [COVID-19]), AMPLIFIED PROBE TECHNIQUE, MAKING USE OF HIGH THROUGHPUT TECHNOLOGIES AS DESCRIBED BY CMS-2020-01-R: HCPCS | Performed by: COLON & RECTAL SURGERY

## 2020-08-12 ENCOUNTER — TELEPHONE (OUTPATIENT)
Dept: SURGERY | Facility: CLINIC | Age: 70
End: 2020-08-12

## 2020-08-12 LAB
SARS-COV-2 RNA SPEC QL NAA+PROBE: NOT DETECTED
SPECIMEN SOURCE: NORMAL

## 2020-08-12 RX ORDER — ACETAMINOPHEN 325 MG/1
975 TABLET ORAL ONCE
Status: CANCELLED | OUTPATIENT
Start: 2020-08-12 | End: 2020-08-12

## 2020-08-12 RX ORDER — TAMSULOSIN HYDROCHLORIDE 0.4 MG/1
0.4 CAPSULE ORAL DAILY
COMMUNITY

## 2020-08-12 RX ORDER — GLIPIZIDE 10 MG/1
10 TABLET ORAL
COMMUNITY

## 2020-08-12 NOTE — TELEPHONE ENCOUNTER
Patient called with questions about what he can eat/drink before surgery. I explained this, and mentioned that it is all in his Surgery Packet, which patient confirmed that he did receive.     Patient also asked if his COVID-19 Test results were back, I checked, they are, updated patient that he tested negative for COVID-19.

## 2020-08-14 ENCOUNTER — TELEPHONE (OUTPATIENT)
Dept: SURGERY | Facility: CLINIC | Age: 70
End: 2020-08-14

## 2020-08-14 ENCOUNTER — ANESTHESIA (OUTPATIENT)
Dept: SURGERY | Facility: CLINIC | Age: 70
End: 2020-08-14
Payer: COMMERCIAL

## 2020-08-14 ENCOUNTER — ANESTHESIA EVENT (OUTPATIENT)
Dept: SURGERY | Facility: CLINIC | Age: 70
End: 2020-08-14
Payer: COMMERCIAL

## 2020-08-14 ENCOUNTER — HOSPITAL ENCOUNTER (OUTPATIENT)
Facility: CLINIC | Age: 70
Discharge: HOME | End: 2020-08-14
Attending: COLON & RECTAL SURGERY | Admitting: COLON & RECTAL SURGERY
Payer: COMMERCIAL

## 2020-08-14 DIAGNOSIS — Z87.19 HISTORY OF ANAL DYSPLASIA: ICD-10-CM

## 2020-08-14 DIAGNOSIS — Z11.59 ENCOUNTER FOR SCREENING FOR OTHER VIRAL DISEASES: Primary | ICD-10-CM

## 2020-08-14 NOTE — TELEPHONE ENCOUNTER
Received phone call from OR Manager Florina BEY, saying that patient's surgery needs to be cancelled/rescheduled from today to a different day due to urgent cases in the OR today.    Called patient and emergency contact, left urgent message for patient to call me back as soon as possible because his surgery must be rescheduled due to urgent cases in the OR today.    Sent text to Dr Mai about this, and left a voicemail message for OR Mgr Florina.    OR  Oh was to be told to rescheduled to next Friday 8/21/2020 at 2:10 pm. I will update patient of this when he calls, and he will need a new COVID-19 Test.    Patient showed up, was told his surgery had to be cancelled. Patient called, new scheduling completed.    Patient is scheduled for surgery with Dr. Mai    Spoke     Date of Surgery: 9/2/2020    Location: [place]    Informed patient they will need an adult  YES    H&P: [facility], so patient needs an updated H&P. Patient will call the [facility] to schedule this on a date before his surgery date.    COVID-19 Test: to be scheduled 3-4 days before surgery or if patient cannot do it on the weekend, then on Monday (48 hrs before surgery), at [facility] in [place] as soon as their schedule template is available    Surgery packet: will mail    Additional comments:   -rescheduled to a morning surgery start time due to patient states afternoon is too difficult with his diabetes (fasting that long is too difficult)

## 2020-08-21 ENCOUNTER — TEAM CONFERENCE (OUTPATIENT)
Dept: SURGERY | Facility: CLINIC | Age: 70
End: 2020-08-21

## 2020-08-21 NOTE — PROGRESS NOTES
COLON AND RECTAL SURGERY HUDDLE:    Patient was reviewed in preporation for their surgery the following was reviewed and has been completed:    Surgeon: Dr. Sarbjit Mai    Surgery & Date: 9/2 EXAM UNDER ANESTHESIA, Microscopy and fulguration of dysplagia, flex sig    Last MD Note: reviewed    Anesthesia Type: MAC    Other Providers: No    PAC: Yes---PCP    WOC: N/A    Labs: N/A    Bowel Prep: Yes Fleet Enema    Packet: Yes    Imaging: N/A    Post-Op Appointments: N/A    COVID: ordered but not scheduled--addendum- 8/26/2020= DONE

## 2020-08-24 ENCOUNTER — TELEPHONE (OUTPATIENT)
Dept: SURGERY | Facility: CLINIC | Age: 70
End: 2020-08-24

## 2020-08-24 NOTE — TELEPHONE ENCOUNTER
Patient is scheduled for surgery with Dr. Mai    Spoke with [facility]    Date of Surgery: 9/2/2020    Location: [place]    Informed patient they will need an adult  Yes    [facility]: [facility]    COVID-19 Test scheduled at [facility] on 8/31/2020 at 10:00 am    Surgery packet: mailed (no [place] access)     Additional Information:  - [facility] had no weekend appointments; neither did the [facility] (for asymptomatic patients)

## 2020-08-31 DIAGNOSIS — Z11.59 ENCOUNTER FOR SCREENING FOR OTHER VIRAL DISEASES: ICD-10-CM

## 2020-08-31 LAB
SARS-COV-2 RNA SPEC QL NAA+PROBE: NORMAL
SPECIMEN SOURCE: NORMAL

## 2020-08-31 PROCEDURE — U0003 INFECTIOUS AGENT DETECTION BY NUCLEIC ACID (DNA OR RNA); SEVERE ACUTE RESPIRATORY SYNDROME CORONAVIRUS 2 (SARS-COV-2) (CORONAVIRUS DISEASE [COVID-19]), AMPLIFIED PROBE TECHNIQUE, MAKING USE OF HIGH THROUGHPUT TECHNOLOGIES AS DESCRIBED BY CMS-2020-01-R: HCPCS | Performed by: FAMILY MEDICINE

## 2020-09-01 LAB
LABORATORY COMMENT REPORT: NORMAL
SARS-COV-2 RNA SPEC QL NAA+PROBE: NEGATIVE
SPECIMEN SOURCE: NORMAL

## 2020-09-01 ASSESSMENT — COPD QUESTIONNAIRES: COPD: 1

## 2020-09-01 NOTE — ANESTHESIA PREPROCEDURE EVALUATION
Anesthesia Pre-Procedure Evaluation    Patient: Anselmo Rainey   MRN:     [id number] Gender:   male   Age:    68 year old :      1950        Preoperative Diagnosis: High Grade Dysplasia   Procedure(s):  Exam Under Anesthesia, High Resolution Anoscopy Latex Free     Past Medical History:   Diagnosis Date     Congestive heart failure, unspecified      COPD (chronic obstructive pulmonary disease) (H)      Coronary artery disease      Diabetes (H)      Gastro-oesophageal reflux disease      Heart disease      History of blood transfusion      History of rectal bleeding      Hypertension      Ileostomy in place (H)      Ischemic colitis (H)      Obese      Pacemaker      PONV (postoperative nausea and vomiting)      Psoriasis      PTSD (post-traumatic stress disorder)      Seizures (H)      Sleep apnea     no cpap     Stented coronary artery     2 STENTS      Past Surgical History:   Procedure Laterality Date     ANOSCOPY  2014    Procedure: ANOSCOPY;  Surgeon: Sarbjit Mai MD;  [facility]     ANOSCOPY Right 2019    Procedure: Exam Under Anesthesia, Microscopy and fulguration of dysplagia, biopsy of right buttock lesion;  Surgeon: Sarbjit Mai MD;  [facility]     BIOPSY ANAL N/A 10/22/2014    Procedure: BIOPSY ANAL;  Surgeon: Sarbjit Mai MD;  [facility]     C LAP,SURG,COLECTOMY,TOTAL,W/PROCTECTOMY       COLONOSCOPY       EXAM UNDER ANESTHESIA ANUS  2014    Procedure: EXAM UNDER ANESTHESIA ANUS;  Surgeon: Sarbjit Mai MD;  [facility]     EXAM UNDER ANESTHESIA ANUS N/A 10/22/2014    Procedure: EXAM UNDER ANESTHESIA ANUS;  Surgeon: Sarbjit Mai MD;  [facility]     EXAM UNDER ANESTHESIA ANUS N/A 2017    Procedure: EXAM UNDER ANESTHESIA ANUS;  Anal Exam with Anal Microscopy and Biopsies ;  Surgeon: Sarbjit Mai MD;  [facility]     EXAM UNDER ANESTHESIA ANUS N/A 2017    Procedure: EXAM UNDER ANESTHESIA ANUS;  Examination Under anesthesia,  Anal Microscopy With Biopsies, fulguration ;  Surgeon: Sarbjit Mai MD;  [facility]     EXAM UNDER ANESTHESIA ANUS N/A 3/14/2018    Procedure: EXAM UNDER ANESTHESIA ANUS;  Anal Examination Under Anesthesia, Anal Microscopy fulgeration of dysplasia;  Surgeon: Sarbjit Mai MD;  [facility]     EXAM UNDER ANESTHESIA RECTUM N/A 6/8/2015    Procedure: EXAM UNDER ANESTHESIA RECTUM;  Surgeon: Sarbjit Mai MD;  [facility]     HC PERIANAL BIOPSY      with emergency surgery for post excision or post biopsy hemorrhage     ILEOSTOMY       ILEOSTOMY       MICROSCOPY ANAL N/A 10/22/2014    Procedure: MICROSCOPY ANAL;  Surgeon: Sarbjit Mai MD;  [facility]     MICROSCOPY ANAL N/A 6/8/2015    Procedure: MICROSCOPY ANAL;  Surgeon: Sarbjit Mai MD;  [facility]     MICROSCOPY ANAL N/A 4/26/2017    Procedure: MICROSCOPY ANAL;;  Surgeon: Sarbjit Mai MD;  [facility]     MICROSCOPY ANAL N/A 8/30/2017    Procedure: MICROSCOPY ANAL;;  Surgeon: Sarbjit Mai MD;  [facility]     MICROSCOPY ANAL N/A 3/14/2018    Procedure: MICROSCOPY ANAL;;  Surgeon: Sarbjit Mai MD;  [facility]     MICROSCOPY ANAL N/A 10/10/2018    Procedure: MICROSCOPY ANAL;  Anal Exam, Anal Microscopy, Fulgeration of Dysplagia;  Surgeon: Sarbjit Mai MD;  [facility]     SIGMOIDOSCOPY FLEXIBLE  5/28/2014    Procedure: SIGMOIDOSCOPY FLEXIBLE;  Surgeon: Sarbjit Mai MD;  [facility]     STENT                     PHYSICAL EXAM:   Mental Status/Neuro: A/A/O   Airway: Facies: Challenging; Gu[place]ro  Mallampati: IV  Mouth/Opening: Full  TM distance: > 6 cm  Neck ROM: Limited   Respiratory: Auscultation: CTAB     Resp. Rate: Normal     Resp. Effort: Normal      CV: Rhythm: Regular  Rate: Age appropriate  Heart: Normal Sounds   Comments:      Dental: Normal                  Lab Results   Component Value Date    HGB 12.6 (L) 06/21/2019    POTASSIUM 4.1 09/02/2020    CR 1.29 (H) 09/02/2020       Preop Vitals  BP  "Readings from Last 3 Encounters:   20 130/73   19 117/70   10/10/18 103/47    Pulse Readings from Last 3 Encounters:   20 73   19 71   10/28/16 (!) 48      Resp Readings from Last 3 Encounters:   20 15   19 17   10/10/18 16    SpO2 Readings from Last 3 Encounters:   20 98%   19 100%   10/10/18 96%      Temp Readings from Last 1 Encounters:   20 36.7  C (98.1  F) (Oral)    Ht Readings from Last 1 Encounters:   20 1.702 m (5' 7\")      Wt Readings from Last 1 Encounters:   20 126.3 kg (278 lb 7.1 oz)    Estimated body mass index is 43.61 kg/m  as calculated from the following:    Height as of an earlier encounter on 20: 1.702 m (5' 7\").    Weight as of an earlier encounter on 20: 126.3 kg (278 lb 7.1 oz).     LDA:  Ileostomy (Active)   Number of days: 3136            Assessment:   ASA SCORE: 3    NPO Status: > 2 hours since completed Clear Liquids; > 6 hours since completed Solid Foods   Documentation: H&P complete; Preop Testing complete; Consents complete   Proceeding: Proceed without further delay  Tobacco Use:  NO Active use of Tobacco/UNKNOWN Tobacco use status     Plan:   Anes. Type:  MAC      Induction:  IV (Standard)   Airway: Native Airway   Access/Monitoring: PIV   Maintenance: Propofol; IV   Emergence: Procedure Site   Logistics: Same Day Surgery     Postop Pain/Sedation Strategy:  Standard-Options: Opioids PRN     PONV Management:  Adult Risk Factors:, H/o PONV or Motion Sickness, Non-Smoker, Postop Opioids  Prevention: Propofol Infusion; Ondansetron     CONSENT: Direct conversation   Plan and risks discussed with: Patient; Spouse   Blood Products: Consent Deferred (Minimal Blood Loss)                         GRIS Fonsecanesthesia Pre-Procedure Evaluation    Patient: Anselmo Rainey   MRN:     [id number] Gender:   male   Age:    70 year old :      1950        Preoperative Diagnosis: 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] " "  Procedure(s):  EXAM UNDER ANESTHESIA, Microscopy and fulguration of dysplagia  SIGMOIDOSCOPY, FLEXIBLE     LABS:  CBC:   Lab Results   Component Value Date    HGB 12.6 (L) 06/21/2019     BMP:   Lab Results   Component Value Date    POTASSIUM 4.4 06/21/2019    POTASSIUM 4.0 03/14/2018    CR 1.09 06/21/2019     COAGS: No results found for: PTT, INR, FIBR  POC:   Lab Results   Component Value Date     (H) 06/21/2019     OTHER: No results found for: PH, LACT, A1C, SHIMON, PHOS, MAG, ALBUMIN, PROTTOTAL, ALT, AST, GGT, ALKPHOS, BILITOTAL, BILIDIRECT, LIPASE, AMYLASE, SWATI, TSH, T4, T3, CRP, SED     Preop Vitals    BP Readings from Last 3 Encounters:   06/21/19 117/70   10/10/18 103/47   03/14/18 127/67    Pulse Readings from Last 3 Encounters:   06/21/19 71   10/28/16 (!) 48   04/21/14 62      Resp Readings from Last 3 Encounters:   06/21/19 17   10/10/18 16   03/14/18 18    SpO2 Readings from Last 3 Encounters:   06/21/19 100%   10/10/18 96%   03/14/18 100%      Temp Readings from Last 1 Encounters:   06/21/19 36.6  C (97.8  F) (Oral)    Ht Readings from Last 1 Encounters:   06/21/19 1.702 m (5' 7\")      Wt Readings from Last 1 Encounters:   06/21/19 111.6 kg (246 lb 0.5 oz)    Estimated body mass index is 38.53 kg/m  as calculated from the following:    Height as of 6/21/19: 1.702 m (5' 7\").    Weight as of 6/21/19: 111.6 kg (246 lb 0.5 oz).     LDA:  Ileostomy (Active)   Number of days: 3135        Past Medical History:   Diagnosis Date     Congestive heart failure, unspecified      COPD (chronic obstructive pulmonary disease) (H)      Coronary artery disease      Diabetes (H)      Gastro-oesophageal reflux disease      Heart disease      History of blood transfusion      History of rectal bleeding      Hypertension      Ileostomy in place (H)      Ischemic colitis (H)      Obese      Pacemaker      PONV (postoperative nausea and vomiting)      Psoriasis      PTSD (post-traumatic stress disorder)      Seizures " (H)      Sleep apnea     no cpap     Stented coronary artery 2011    2 STENTS      Past Surgical History:   Procedure Laterality Date     ANOSCOPY  5/28/2014    Procedure: ANOSCOPY;  Surgeon: Sarbjit Mai MD;  [facility]     ANOSCOPY Right 6/21/2019    Procedure: Exam Under Anesthesia, Microscopy and fulguration of dysplagia, biopsy of right buttock lesion;  Surgeon: Sarbjit Mai MD;  [facility]     BIOPSY ANAL N/A 10/22/2014    Procedure: BIOPSY ANAL;  Surgeon: Sarbjit Mai MD;  [facility]     C LAP,SURG,COLECTOMY,TOTAL,W/PROCTECTOMY       COLONOSCOPY       EXAM UNDER ANESTHESIA ANUS  5/28/2014    Procedure: EXAM UNDER ANESTHESIA ANUS;  Surgeon: Sarbjit Mai MD;  [facility]     EXAM UNDER ANESTHESIA ANUS N/A 10/22/2014    Procedure: EXAM UNDER ANESTHESIA ANUS;  Surgeon: Sarbjit Mai MD;  [facility]     EXAM UNDER ANESTHESIA ANUS N/A 4/26/2017    Procedure: EXAM UNDER ANESTHESIA ANUS;  Anal Exam with Anal Microscopy and Biopsies ;  Surgeon: Sarbjit Mai MD;  [facility]     EXAM UNDER ANESTHESIA ANUS N/A 8/30/2017    Procedure: EXAM UNDER ANESTHESIA ANUS;  Examination Under anesthesia, Anal Microscopy With Biopsies, fulguration ;  Surgeon: Sarbjit Mai MD;  [facility]     EXAM UNDER ANESTHESIA ANUS N/A 3/14/2018    Procedure: EXAM UNDER ANESTHESIA ANUS;  Anal Examination Under Anesthesia, Anal Microscopy fulgeration of dysplasia;  Surgeon: Sarbjit Mai MD;  [facility]     EXAM UNDER ANESTHESIA RECTUM N/A 6/8/2015    Procedure: EXAM UNDER ANESTHESIA RECTUM;  Surgeon: Sarbjit Mai MD;  [facility]     HC PERIANAL BIOPSY      with emergency surgery for post excision or post biopsy hemorrhage     ILEOSTOMY       ILEOSTOMY       MICROSCOPY ANAL N/A 10/22/2014    Procedure: MICROSCOPY ANAL;  Surgeon: Sarbjit Mai MD;  [facility]     MICROSCOPY ANAL N/A 6/8/2015    Procedure: MICROSCOPY ANAL;  Surgeon: Sarbjit Mai MD;  [facility]      MICROSCOPY ANAL N/A 4/26/2017    Procedure: MICROSCOPY ANAL;;  Surgeon: Sarbjit Mai MD;  [facility]     MICROSCOPY ANAL N/A 8/30/2017    Procedure: MICROSCOPY ANAL;;  Surgeon: Sarbjit Mai MD;  [facility]     MICROSCOPY ANAL N/A 3/14/2018    Procedure: MICROSCOPY ANAL;;  Surgeon: Sarbjit Mai MD;  [facility]     MICROSCOPY ANAL N/A 10/10/2018    Procedure: MICROSCOPY ANAL;  Anal Exam, Anal Microscopy, Fulgeration of Dysplagia;  Surgeon: Sarbjit Mai MD;  [facility]     SIGMOIDOSCOPY FLEXIBLE  5/28/2014    Procedure: SIGMOIDOSCOPY FLEXIBLE;  Surgeon: Sarbjit Mai MD;  [facility]     STENT        Allergies   Allergen Reactions     Cephalexin Difficulty breathing     Gemfibrozil Other (See Comments)     unknown     Levofloxacin Difficulty breathing     Throat tightens and he gets a spotty rash     Lisinopril Cough        Anesthesia Evaluation     .             ROS/MED HX    ENT/Pulmonary:     (+)sleep apnea, COPD, , . .    Neurologic:  - neg neurologic ROS     Cardiovascular:     (+) hypertension--CAD, --. : . CHF etiology: LVHF . . pacemaker Reason placed: Complete heart block:. . Previous cardiac testing date:results:date: results:ECG reviewed date: results: date: results:          METS/Exercise Tolerance:     Hematologic:  - neg hematologic  ROS       Musculoskeletal:  - neg musculoskeletal ROS       GI/Hepatic:     (+) GERD       Renal/Genitourinary:     (+) chronic renal disease, type: CRI,       Endo:     (+) type II DM Using insulin Obesity, .      Psychiatric:     (+) psychiatric history other (comment) (PTSD)      Infectious Disease:  - neg infectious disease ROS       Malignancy:      - no malignancy   Other:                         PHYSICAL EXAM:   Mental Status/Neuro: A/A/O   Airway: Facies: Feasible  Mallampati: I  Mouth/Opening: Full  TM distance: > 6 cm  Neck ROM: Full   Respiratory:   Resp. Rate: Normal     Resp. Effort: Normal      CV: Rhythm: Regular  Rate: Age  appropriate  Edema: None   Comments:      Dental: Normal Dentition                Assessment:   ASA SCORE: 3    H&P: History and physical reviewed and following examination; no interval change.   Smoking Status:  Non-Smoker/Unknown   NPO Status: NPO Appropriate     Plan:   Anes. Type:  MAC   Pre-Medication: None   Induction:  N/a   Airway: Native Airway   Access/Monitoring: PIV   Maintenance: N/a     Postop Plan:   Postop Pain: None  Postop Sedation/Airway: Not planned     PONV Management:   Adult Risk Factors:, H/o PONV or Motion Sickness, Non-Smoker   Prevention: Ondansetron     CONSENT: Direct conversation   Plan and risks discussed with: Patient   Blood Products: N/a                   Quincy Salvador MD

## 2020-09-02 ENCOUNTER — HOSPITAL ENCOUNTER (OUTPATIENT)
Facility: CLINIC | Age: 70
Discharge: HOME | End: 2020-09-02
Attending: COLON & RECTAL SURGERY | Admitting: COLON & RECTAL SURGERY
Payer: COMMERCIAL

## 2020-09-02 ENCOUNTER — ANCILLARY PROCEDURE (OUTPATIENT)
Dept: CARDIOLOGY | Facility: CLINIC | Age: 70
End: 2020-09-02
Attending: INTERNAL MEDICINE
Payer: MEDICARE

## 2020-09-02 VITALS
SYSTOLIC BLOOD PRESSURE: 130 MMHG | HEART RATE: 77 BPM | WEIGHT: 278.44 LBS | HEIGHT: 67 IN | TEMPERATURE: 97.5 F | BODY MASS INDEX: 43.7 KG/M2 | DIASTOLIC BLOOD PRESSURE: 65 MMHG | OXYGEN SATURATION: 100 % | RESPIRATION RATE: 12 BRPM

## 2020-09-02 DIAGNOSIS — Z95.0 CARDIAC RESYNCHRONIZATION THERAPY PACEMAKER (CRT-P) IN PLACE: ICD-10-CM

## 2020-09-02 DIAGNOSIS — I45.2 RIGHT BUNDLE BRANCH BLOCK (RBBB) WITH LEFT ANTERIOR FASCICULAR BLOCK: ICD-10-CM

## 2020-09-02 DIAGNOSIS — Z87.19 HISTORY OF ANAL DYSPLASIA: ICD-10-CM

## 2020-09-02 DIAGNOSIS — I51.7 CARDIOMEGALY: ICD-10-CM

## 2020-09-02 DIAGNOSIS — I51.7 CARDIOMEGALY: Primary | ICD-10-CM

## 2020-09-02 LAB
CREAT SERPL-MCNC: 1.29 MG/DL (ref 0.66–1.25)
GFR SERPL CREATININE-BSD FRML MDRD: 56 ML/MIN/{1.73_M2}
GLUCOSE BLDC GLUCOMTR-MCNC: 177 MG/DL (ref 70–99)
GLUCOSE BLDC GLUCOMTR-MCNC: 223 MG/DL (ref 70–99)
POTASSIUM SERPL-SCNC: 4.1 MMOL/L (ref 3.4–5.3)

## 2020-09-02 PROCEDURE — 93286 PERI-PX EVAL PM/LDLS PM IP: CPT | Mod: 26 | Performed by: INTERNAL MEDICINE

## 2020-09-02 PROCEDURE — 40000170 ZZH STATISTIC PRE-PROCEDURE ASSESSMENT II: Performed by: COLON & RECTAL SURGERY

## 2020-09-02 PROCEDURE — 93286 PERI-PX EVAL PM/LDLS PM IP: CPT

## 2020-09-02 PROCEDURE — 25000132 ZZH RX MED GY IP 250 OP 250 PS 637: Performed by: COLON & RECTAL SURGERY

## 2020-09-02 PROCEDURE — 25800030 ZZH RX IP 258 OP 636: Performed by: STUDENT IN AN ORGANIZED HEALTH CARE EDUCATION/TRAINING PROGRAM

## 2020-09-02 PROCEDURE — 25000128 H RX IP 250 OP 636: Performed by: STUDENT IN AN ORGANIZED HEALTH CARE EDUCATION/TRAINING PROGRAM

## 2020-09-02 PROCEDURE — 82962 GLUCOSE BLOOD TEST: CPT

## 2020-09-02 PROCEDURE — 82565 ASSAY OF CREATININE: CPT | Performed by: ANESTHESIOLOGY

## 2020-09-02 PROCEDURE — 36415 COLL VENOUS BLD VENIPUNCTURE: CPT | Performed by: ANESTHESIOLOGY

## 2020-09-02 PROCEDURE — 25000128 H RX IP 250 OP 636: Performed by: ANESTHESIOLOGY

## 2020-09-02 PROCEDURE — 25000125 ZZHC RX 250: Performed by: COLON & RECTAL SURGERY

## 2020-09-02 PROCEDURE — 37000009 ZZH ANESTHESIA TECHNICAL FEE, EACH ADDTL 15 MIN: Performed by: COLON & RECTAL SURGERY

## 2020-09-02 PROCEDURE — 27210794 ZZH OR GENERAL SUPPLY STERILE: Performed by: COLON & RECTAL SURGERY

## 2020-09-02 PROCEDURE — 84132 ASSAY OF SERUM POTASSIUM: CPT | Performed by: ANESTHESIOLOGY

## 2020-09-02 PROCEDURE — 36000051 ZZH SURGERY LEVEL 2 1ST 30 MIN - UMMC: Performed by: COLON & RECTAL SURGERY

## 2020-09-02 PROCEDURE — 37000008 ZZH ANESTHESIA TECHNICAL FEE, 1ST 30 MIN: Performed by: COLON & RECTAL SURGERY

## 2020-09-02 PROCEDURE — 71000027 ZZH RECOVERY PHASE 2 EACH 15 MINS: Performed by: COLON & RECTAL SURGERY

## 2020-09-02 PROCEDURE — 36000053 ZZH SURGERY LEVEL 2 EA 15 ADDTL MIN - UMMC: Performed by: COLON & RECTAL SURGERY

## 2020-09-02 PROCEDURE — 25000125 ZZHC RX 250: Performed by: STUDENT IN AN ORGANIZED HEALTH CARE EDUCATION/TRAINING PROGRAM

## 2020-09-02 PROCEDURE — 88305 TISSUE EXAM BY PATHOLOGIST: CPT | Performed by: COLON & RECTAL SURGERY

## 2020-09-02 RX ORDER — SODIUM CHLORIDE, SODIUM LACTATE, POTASSIUM CHLORIDE, CALCIUM CHLORIDE 600; 310; 30; 20 MG/100ML; MG/100ML; MG/100ML; MG/100ML
INJECTION, SOLUTION INTRAVENOUS CONTINUOUS
Status: DISCONTINUED | OUTPATIENT
Start: 2020-09-02 | End: 2020-09-06 | Stop reason: HOSPADM

## 2020-09-02 RX ORDER — OXYCODONE HYDROCHLORIDE 5 MG/1
5-10 TABLET ORAL EVERY 6 HOURS PRN
Qty: 12 TABLET | Refills: 0 | Status: SHIPPED | OUTPATIENT
Start: 2020-09-02

## 2020-09-02 RX ORDER — EPHEDRINE SULFATE 50 MG/ML
INJECTION, SOLUTION INTRAMUSCULAR; INTRAVENOUS; SUBCUTANEOUS PRN
Status: DISCONTINUED | OUTPATIENT
Start: 2020-09-02 | End: 2020-09-02

## 2020-09-02 RX ORDER — HYDROMORPHONE HYDROCHLORIDE 1 MG/ML
.3-.5 INJECTION, SOLUTION INTRAMUSCULAR; INTRAVENOUS; SUBCUTANEOUS EVERY 5 MIN PRN
Status: DISCONTINUED | OUTPATIENT
Start: 2020-09-02 | End: 2020-09-06 | Stop reason: HOSPADM

## 2020-09-02 RX ORDER — NALOXONE HYDROCHLORIDE 0.4 MG/ML
.1-.4 INJECTION, SOLUTION INTRAMUSCULAR; INTRAVENOUS; SUBCUTANEOUS
Status: DISCONTINUED | OUTPATIENT
Start: 2020-09-02 | End: 2020-09-03 | Stop reason: HOSPADM

## 2020-09-02 RX ORDER — ACETIC ACID 5 %
LIQUID (ML) MISCELLANEOUS PRN
Status: DISCONTINUED | OUTPATIENT
Start: 2020-09-02 | End: 2020-09-02 | Stop reason: HOSPADM

## 2020-09-02 RX ORDER — ONDANSETRON 2 MG/ML
4 INJECTION INTRAMUSCULAR; INTRAVENOUS EVERY 30 MIN PRN
Status: DISCONTINUED | OUTPATIENT
Start: 2020-09-02 | End: 2020-09-06 | Stop reason: HOSPADM

## 2020-09-02 RX ORDER — FENTANYL CITRATE 50 UG/ML
25-50 INJECTION, SOLUTION INTRAMUSCULAR; INTRAVENOUS
Status: DISCONTINUED | OUTPATIENT
Start: 2020-09-02 | End: 2020-09-06 | Stop reason: HOSPADM

## 2020-09-02 RX ORDER — FENTANYL CITRATE 50 UG/ML
INJECTION, SOLUTION INTRAMUSCULAR; INTRAVENOUS PRN
Status: DISCONTINUED | OUTPATIENT
Start: 2020-09-02 | End: 2020-09-02

## 2020-09-02 RX ORDER — ONDANSETRON 2 MG/ML
INJECTION INTRAMUSCULAR; INTRAVENOUS PRN
Status: DISCONTINUED | OUTPATIENT
Start: 2020-09-02 | End: 2020-09-02

## 2020-09-02 RX ORDER — SODIUM CHLORIDE, SODIUM LACTATE, POTASSIUM CHLORIDE, CALCIUM CHLORIDE 600; 310; 30; 20 MG/100ML; MG/100ML; MG/100ML; MG/100ML
INJECTION, SOLUTION INTRAVENOUS CONTINUOUS
Status: DISCONTINUED | OUTPATIENT
Start: 2020-09-02 | End: 2020-09-02 | Stop reason: HOSPADM

## 2020-09-02 RX ORDER — LIDOCAINE 40 MG/G
CREAM TOPICAL
Status: DISCONTINUED | OUTPATIENT
Start: 2020-09-02 | End: 2020-09-02 | Stop reason: HOSPADM

## 2020-09-02 RX ORDER — SODIUM CHLORIDE, SODIUM LACTATE, POTASSIUM CHLORIDE, CALCIUM CHLORIDE 600; 310; 30; 20 MG/100ML; MG/100ML; MG/100ML; MG/100ML
INJECTION, SOLUTION INTRAVENOUS CONTINUOUS PRN
Status: DISCONTINUED | OUTPATIENT
Start: 2020-09-02 | End: 2020-09-02

## 2020-09-02 RX ORDER — PROPOFOL 10 MG/ML
INJECTION, EMULSION INTRAVENOUS PRN
Status: DISCONTINUED | OUTPATIENT
Start: 2020-09-02 | End: 2020-09-02

## 2020-09-02 RX ORDER — ONDANSETRON 4 MG/1
4 TABLET, ORALLY DISINTEGRATING ORAL EVERY 30 MIN PRN
Status: DISCONTINUED | OUTPATIENT
Start: 2020-09-02 | End: 2020-09-06 | Stop reason: HOSPADM

## 2020-09-02 RX ORDER — BUPIVACAINE HYDROCHLORIDE AND EPINEPHRINE 2.5; 5 MG/ML; UG/ML
INJECTION, SOLUTION EPIDURAL; INFILTRATION; INTRACAUDAL; PERINEURAL PRN
Status: DISCONTINUED | OUTPATIENT
Start: 2020-09-02 | End: 2020-09-02 | Stop reason: HOSPADM

## 2020-09-02 RX ORDER — PROPOFOL 10 MG/ML
INJECTION, EMULSION INTRAVENOUS CONTINUOUS PRN
Status: DISCONTINUED | OUTPATIENT
Start: 2020-09-02 | End: 2020-09-02

## 2020-09-02 RX ORDER — ACETAMINOPHEN 325 MG/1
975 TABLET ORAL ONCE
Status: COMPLETED | OUTPATIENT
Start: 2020-09-02 | End: 2020-09-02

## 2020-09-02 RX ORDER — LIDOCAINE HYDROCHLORIDE 20 MG/ML
INJECTION, SOLUTION INFILTRATION; PERINEURAL PRN
Status: DISCONTINUED | OUTPATIENT
Start: 2020-09-02 | End: 2020-09-02

## 2020-09-02 RX ADMIN — FENTANYL CITRATE 50 MCG: 50 INJECTION, SOLUTION INTRAMUSCULAR; INTRAVENOUS at 10:02

## 2020-09-02 RX ADMIN — Medication 5 MG: at 10:36

## 2020-09-02 RX ADMIN — PROPOFOL 125 MCG/KG/MIN: 10 INJECTION, EMULSION INTRAVENOUS at 10:02

## 2020-09-02 RX ADMIN — PHENYLEPHRINE HYDROCHLORIDE 100 MCG: 10 INJECTION INTRAVENOUS at 10:35

## 2020-09-02 RX ADMIN — PHENYLEPHRINE HYDROCHLORIDE 200 MCG: 10 INJECTION INTRAVENOUS at 10:51

## 2020-09-02 RX ADMIN — HYDROMORPHONE HYDROCHLORIDE 0.3 MG: 1 INJECTION, SOLUTION INTRAMUSCULAR; INTRAVENOUS; SUBCUTANEOUS at 12:08

## 2020-09-02 RX ADMIN — ACETAMINOPHEN 975 MG: 325 TABLET, FILM COATED ORAL at 09:29

## 2020-09-02 RX ADMIN — PHENYLEPHRINE HYDROCHLORIDE 100 MCG: 10 INJECTION INTRAVENOUS at 10:18

## 2020-09-02 RX ADMIN — PHENYLEPHRINE HYDROCHLORIDE 100 MCG: 10 INJECTION INTRAVENOUS at 10:39

## 2020-09-02 RX ADMIN — PROPOFOL 40 MG: 10 INJECTION, EMULSION INTRAVENOUS at 10:02

## 2020-09-02 RX ADMIN — PHENYLEPHRINE HYDROCHLORIDE 100 MCG: 10 INJECTION INTRAVENOUS at 10:28

## 2020-09-02 RX ADMIN — SODIUM CHLORIDE, POTASSIUM CHLORIDE, SODIUM LACTATE AND CALCIUM CHLORIDE: 600; 310; 30; 20 INJECTION, SOLUTION INTRAVENOUS at 09:49

## 2020-09-02 RX ADMIN — PHENYLEPHRINE HYDROCHLORIDE 100 MCG: 10 INJECTION INTRAVENOUS at 11:01

## 2020-09-02 RX ADMIN — PROPOFOL 30 MG: 10 INJECTION, EMULSION INTRAVENOUS at 11:03

## 2020-09-02 RX ADMIN — LIDOCAINE HYDROCHLORIDE 100 MG: 20 INJECTION, SOLUTION INFILTRATION; PERINEURAL at 10:02

## 2020-09-02 RX ADMIN — PHENYLEPHRINE HYDROCHLORIDE 100 MCG: 10 INJECTION INTRAVENOUS at 10:13

## 2020-09-02 RX ADMIN — PHENYLEPHRINE HYDROCHLORIDE 200 MCG: 10 INJECTION INTRAVENOUS at 10:56

## 2020-09-02 RX ADMIN — PHENYLEPHRINE HYDROCHLORIDE 200 MCG: 10 INJECTION INTRAVENOUS at 11:05

## 2020-09-02 RX ADMIN — PHENYLEPHRINE HYDROCHLORIDE 100 MCG: 10 INJECTION INTRAVENOUS at 10:45

## 2020-09-02 RX ADMIN — ONDANSETRON 4 MG: 2 INJECTION INTRAMUSCULAR; INTRAVENOUS at 11:05

## 2020-09-02 ASSESSMENT — MIFFLIN-ST. JEOR: SCORE: 1981.63

## 2020-09-02 NOTE — OR NURSING
Patient arrived to 3C hypotensive and in pain. GRIS Toussaint called to bedside. VORB given to give 500cc fluid. PACU order set placed.     MD Madoff to bedside, patient can resume aspirin tomorrow.     Handoff given to Elvis Livingston RN.

## 2020-09-02 NOTE — BRIEF OP NOTE
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]  Post-operative diagnosis Same as pre-operative diagnosis    Procedure: Procedure(s):  EXAM UNDER ANESTHESIA with debridement and biopsy of post-anal ulcer, high definition anal Microscopy with biopsy and fulguration of dysplagia  SIGMOIDOSCOPY, FLEXIBLE with biopsies  Surgeon: Surgeon(s) and Role:     * Sarbjit Mai MD - Primary     * Pema Leyva MD - Resident - Assisting     * Tom Banuelos MD - Fellow - Assisting  Anesthesia: Monitor Anesthesia Care   Estimated blood loss: 50mL  Drains: None  Specimens:   ID Type Source Tests Collected by Time Destination   A : rectum and sigmoid- random biopsies Tissue Other SURGICAL PATHOLOGY EXAM Sarbjit Mai MD 9/2/2020 10:14 AM    B : anal ulcer Tissue Other SURGICAL PATHOLOGY EXAM Sarbjit Mai MD 9/2/2020 10:36 AM    C : right lateral anal canal Tissue Other SURGICAL PATHOLOGY EXAM Sarbjit Mai MD 9/2/2020 11:02 AM      Findings:   Posterior Anal Ulcer biopsied and debrided measuring 2 x 3 with a 6cm track towards the right buttock packed with nugauze, diffuse acetowhitening with scattered punctate lesions  Complications: None.  Implants: * No implants in log *    Pema Leyva MD  PGY-3 General Surgery

## 2020-09-02 NOTE — ANESTHESIA CARE TRANSFER NOTE
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    Anesthesia Type:   MAC     Note:  Airway :Face Mask  Patient transferred to:PACU  Comments: VSS. Patient denies any pain or discomfort. All questions answered to RN. Handoff Report: Identifed the Patient, Identified the Reponsible Provider, Reviewed the pertinent medical history, Discussed the surgical course, Reviewed Intra-OP anesthesia mangement and issues during anesthesia, Set expectations for post-procedure period and Allowed opportunity for questions and acknowledgement of understanding      Vitals: (Last set prior to Anesthesia Care Transfer)    CRNA VITALS  9/2/2020 1051 - 9/2/2020 1139      9/2/2020             Pulse:  79    SpO2:  100 %    Resp Rate (observed):  12                Electronically Signed By: Quincy Salvador MD  September 2, 2020  11:39 AM

## 2020-09-02 NOTE — ANESTHESIA POSTPROCEDURE EVALUATION
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     Anesthesia Type: MAC       Disposition: Outpatient   Postop Pain Control: Uneventful            Sign Out: Well controlled pain   PONV: No   Neuro/Psych: Uneventful            Sign Out: Acceptable/Baseline neuro status   Airway/Respiratory: Uneventful            Sign Out: Acceptable/Baseline resp. status   CV/Hemodynamics: Uneventful            Sign Out: Acceptable CV status   Other NRE: NONE   DID A NON-ROUTINE EVENT OCCUR? No         Last Anesthesia Record Vitals:  CRNA VITALS  2020 1051 - 2020 1151      2020             Pulse:  79    SpO2:  100 %    Resp Rate (observed):  12          Last PACU Vitals:  Vitals Value Taken Time   /53 2020 11:30 AM   Temp     Pulse 80 2020 11:30 AM   Resp 12 2020 11:30 AM   SpO2 100 % 2020 11:34 AM   Temp src     NIBP     Pulse 79 2020 11:21 AM   SpO2 100 % 2020 11:21 AM   Resp     Temp     Ht Rate 81 2020 11:20 AM   Temp 2     Vitals shown include unvalidated device data.      Electronically Signed By: Brigido Toussaint MD, 2020, 12:03 PM

## 2020-09-02 NOTE — DISCHARGE INSTRUCTIONS
[facility]  Same-Day Surgery   Adult Discharge Orders & Instructions     For 24 hours after surgery    1. Get plenty of rest.  A responsible adult must stay with you for at least 24 hours after you leave the hospital.   2. Do not drive or use heavy equipment.  If you have weakness or tingling, don't drive or use heavy equipment until this feeling goes away.  3. Do not drink alcohol.  4. Avoid strenuous or risky activities.  Ask for help when climbing stairs.   5. You may feel lightheaded.  IF so, sit for a few minutes before standing.  Have someone help you get up.   6. If you have nausea (feel sick to your stomach): Drink only clear liquids such as apple juice, ginger ale, broth or 7-Up.  Rest may also help.  Be sure to drink enough fluids.  Move to a regular diet as you feel able.  7. You may have a slight fever. Call the doctor if your fever is over 100 F (37.7 C) (taken under the tongue) or lasts longer than 24 hours.  8. You may have a dry mouth, a sore throat, muscle aches or trouble sleeping.  These should go away after 24 hours.  9. Do not make important or legal decisions.   Call your doctor for any of the followin.  Signs of infection (fever, growing tenderness at the surgery site, a large amount of drainage or bleeding, severe pain, foul-smelling drainage, redness, swelling).    2. It has been over 8 to 10 hours since surgery and you are still not able to urinate (pass water).    3.  Headache for over 24 hours.      To contact a doctor, call Dr Mai's clinic at [phone] during clinic hours (9am-5pm) or:        [phone] and ask for the resident on call for general surgery (answered 24 hours a day)      Emergency Department:    [facility]: [phone]       (TTY for hearing impaired: [phone])

## 2020-09-02 NOTE — OP NOTE
Procedure Date: 09/02/2020      PREOPERATIVE DIAGNOSES:   1.  History of indeterminate colitis.   2.  Presumptive history of Crohn's disease.   3.  History of anal fistula.   4.  History of anal dysplasia.      POSTOPERATIVE DIAGNOSES:     1.  History of indeterminate colitis.   2.  Presumptive history of Crohn's disease.   3.  History of anal fistula.   4.  History of anal dysplasia.   5.  Large postanal ulcer/cavity.      HISTORY:  This 70-year-old man has a past history of total abdominal colectomy for colitis of uncertain etiology, felt to most likely have been ischemic.  He was found to have anal dysplasia, and I have followed him with anal microscopy for several years.  He has a past history of an anal ulcer, biopsies of which have been nondiagnostic.  Since I last saw him a year ago, he was treated for a perirectal abscess at [facility] and eventually underwent examination under anesthesia with partial fistulotomy and placement of a seton by Dr. Talib Barajas.  He returns today for a followup examination under anesthesia with flexible sigmoidoscopy to reassess his Angela remnant and anal microscopy.  I discussed the full range of risks and alternatives in detail with the patient.  I answered all of his questions to his stated satisfaction.  He expresses understanding and provided written informed consent.      SURGEON:  Sarbjit Mai MD      ASSISTANTS:  Dr. Banuelos and Dr. Leyva      DESCRIPTION OF PROCEDURE:  With the patient in the left lateral decubitus position on the split leg table and the buttocks taped apart, under intravenous sedation by Anesthesia, the perianal skin was prepped and draped in sterile fashion.  A timeout was performed and the patient and procedure confirmed.  We immediately noted a large post-anal cavitary ulcer, details below.  There was a seton in the posterior midline anal canal.  Digital rectal examination showed no masses.  Flexible sigmoidoscopy was performed to 20  cm, which was the apex of the Angela pouch.  There was minimal inflammation of the pouch.  There were no ulcerations or anything suggestive of Crohn's disease.  We obtained multiple random biopsies for assessment.  The perineum and perianal skin was next prepped and draped in sterile fashion.  A timeout was repeated.  Local infiltration of 0.25% with epinephrine was utilized, and a satisfactory perianal block was obtained.  Examination again confirmed a granulating cavity posterior to the anal canal.  This began about 3 cm from the anal margin and then extended posteriorly.  There was a small external drainage site 6 cm posterior to the posterior margin of the skin defect, which on its surface measured 2 x 3 cm.  The secondary opening was 6 cm from the posterior margin of the skin defect and to the right of midline.  A fistula probe easily passed through this and emerged from this site.  The cavity was lined with granulation tissue.  There was nothing suggestive of malignancy.  I curetted the granulation tissue and sent whatever we obtained for pathology.  In addition, I used electrocautery to excise the margin of the skin and underlying ulcer and sent this for permanent pathology in the same jar.  There was significant oozing from the raw surface, and we controlled this with electrocautery and pressure.  We next performed conventional anoscopy and saw what appeared to be an epithelialized ulcer site in the posterior midline in the anal canal.  There was no active ulcer, and this overall looked healed compared with previous exams.  A yellow vessel looped Silastic seton emerged from this area to an external opening near the anal margin externally in the posterior midline.  The anal canal and perianal skin were next soaked with 5% acetic acid, and high-definition anal microscopy was performed using the colposcope.  There was diffuse moderate acetowhitening around the anal margin but no microvascular change  externally.  Internally, there were scattered spots of scattered areas of acetowhitening and punctation.  This was most prominent on the right lateral side of the anal canal.  This area was biopsied with the baby Tischler forceps.  Hemostasis was secured, and all of the punctate tissue was fulgurated with cautery.  We reinspected the wounds and ensured hemostasis.  The postanal cavity was packed with 1-inch Nu-Gauze.  A fluff dressing was applied and procedure terminated.      Estimated blood loss was 50 mL  The patient tolerated the procedure well without evident complications.  Sponge, needle and instrument counts were reported as correct at the conclusion of case x 2.         BHANU DA SILVA MD             D: 2020   T: 2020   MT: CIERRA      Name:     DEMIAN VALDEZ   MRN:      [id number]        Account:        [id number]   :      1950           Procedure Date: 2020      Document: [id number]       cc: Talib CHADWICK MD       [facility]

## 2020-09-02 NOTE — OR NURSING
Pacemaker rep at bedside. Stated that if cautery is being used, place magnet and pt will default to 85.

## 2020-09-03 ENCOUNTER — PATIENT OUTREACH (OUTPATIENT)
Dept: SURGERY | Facility: CLINIC | Age: 70
End: 2020-09-03

## 2020-09-03 NOTE — PROGRESS NOTES
Pt states he is very sore, which he has been taking pain medication for. He says the packing from the wound did fall out. He was under some sedation still when Dr. Mai went over the plan and his wife's second language is english so she did not understand the plan. Discussed with Dr. Mai. Pt does not need to repack that wound. He should follow up with Dr. Grimes. I told him the biopsy results will determine when we need to repeat the procedure. He stated understanding and I gave him my call back number

## 2020-09-04 LAB
COPATH REPORT: NORMAL
MDC_IDC_EPISODE_DTM: NORMAL
MDC_IDC_EPISODE_DURATION: 12 S
MDC_IDC_EPISODE_DURATION: 13 S
MDC_IDC_EPISODE_DURATION: 13 S
MDC_IDC_EPISODE_DURATION: 14 S
MDC_IDC_EPISODE_DURATION: 15 S
MDC_IDC_EPISODE_DURATION: 24 S
MDC_IDC_EPISODE_DURATION: 29 S
MDC_IDC_EPISODE_DURATION: 37 S
MDC_IDC_EPISODE_ID: 104
MDC_IDC_EPISODE_ID: 105
MDC_IDC_EPISODE_ID: 106
MDC_IDC_EPISODE_ID: 107
MDC_IDC_EPISODE_ID: 108
MDC_IDC_EPISODE_ID: 109
MDC_IDC_EPISODE_ID: 110
MDC_IDC_EPISODE_ID: 45
MDC_IDC_EPISODE_TYPE: NORMAL
MDC_IDC_LEAD_IMPLANT_DT: NORMAL
MDC_IDC_LEAD_LOCATION: NORMAL
MDC_IDC_LEAD_LOCATION_DETAIL_1: NORMAL
MDC_IDC_LEAD_MFG: NORMAL
MDC_IDC_LEAD_MODEL: NORMAL
MDC_IDC_LEAD_POLARITY_TYPE: NORMAL
MDC_IDC_LEAD_SERIAL: NORMAL
MDC_IDC_MSMT_BATTERY_DTM: NORMAL
MDC_IDC_MSMT_BATTERY_REMAINING_LONGEVITY: 131 MO
MDC_IDC_MSMT_BATTERY_RRT_TRIGGER: 2.6
MDC_IDC_MSMT_BATTERY_STATUS: NORMAL
MDC_IDC_MSMT_BATTERY_VOLTAGE: 3.03 V
MDC_IDC_MSMT_LEADCHNL_LV_IMPEDANCE_VALUE: 380 OHM
MDC_IDC_MSMT_LEADCHNL_LV_IMPEDANCE_VALUE: 380 OHM
MDC_IDC_MSMT_LEADCHNL_LV_IMPEDANCE_VALUE: 399 OHM
MDC_IDC_MSMT_LEADCHNL_LV_IMPEDANCE_VALUE: 437 OHM
MDC_IDC_MSMT_LEADCHNL_LV_IMPEDANCE_VALUE: 532 OHM
MDC_IDC_MSMT_LEADCHNL_LV_IMPEDANCE_VALUE: 627 OHM
MDC_IDC_MSMT_LEADCHNL_LV_IMPEDANCE_VALUE: 646 OHM
MDC_IDC_MSMT_LEADCHNL_LV_IMPEDANCE_VALUE: 665 OHM
MDC_IDC_MSMT_LEADCHNL_LV_IMPEDANCE_VALUE: 684 OHM
MDC_IDC_MSMT_LEADCHNL_LV_IMPEDANCE_VALUE: 722 OHM
MDC_IDC_MSMT_LEADCHNL_LV_PACING_THRESHOLD_AMPLITUDE: 4.25 V
MDC_IDC_MSMT_LEADCHNL_LV_PACING_THRESHOLD_PULSEWIDTH: 0.6 MS
MDC_IDC_MSMT_LEADCHNL_RA_IMPEDANCE_VALUE: 323 OHM
MDC_IDC_MSMT_LEADCHNL_RA_IMPEDANCE_VALUE: 570 OHM
MDC_IDC_MSMT_LEADCHNL_RA_PACING_THRESHOLD_AMPLITUDE: 0.75 V
MDC_IDC_MSMT_LEADCHNL_RA_PACING_THRESHOLD_PULSEWIDTH: 0.4 MS
MDC_IDC_MSMT_LEADCHNL_RA_SENSING_INTR_AMPL: 3.1 MV
MDC_IDC_MSMT_LEADCHNL_RV_IMPEDANCE_VALUE: 342 OHM
MDC_IDC_MSMT_LEADCHNL_RV_IMPEDANCE_VALUE: 418 OHM
MDC_IDC_MSMT_LEADCHNL_RV_PACING_THRESHOLD_AMPLITUDE: 0.75 V
MDC_IDC_MSMT_LEADCHNL_RV_PACING_THRESHOLD_PULSEWIDTH: 0.4 MS
MDC_IDC_PG_IMPLANT_DTM: NORMAL
MDC_IDC_PG_MFG: NORMAL
MDC_IDC_PG_MODEL: NORMAL
MDC_IDC_PG_SERIAL: NORMAL
MDC_IDC_PG_TYPE: NORMAL
MDC_IDC_SESS_CLINIC_NAME: NORMAL
MDC_IDC_SESS_DTM: NORMAL
MDC_IDC_SESS_TYPE: NORMAL
MDC_IDC_SET_BRADY_AT_MODE_SWITCH_RATE: 171 {BEATS}/MIN
MDC_IDC_SET_BRADY_LOWRATE: 60 {BEATS}/MIN
MDC_IDC_SET_BRADY_MAX_SENSOR_RATE: 130 {BEATS}/MIN
MDC_IDC_SET_BRADY_MAX_TRACKING_RATE: 130 {BEATS}/MIN
MDC_IDC_SET_BRADY_MODE: NORMAL
MDC_IDC_SET_BRADY_PAV_DELAY_HIGH: 100 MS
MDC_IDC_SET_BRADY_PAV_DELAY_LOW: 130 MS
MDC_IDC_SET_BRADY_SAV_DELAY_HIGH: 70 MS
MDC_IDC_SET_BRADY_SAV_DELAY_LOW: 100 MS
MDC_IDC_SET_CRT_PACED_CHAMBERS: NORMAL
MDC_IDC_SET_LEADCHNL_RA_PACING_AMPLITUDE: 1.75 V
MDC_IDC_SET_LEADCHNL_RA_PACING_ANODE_ELECTRODE_1: NORMAL
MDC_IDC_SET_LEADCHNL_RA_PACING_ANODE_LOCATION_1: NORMAL
MDC_IDC_SET_LEADCHNL_RA_PACING_CAPTURE_MODE: NORMAL
MDC_IDC_SET_LEADCHNL_RA_PACING_CATHODE_ELECTRODE_1: NORMAL
MDC_IDC_SET_LEADCHNL_RA_PACING_CATHODE_LOCATION_1: NORMAL
MDC_IDC_SET_LEADCHNL_RA_PACING_POLARITY: NORMAL
MDC_IDC_SET_LEADCHNL_RA_PACING_PULSEWIDTH: 0.4 MS
MDC_IDC_SET_LEADCHNL_RA_SENSING_ANODE_ELECTRODE_1: NORMAL
MDC_IDC_SET_LEADCHNL_RA_SENSING_ANODE_LOCATION_1: NORMAL
MDC_IDC_SET_LEADCHNL_RA_SENSING_CATHODE_ELECTRODE_1: NORMAL
MDC_IDC_SET_LEADCHNL_RA_SENSING_CATHODE_LOCATION_1: NORMAL
MDC_IDC_SET_LEADCHNL_RA_SENSING_POLARITY: NORMAL
MDC_IDC_SET_LEADCHNL_RA_SENSING_SENSITIVITY: 0.3 MV
MDC_IDC_SET_LEADCHNL_RV_PACING_AMPLITUDE: 2 V
MDC_IDC_SET_LEADCHNL_RV_PACING_ANODE_ELECTRODE_1: NORMAL
MDC_IDC_SET_LEADCHNL_RV_PACING_ANODE_LOCATION_1: NORMAL
MDC_IDC_SET_LEADCHNL_RV_PACING_CAPTURE_MODE: NORMAL
MDC_IDC_SET_LEADCHNL_RV_PACING_CATHODE_ELECTRODE_1: NORMAL
MDC_IDC_SET_LEADCHNL_RV_PACING_CATHODE_LOCATION_1: NORMAL
MDC_IDC_SET_LEADCHNL_RV_PACING_POLARITY: NORMAL
MDC_IDC_SET_LEADCHNL_RV_PACING_PULSEWIDTH: 0.4 MS
MDC_IDC_SET_LEADCHNL_RV_SENSING_ANODE_ELECTRODE_1: NORMAL
MDC_IDC_SET_LEADCHNL_RV_SENSING_ANODE_LOCATION_1: NORMAL
MDC_IDC_SET_LEADCHNL_RV_SENSING_CATHODE_ELECTRODE_1: NORMAL
MDC_IDC_SET_LEADCHNL_RV_SENSING_CATHODE_LOCATION_1: NORMAL
MDC_IDC_SET_LEADCHNL_RV_SENSING_POLARITY: NORMAL
MDC_IDC_SET_LEADCHNL_RV_SENSING_SENSITIVITY: 0.9 MV
MDC_IDC_SET_ZONE_DETECTION_INTERVAL: 350 MS
MDC_IDC_SET_ZONE_DETECTION_INTERVAL: 400 MS
MDC_IDC_SET_ZONE_TYPE: NORMAL
MDC_IDC_STAT_AT_BURDEN_PERCENT: 0 %
MDC_IDC_STAT_AT_DTM_END: NORMAL
MDC_IDC_STAT_AT_DTM_START: NORMAL
MDC_IDC_STAT_BRADY_AP_VP_PERCENT: 13.65 %
MDC_IDC_STAT_BRADY_AP_VS_PERCENT: 0 %
MDC_IDC_STAT_BRADY_AS_VP_PERCENT: 86.25 %
MDC_IDC_STAT_BRADY_AS_VS_PERCENT: 0.09 %
MDC_IDC_STAT_BRADY_DTM_END: NORMAL
MDC_IDC_STAT_BRADY_DTM_START: NORMAL
MDC_IDC_STAT_BRADY_RA_PERCENT_PACED: 13.69 %
MDC_IDC_STAT_BRADY_RV_PERCENT_PACED: 99.9 %
MDC_IDC_STAT_CRT_DTM_END: NORMAL
MDC_IDC_STAT_CRT_DTM_START: NORMAL
MDC_IDC_STAT_CRT_LV_PERCENT_PACED: 0 %
MDC_IDC_STAT_CRT_PERCENT_PACED: 0 %
MDC_IDC_STAT_EPISODE_RECENT_COUNT: 0
MDC_IDC_STAT_EPISODE_RECENT_COUNT_DTM_END: NORMAL
MDC_IDC_STAT_EPISODE_RECENT_COUNT_DTM_START: NORMAL
MDC_IDC_STAT_EPISODE_TOTAL_COUNT: 0
MDC_IDC_STAT_EPISODE_TOTAL_COUNT_DTM_END: NORMAL
MDC_IDC_STAT_EPISODE_TOTAL_COUNT_DTM_START: NORMAL
MDC_IDC_STAT_EPISODE_TYPE: NORMAL

## 2020-09-17 ENCOUNTER — TELEPHONE (OUTPATIENT)
Dept: SURGERY | Facility: CLINIC | Age: 70
End: 2020-09-17

## 2020-09-17 ENCOUNTER — PATIENT OUTREACH (OUTPATIENT)
Dept: SURGERY | Facility: CLINIC | Age: 70
End: 2020-09-17

## 2020-09-17 NOTE — TELEPHONE ENCOUNTER
Patient called, he wants a copy of an X-Ray we have in his chart, so I gave him the number for HIM.    Patient wanted his biopsy results, so Clinic Supervisor JOSE Benavides went over them with him from my desk/computer. I sent Makayla a message to follow up further with Dr Mai and patient about his biopsy results and to let patient know when he will be due for his next HRA.

## 2020-09-28 NOTE — PROGRESS NOTES
Discussed patient's biopsy results after he called Kayla. Advised patient of his biopsy results:     FINAL DIAGNOSIS:   A. RECTUM AND SIGMOID RANDOM, BIOPSIES:   - Mildly active chronic colitis   - See comment     B. ANAL ULCER:   - Squamous mucosa with ulceration, granulation tissue formation and acute   and chronic inflammation with giant   cell reaction   - Negative for dysplasia or malignancy     C. RIGHT LATERAL ANAL CANAL:   - Squamous mucosa with acute and chronic inflammation and ulceration   - Negative for dysplasia or malignancy     Advised patient of above results and discussed all of patient's concerns.

## 2021-03-11 ENCOUNTER — TRANSFERRED RECORDS (OUTPATIENT)
Dept: HEALTH INFORMATION MANAGEMENT | Facility: CLINIC | Age: 71
End: 2021-03-11

## 2021-07-05 DIAGNOSIS — Z87.19 HISTORY OF ANAL DYSPLASIA: Primary | ICD-10-CM

## 2021-07-07 ENCOUNTER — TELEPHONE (OUTPATIENT)
Dept: SURGERY | Facility: CLINIC | Age: 71
End: 2021-07-07

## 2021-07-20 PROBLEM — Z87.19 HISTORY OF ANAL DYSPLASIA: Status: ACTIVE | Noted: 2020-07-23

## 2021-07-20 NOTE — TELEPHONE ENCOUNTER
Case Request received to schedule:    Patient Name: Anselmo Rainey   MRN: [id number]   Case#: [id number]   Surgeon(s) and Role:      * Sarbjit Mai MD - Primary   Date requested: * No dates entered *   Location:  OR   Procedure(s):   EXAM UNDER ANESTHESIA, High resolution anoscopy, flexible sigmoidoscopy (N/A)   ANOSCOPY (N/A)   SIGMOIDOSCOPY, FLEXIBLE (N/A)     Additional Instructions for the Case  Multi-surgeon case:  no  Time in minutes:  60  Anesthesia: MAC  Prep: 2 fleets  Pre-Op:  PAC vs PCP  Labs: no  WOC: no  Special equipment: colposcope  Special Instructions: August On 7/20/2021:  Patient is scheduled for surgery with Dr. Sarbjit Mai    Spoke with: Long    Date of Surgery: 9/8/2021    Location: [place]    Informed patient they will need an adult  YES    H&P: Scheduled with [facility]    Pre-procedure COVID-19 Test: AN Laboratory on Saturday 9/4/2021 at 1:00 PM    Surgery packet: will send via Mail     Additional comments:   - message sent to JOSE El on 7/20 regarding [facility] Prior Auth  - Patient STRONGLY PREFERS 1st CASE

## 2021-07-21 DIAGNOSIS — Z11.59 ENCOUNTER FOR SCREENING FOR OTHER VIRAL DISEASES: ICD-10-CM

## 2021-08-06 ENCOUNTER — TRANSFERRED RECORDS (OUTPATIENT)
Dept: HEALTH INFORMATION MANAGEMENT | Facility: CLINIC | Age: 71
End: 2021-08-06

## 2021-08-06 LAB — HBA1C MFR BLD: 8.9 % (ref 4–6)

## 2021-08-28 ENCOUNTER — TRANSFERRED RECORDS (OUTPATIENT)
Dept: HEALTH INFORMATION MANAGEMENT | Facility: CLINIC | Age: 71
End: 2021-08-28

## 2021-09-02 NOTE — TELEPHONE ENCOUNTER
On 9/2/2021:  Patient called in concerned that his [facility] doctor with whom he's having a Pre-op H&P tomorrow may not have received a referral or paperwork from [facility]. He thought that I was charged with sending this in the form of his Surgery Packet, and I said that is not how it works - I mailed his updated Surgery Packet to him today, but the [facility] should have requested any paperwork from us that they need. I asked if the VA doctor had told him he was missing needed info? Patient said that the doctor had not expressed any such concern - he was just double-checking. I just explained that the same process has been followed this time as it has in relation to every previous procedure he's had at [facility] in the past, so if it the VA had what they needed in the past, then they should also have it this time. He provided contact info for the [facility] in [place], [place]. I passed that info along to BRIT Gaston via in basket message, in-case it is needed.    Surgery Packet mailed to patient w/ the following info:    Your surgery is scheduled:    Date: Wednesday September 8, 2021  ________________________________    Time: 7:30 AM*  ________________________________    Please arrive at:  5:30 AM*  ______________________    Surgeon's Name:     - Dr. Sarbjit Mai  _______________________      Pre-Op Physical Fax Numbers:         [facility] Fax:  [phone] / Phone:  [phone]        Your surgery is located at:      [facility]      [facility] [address])      [place] [address]      [phone]      [email or web address]   *Times are tentative and may change. You can expect a call from the pre-admission nurses to confirm arrival and surgery start times if the times should change.     Required: Yes, you will need a  18 years or older to drive you home from your procedure as well as stay with you for  "24 hours after your procedure    Surgery: Exam under anesthesia, high resolution anoscopy    Pre-operative Physical appointment:   Clinic appointment with Dr. Diaz at [facility] in [place], [place] on 9/3/2021    Pre-operative COVID-19 Test:  Pre-procedure asymptomatic COVID PCR:  Saturday 9/4/2021 at 1:00 PM                                                                      [facility]        [id number] [address]    Preparation: 2 Fleet Enemas (See \"Day of Surgery\")    "

## 2021-09-04 ENCOUNTER — LAB (OUTPATIENT)
Dept: LAB | Facility: CLINIC | Age: 71
End: 2021-09-04
Payer: MEDICARE

## 2021-09-04 DIAGNOSIS — Z11.59 ENCOUNTER FOR SCREENING FOR OTHER VIRAL DISEASES: ICD-10-CM

## 2021-09-04 PROCEDURE — U0003 INFECTIOUS AGENT DETECTION BY NUCLEIC ACID (DNA OR RNA); SEVERE ACUTE RESPIRATORY SYNDROME CORONAVIRUS 2 (SARS-COV-2) (CORONAVIRUS DISEASE [COVID-19]), AMPLIFIED PROBE TECHNIQUE, MAKING USE OF HIGH THROUGHPUT TECHNOLOGIES AS DESCRIBED BY CMS-2020-01-R: HCPCS

## 2021-09-04 PROCEDURE — U0005 INFEC AGEN DETEC AMPLI PROBE: HCPCS

## 2021-09-05 LAB — SARS-COV-2 RNA RESP QL NAA+PROBE: NEGATIVE

## 2021-09-07 ENCOUNTER — ANESTHESIA EVENT (OUTPATIENT)
Dept: SURGERY | Facility: CLINIC | Age: 71
End: 2021-09-07
Payer: COMMERCIAL

## 2021-09-07 ASSESSMENT — COPD QUESTIONNAIRES: COPD: 1

## 2021-09-07 NOTE — ANESTHESIA PREPROCEDURE EVALUATION
Anesthesia Pre-Procedure Evaluation    Patient: Anselmo Rainey   MRN: [id number] : 1950        Preoperative Diagnosis: 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]   Procedure : Procedure(s):  EXAM UNDER ANESTHESIA  High resolution anoscopy  flexible sigmoidoscopy     Past Medical History:   Diagnosis Date     Congestive heart failure, unspecified      COPD (chronic obstructive pulmonary disease) (H)      Coronary artery disease      Diabetes (H)      Gastro-oesophageal reflux disease      Heart disease      History of blood transfusion      History of rectal bleeding      Hypertension      Ileostomy in place (H)      Ischemic colitis (H)      Obese      Pacemaker      PONV (postoperative nausea and vomiting)      Psoriasis      PTSD (post-traumatic stress disorder)      Seizures (H)      Sleep apnea     no cpap     Stented coronary artery     2 STENTS      Past Surgical History:   Procedure Laterality Date     ANOSCOPY  2014    Procedure: ANOSCOPY;  Surgeon: Sarbjit Mai MD;  Location: [facility]     ANOSCOPY Right 2019    Procedure: Exam Under Anesthesia, Microscopy and fulguration of dysplagia, biopsy of right buttock lesion;  Surgeon: Sarbjit Mai MD;  [facility]: [facility] OR     ANOSCOPY N/A 2020    Procedure: EXAM UNDER ANESTHESIA with debridement and biopsy of post-anal ulcer, high definition anal Microscopy with biopsy and fulguration of dysplagia;  Surgeon: Sarbjit Mai MD;  Location: [facility] OR     BIOPSY ANAL N/A 10/22/2014    Procedure: BIOPSY ANAL;  Surgeon: Sarbjit Mai MD;  Location: [facility] OR     C LAP,SURG,COLECTOMY,TOTAL,W/PROCTECTOMY       COLONOSCOPY       EXAM UNDER ANESTHESIA ANUS  2014    Procedure: EXAM UNDER ANESTHESIA ANUS;  Surgeon: Sarbjit Mai MD;  [facility]     EXAM UNDER ANESTHESIA ANUS N/A 10/22/2014    Procedure: EXAM UNDER ANESTHESIA ANUS;  Surgeon: Sarbjit Mai MD;  [facility]     EXAM UNDER ANESTHESIA ANUS N/A 2017    Procedure: EXAM UNDER  ANESTHESIA ANUS;  Anal Exam with Anal Microscopy and Biopsies ;  Surgeon: Sarbjit Mai MD;  [facility]     EXAM UNDER ANESTHESIA ANUS N/A 8/30/2017    Procedure: EXAM UNDER ANESTHESIA ANUS;  Examination Under anesthesia, Anal Microscopy With Biopsies, fulguration ;  Surgeon: Sarbjit Mai MD;  [facility]: [facility]     EXAM UNDER ANESTHESIA ANUS N/A 3/14/2018    Procedure: EXAM UNDER ANESTHESIA ANUS;  Anal Examination Under Anesthesia, Anal Microscopy fulgeration of dysplasia;  Surgeon: Sarbjit Mai MD;  [facility]     EXAM UNDER ANESTHESIA RECTUM N/A 6/8/2015    Procedure: EXAM UNDER ANESTHESIA RECTUM;  Surgeon: Sarbjit Mai MD;  Location: [facility] OR     HC PERIANAL BIOPSY      with emergency surgery for post excision or post biopsy hemorrhage     ILEOSTOMY       ILEOSTOMY       MICROSCOPY ANAL N/A 10/22/2014    Procedure: MICROSCOPY ANAL;  Surgeon: Sarbjit Mai MD;  [facility]     MICROSCOPY ANAL N/A 6/8/2015    Procedure: MICROSCOPY ANAL;  Surgeon: Sarbjit Mai MD;  [facility]     MICROSCOPY ANAL N/A 4/26/2017    Procedure: MICROSCOPY ANAL;;  Surgeon: Sarbjit Mai MD;  [facility]     MICROSCOPY ANAL N/A 8/30/2017    Procedure: MICROSCOPY ANAL;;  Surgeon: Sarbjit Mai MD;  [facility]     MICROSCOPY ANAL N/A 3/14/2018    Procedure: MICROSCOPY ANAL;;  Surgeon: Sarbjit Mai MD;  [facility]     MICROSCOPY ANAL N/A 10/10/2018    Procedure: MICROSCOPY ANAL;  Anal Exam, Anal Microscopy, Fulgeration of Dysplagia;  Surgeon: Sarbjit Mai MD;  [facility]: [facility]     SIGMOIDOSCOPY FLEXIBLE  5/28/2014    Procedure: SIGMOIDOSCOPY FLEXIBLE;  Surgeon: Sarbjit Mai MD;  [facility]     SIGMOIDOSCOPY FLEXIBLE N/A 9/2/2020    Procedure: SIGMOIDOSCOPY, FLEXIBLE with biopsies;  Surgeon: Sarbjit Mai MD;  Location: [facility] OR     STENT        Allergies   Allergen Reactions     Cephalexin Difficulty breathing     Gemfibrozil Other (See Comments)     unknown     Levofloxacin  Difficulty breathing     Throat tightens and he gets a spotty rash     Lisinopril Cough      Social History     Tobacco Use     Smoking status: Former Smoker     Quit date: 1998     Years since quittin.8     Smokeless tobacco: Never Used     Tobacco comment: Quit in    Substance Use Topics     Alcohol use: No      Wt Readings from Last 1 Encounters:   20 126.3 kg (278 lb 7.1 oz)        Anesthesia Evaluation   Pt has had prior anesthetic. Type: General.        ROS/MED HX  ENT/Pulmonary:     (+) sleep apnea, doesn't use CPAP, mild,  COPD,     Neurologic:       Cardiovascular:     (+) hypertension--CAD ---CHF Last EF: 55% date:  pacemaker, Reason placed: 2nd Degree Mobitz 1. type: BiV,     METS/Exercise Tolerance:     Hematologic:     (+) anemia,     Musculoskeletal:  - neg musculoskeletal ROS     GI/Hepatic:     (+) GERD, Inflammatory bowel disease,     Renal/Genitourinary:     (+) renal disease, type: CRI, Pt does not require dialysis,     Endo:     (+) type II DM, Diabetic complications: nephropathy. Obesity,     Psychiatric/Substance Use:     (+) psychiatric history depression (PTSD)     Infectious Disease:  - neg infectious disease ROS     Malignancy:   (+) Malignancy, History of GI.    Other:            Physical Exam    Airway      Comment: Full beard    Mallampati: III   TM distance: > 3 FB   Neck ROM: full   Mouth opening: > 3 cm    Respiratory Devices and Support         Dental         B=Bridge, C=Chipped, L=Loose, M=Missing    Cardiovascular   cardiovascular exam normal          Pulmonary   pulmonary exam normal                OUTSIDE LABS:  CBC:   Lab Results   Component Value Date    HGB 12.6 (L) 2019     BMP:   Lab Results   Component Value Date    POTASSIUM 4.1 2020    POTASSIUM 4.4 2019    CR 1.29 (H) 2020    CR 1.09 2019     COAGS: No results found for: PTT, INR, FIBR  POC:   Lab Results   Component Value Date     (H) 2020     HEPATIC:  No results found for: ALBUMIN, PROTTOTAL, ALT, AST, GGT, ALKPHOS, BILITOTAL, BILIDIRECT, SWATI  OTHER: No results found for: PH, LACT, A1C, SHIMON, PHOS, MAG, LIPASE, AMYLASE, TSH, T4, T3, CRP, SED    Anesthesia Plan    ASA Status:  3   NPO Status:  NPO Appropriate    Anesthesia Type: General.     - Airway: ETT   Induction: Intravenous, Propofol.   Maintenance: Balanced.        Consents    Anesthesia Plan(s) and associated risks, benefits, and realistic alternatives discussed. Questions answered and patient/representative(s) expressed understanding.     - Discussed with:  Patient      - Extended Intubation/Ventilatory Support Discussed: No.      - Patient is DNR/DNI Status: No    Use of blood products discussed: No .     Postoperative Care            Comments:    Discussed MAC and GA with patient.  If positioning and procedure amenable will proceed with MAC, otherwise GA is also acceptable risk with patient.            Demario Jaeger MD

## 2021-09-08 ENCOUNTER — DOCUMENTATION ONLY (OUTPATIENT)
Dept: SURGERY | Facility: CLINIC | Age: 71
End: 2021-09-08

## 2021-09-08 ENCOUNTER — ANESTHESIA (OUTPATIENT)
Dept: SURGERY | Facility: CLINIC | Age: 71
End: 2021-09-08
Payer: COMMERCIAL

## 2021-09-08 ENCOUNTER — HOSPITAL ENCOUNTER (OUTPATIENT)
Facility: CLINIC | Age: 71
Discharge: HOME | End: 2021-09-08
Attending: COLON & RECTAL SURGERY | Admitting: COLON & RECTAL SURGERY
Payer: COMMERCIAL

## 2021-09-08 VITALS
BODY MASS INDEX: 43.49 KG/M2 | TEMPERATURE: 98.2 F | HEIGHT: 67 IN | WEIGHT: 277.12 LBS | DIASTOLIC BLOOD PRESSURE: 56 MMHG | RESPIRATION RATE: 18 BRPM | OXYGEN SATURATION: 97 % | HEART RATE: 172 BPM | SYSTOLIC BLOOD PRESSURE: 119 MMHG

## 2021-09-08 DIAGNOSIS — Z87.19 HISTORY OF ANAL DYSPLASIA: Primary | ICD-10-CM

## 2021-09-08 LAB
GLUCOSE BLDC GLUCOMTR-MCNC: 218 MG/DL (ref 70–99)
GLUCOSE BLDC GLUCOMTR-MCNC: 301 MG/DL (ref 70–99)

## 2021-09-08 PROCEDURE — 272N000001 HC OR GENERAL SUPPLY STERILE: Performed by: COLON & RECTAL SURGERY

## 2021-09-08 PROCEDURE — 710N000012 HC RECOVERY PHASE 2, PER MINUTE: Performed by: COLON & RECTAL SURGERY

## 2021-09-08 PROCEDURE — 88305 TISSUE EXAM BY PATHOLOGIST: CPT | Mod: TC | Performed by: COLON & RECTAL SURGERY

## 2021-09-08 PROCEDURE — 46020 PLACEMENT OF SETON: CPT | Performed by: COLON & RECTAL SURGERY

## 2021-09-08 PROCEDURE — 250N000013 HC RX MED GY IP 250 OP 250 PS 637: Performed by: COLON & RECTAL SURGERY

## 2021-09-08 PROCEDURE — 250N000009 HC RX 250: Performed by: COLON & RECTAL SURGERY

## 2021-09-08 PROCEDURE — 258N000003 HC RX IP 258 OP 636: Performed by: NURSE ANESTHETIST, CERTIFIED REGISTERED

## 2021-09-08 PROCEDURE — 360N000075 HC SURGERY LEVEL 2, PER MIN: Performed by: COLON & RECTAL SURGERY

## 2021-09-08 PROCEDURE — 999N000141 HC STATISTIC PRE-PROCEDURE NURSING ASSESSMENT: Performed by: COLON & RECTAL SURGERY

## 2021-09-08 PROCEDURE — 370N000017 HC ANESTHESIA TECHNICAL FEE, PER MIN: Performed by: COLON & RECTAL SURGERY

## 2021-09-08 PROCEDURE — 250N000013 HC RX MED GY IP 250 OP 250 PS 637: Performed by: ANESTHESIOLOGY

## 2021-09-08 PROCEDURE — 250N000011 HC RX IP 250 OP 636: Performed by: NURSE ANESTHETIST, CERTIFIED REGISTERED

## 2021-09-08 PROCEDURE — 250N000009 HC RX 250: Performed by: NURSE ANESTHETIST, CERTIFIED REGISTERED

## 2021-09-08 PROCEDURE — 45331 SIGMOIDOSCOPY AND BIOPSY: CPT | Performed by: COLON & RECTAL SURGERY

## 2021-09-08 RX ORDER — HALOPERIDOL 5 MG/ML
1 INJECTION INTRAMUSCULAR
Status: DISCONTINUED | OUTPATIENT
Start: 2021-09-08 | End: 2021-09-08 | Stop reason: HOSPADM

## 2021-09-08 RX ORDER — ONDANSETRON 2 MG/ML
4 INJECTION INTRAMUSCULAR; INTRAVENOUS EVERY 30 MIN PRN
Status: DISCONTINUED | OUTPATIENT
Start: 2021-09-08 | End: 2021-09-08 | Stop reason: HOSPADM

## 2021-09-08 RX ORDER — OXYCODONE HYDROCHLORIDE 5 MG/1
5 TABLET ORAL EVERY 4 HOURS PRN
Status: DISCONTINUED | OUTPATIENT
Start: 2021-09-08 | End: 2021-09-08 | Stop reason: HOSPADM

## 2021-09-08 RX ORDER — ACETAMINOPHEN 325 MG/1
975 TABLET ORAL ONCE
Status: COMPLETED | OUTPATIENT
Start: 2021-09-08 | End: 2021-09-08

## 2021-09-08 RX ORDER — MEPERIDINE HYDROCHLORIDE 25 MG/ML
12.5 INJECTION INTRAMUSCULAR; INTRAVENOUS; SUBCUTANEOUS
Status: DISCONTINUED | OUTPATIENT
Start: 2021-09-08 | End: 2021-09-08 | Stop reason: HOSPADM

## 2021-09-08 RX ORDER — LABETALOL HYDROCHLORIDE 5 MG/ML
10 INJECTION, SOLUTION INTRAVENOUS
Status: DISCONTINUED | OUTPATIENT
Start: 2021-09-08 | End: 2021-09-08 | Stop reason: HOSPADM

## 2021-09-08 RX ORDER — NALOXONE HYDROCHLORIDE 0.4 MG/ML
0.2 INJECTION, SOLUTION INTRAMUSCULAR; INTRAVENOUS; SUBCUTANEOUS
Status: DISCONTINUED | OUTPATIENT
Start: 2021-09-08 | End: 2021-09-08 | Stop reason: HOSPADM

## 2021-09-08 RX ORDER — FENTANYL CITRATE 50 UG/ML
25-50 INJECTION, SOLUTION INTRAMUSCULAR; INTRAVENOUS EVERY 5 MIN PRN
Status: DISCONTINUED | OUTPATIENT
Start: 2021-09-08 | End: 2021-09-08 | Stop reason: HOSPADM

## 2021-09-08 RX ORDER — SODIUM CHLORIDE, SODIUM LACTATE, POTASSIUM CHLORIDE, CALCIUM CHLORIDE 600; 310; 30; 20 MG/100ML; MG/100ML; MG/100ML; MG/100ML
INJECTION, SOLUTION INTRAVENOUS CONTINUOUS PRN
Status: DISCONTINUED | OUTPATIENT
Start: 2021-09-08 | End: 2021-09-08

## 2021-09-08 RX ORDER — ONDANSETRON 4 MG/1
4 TABLET, ORALLY DISINTEGRATING ORAL EVERY 30 MIN PRN
Status: DISCONTINUED | OUTPATIENT
Start: 2021-09-08 | End: 2021-09-08 | Stop reason: HOSPADM

## 2021-09-08 RX ORDER — FENTANYL CITRATE 50 UG/ML
INJECTION, SOLUTION INTRAMUSCULAR; INTRAVENOUS PRN
Status: DISCONTINUED | OUTPATIENT
Start: 2021-09-08 | End: 2021-09-08

## 2021-09-08 RX ORDER — FENTANYL CITRATE 50 UG/ML
25 INJECTION, SOLUTION INTRAMUSCULAR; INTRAVENOUS
Status: DISCONTINUED | OUTPATIENT
Start: 2021-09-08 | End: 2021-09-08 | Stop reason: HOSPADM

## 2021-09-08 RX ORDER — LIDOCAINE HYDROCHLORIDE 20 MG/ML
INJECTION, SOLUTION INFILTRATION; PERINEURAL PRN
Status: DISCONTINUED | OUTPATIENT
Start: 2021-09-08 | End: 2021-09-08

## 2021-09-08 RX ORDER — PROPOFOL 10 MG/ML
INJECTION, EMULSION INTRAVENOUS CONTINUOUS PRN
Status: DISCONTINUED | OUTPATIENT
Start: 2021-09-08 | End: 2021-09-08

## 2021-09-08 RX ORDER — LIDOCAINE 40 MG/G
CREAM TOPICAL
Status: DISCONTINUED | OUTPATIENT
Start: 2021-09-08 | End: 2021-09-08 | Stop reason: HOSPADM

## 2021-09-08 RX ORDER — ACETAMINOPHEN 325 MG/1
1000 TABLET ORAL EVERY 6 HOURS PRN
Refills: 0 | COMMUNITY
Start: 2021-09-08 | End: 2022-10-19

## 2021-09-08 RX ORDER — HYDROMORPHONE HYDROCHLORIDE 1 MG/ML
.2-.4 INJECTION, SOLUTION INTRAMUSCULAR; INTRAVENOUS; SUBCUTANEOUS EVERY 5 MIN PRN
Status: DISCONTINUED | OUTPATIENT
Start: 2021-09-08 | End: 2021-09-08 | Stop reason: HOSPADM

## 2021-09-08 RX ORDER — ACETIC ACID 5 %
LIQUID (ML) MISCELLANEOUS PRN
Status: DISCONTINUED | OUTPATIENT
Start: 2021-09-08 | End: 2021-09-08 | Stop reason: HOSPADM

## 2021-09-08 RX ORDER — ONDANSETRON 2 MG/ML
4 INJECTION INTRAMUSCULAR; INTRAVENOUS ONCE
Status: DISCONTINUED | OUTPATIENT
Start: 2021-09-08 | End: 2021-09-08 | Stop reason: HOSPADM

## 2021-09-08 RX ORDER — SODIUM CHLORIDE, SODIUM LACTATE, POTASSIUM CHLORIDE, CALCIUM CHLORIDE 600; 310; 30; 20 MG/100ML; MG/100ML; MG/100ML; MG/100ML
INJECTION, SOLUTION INTRAVENOUS CONTINUOUS
Status: DISCONTINUED | OUTPATIENT
Start: 2021-09-08 | End: 2021-09-08 | Stop reason: HOSPADM

## 2021-09-08 RX ORDER — BUPIVACAINE HYDROCHLORIDE AND EPINEPHRINE 2.5; 5 MG/ML; UG/ML
INJECTION, SOLUTION EPIDURAL; INFILTRATION; INTRACAUDAL; PERINEURAL PRN
Status: DISCONTINUED | OUTPATIENT
Start: 2021-09-08 | End: 2021-09-08 | Stop reason: HOSPADM

## 2021-09-08 RX ORDER — NALOXONE HYDROCHLORIDE 0.4 MG/ML
0.4 INJECTION, SOLUTION INTRAMUSCULAR; INTRAVENOUS; SUBCUTANEOUS
Status: DISCONTINUED | OUTPATIENT
Start: 2021-09-08 | End: 2021-09-08 | Stop reason: HOSPADM

## 2021-09-08 RX ORDER — ONDANSETRON 2 MG/ML
INJECTION INTRAMUSCULAR; INTRAVENOUS PRN
Status: DISCONTINUED | OUTPATIENT
Start: 2021-09-08 | End: 2021-09-08

## 2021-09-08 RX ADMIN — FENTANYL CITRATE 50 MCG: 50 INJECTION, SOLUTION INTRAMUSCULAR; INTRAVENOUS at 07:46

## 2021-09-08 RX ADMIN — HUMAN INSULIN 6 UNITS: 100 INJECTION, SOLUTION SUBCUTANEOUS at 07:12

## 2021-09-08 RX ADMIN — ONDANSETRON 4 MG: 2 INJECTION INTRAMUSCULAR; INTRAVENOUS at 09:11

## 2021-09-08 RX ADMIN — ACETAMINOPHEN 975 MG: 325 TABLET, FILM COATED ORAL at 06:53

## 2021-09-08 RX ADMIN — LIDOCAINE HYDROCHLORIDE 80 MG: 20 INJECTION, SOLUTION INFILTRATION; PERINEURAL at 07:50

## 2021-09-08 RX ADMIN — SODIUM CHLORIDE, POTASSIUM CHLORIDE, SODIUM LACTATE AND CALCIUM CHLORIDE: 600; 310; 30; 20 INJECTION, SOLUTION INTRAVENOUS at 07:46

## 2021-09-08 RX ADMIN — PROPOFOL 125 MCG/KG/MIN: 10 INJECTION, EMULSION INTRAVENOUS at 07:54

## 2021-09-08 ASSESSMENT — MIFFLIN-ST. JEOR: SCORE: 1970.63

## 2021-09-08 ASSESSMENT — COPD QUESTIONNAIRES: CAT_SEVERITY: MILD

## 2021-09-08 NOTE — OP NOTE
Procedure Date: 09/08/2021    PREOPERATIVE DIAGNOSES:     1.  History of anal dysplasia.  2.  Anal fistula with indwelling seton.  3.  History of anal ulcer.  4.  Coronary artery disease, status post coronary artery stent.  5.  Congestive heart failure.  6.  Chronic obstructive pulmonary disease.  7.  Diabetes mellitus.  8.  Hypertension.  9.  Ileostomy in place.  10.  Morbid obesity (body mass index of 43.4).  11.  Pacemaker.  12.  Seizure disorder.  13.  Sleep apnea.    POSTOPERATIVE DIAGNOSES:   1.  History of anal dysplasia.  2.  Anal fistula with indwelling seton.  3.  History of anal ulcer.  4.  Coronary artery disease, status post coronary artery stent.  5.  Congestive heart failure.  6.  Chronic obstructive pulmonary disease.  7.  Diabetes mellitus.  8.  Hypertension.  9.  Ileostomy in place.  10.  Morbid obesity (body mass index of 43.4).  11.  Pacemaker.  12.  Seizure disorder.  13.  Sleep apnea.     PROCEDURE:  Examination under anesthesia; flexible sigmoidoscopy with biopsies; high definition anal microscopy with biopsies; seton replacement.    HISTORY:  Anselmo Rainey is a 71-year-old man who underwent total abdominal colectomy with ileostomy for colitis of uncertain etiology.  This was felt to have been most likely ischemic, but more recently, the patient was diagnosed with Crohn's disease by Dr. Michael Loo and he has since been on Remicade.  Since institution of Remicade, his longstanding psoriasis has cleared up.  He has had longstanding anal ulcers, biopsies of which have always been nondiagnostic.  He developed a perirectal abscess and underwent partial fistulotomy and seton placement by Dr. Talib Barajas in the past few years.  At the time of his last examination under anesthesia on 09/02/2020, I noted a large postanal cavity that I unroofed and biopsied.  There was a giant cell reaction, but this was not considered to be Crohn's disease, and biopsies showed no evidence of malignancy.  I had laid  open that cavity, and since that procedure, the cavity, per the patient report, has healed.  Operative microscopy demonstrated patchy acetowhitening and biopsies demonstrated only AIN 1.  The patient now returns for followup sigmoidoscopy and anal microscopy.  I discussed the risks and alternatives in detail with him.  I answered all of his questions to his stated satisfaction.  He expressed understanding and provided written informed consent.    SURGEON:  Sarbjit Mai MD    ASSISTANT:  Minna Justin MD    DESCRIPTION OF PROCEDURE:  With the patient in the left lateral decubitus position and the buttocks taped apart, under intravenous sedation by Anesthesia, a timeout was performed.  Flexible sigmoidoscopy was performed without difficulty in the apex of the Angela pouch, which measured roughly 25 cm.  The mucosa of the pouch looked entirely healthy with the exception of the anal canal.  Multiple surveillance biopsies were obtained.  Within the anal canal, we could visualize the posterior midline seton and just proximal to this, an epithelialized anal ulcer.  This was biopsied with the cold biopsy forceps, but was grossly quite benign.  The anal canal and perianal skin were then soaked with 5% acetic acid after we placed a local block of 0.25% Marcaine with epinephrine.  High definition anal microscopy was performed using the colposcope.  There was friability within the anal canal with active bleeding at the anal ulcer biopsy sites and small areas of punctate hemorrhage that were controlled with electrocautery.  There was a substantially enlarged external hemorrhoid in the anterior midline.  This was nonthrombosed.  Findings on microscopy included again only patchy acetowhitening with one area that had very questionable scattered spots of micro punctation.  This area in the right lateral anal canal was biopsied and fulgurated.  A second patch of acetowhite tissue was biopsied in the left lateral anal canal.   Hemostasis was secured with electrocautery.  The previous seton was now over 2 years old and was beginning to discolor, so I chose to replace this prophylactically.  A new yellow Silastic vessel loop seton was passed through the previous tract and secured to itself with 4-0 silk LigaSures.  There was a modest amount of involved sphincter, perhaps 1 cm in length.  The patient could consider a fistulotomy if the seton is bothersome as there is no real likelihood of closing his ileostomy.  I think the conservative play would be just to leave the current seton in place if it is not problematic.  A fluff dressing was then applied and procedure terminated.  Estimated blood loss was 10 mL. The patient tolerated the procedure well and without evident complications.    We will await pathology results.  If there is nothing but low-grade dysplasia, repeat sigmoidoscopy and anal microscopy should be done in 1 year.  Based on the difficulty of this examination and the patient's large body habitus, this is probably most safely accomplished in the operating room under MAC anesthesia.  The patient can be successfully positioned in the left lateral decubitus position using a split leg table, and he tolerated his MAC anesthesia without any difficulty.    Sarbjit Mai MD        D: 2021   T: 2021   MT: MONSTER    Name:     DEMIAN VALDEZ  MRN:      [id number]        Account:        [id number]   :      1950           Procedure Date: 2021     Document: [id number]    cc:  MD Beatrice CALVO, MSN, NP-C   Liang Diaz

## 2021-09-08 NOTE — OR NURSING
MD to bedside to update patient and wife. All questions and concerns addressed.     @1020-Discharge instructions reviewed with patient and wife, wife verbalized understanding of instructions, all questions and concerns addressed, written instructions sent home.

## 2021-09-08 NOTE — DISCHARGE INSTRUCTIONS
[facility] Colonoscopy/Flexible Sigmoidoscopy with Monitored Anesthesia Care  For 24 hours after your procedure  Sedation:  1. Get plenty of rest. A responsible adult must stay with you for at least 24 hours after you leave the hospital.   2. Do not drive or use heavy equipment. If you have weakness or tingling, don't drive or use heavy equipment until this feeling goes away.  3. Do not drink alcohol.  4. Avoid strenuous or risky activities. Ask for help when climbing stairs.   5. You may feel lightheaded. IF so, sit for a few minutes before standing. Have someone help you get up.   6. If you have nausea (feel sick to your stomach): Drink only clear liquids such as apple juice, ginger ale, broth or 7-Up. Rest may also help. Be sure to drink enough fluids. Move to a regular diet as you feel able.  7. You may have a slight fever. Call the doctor if your fever is over 100 F (37.7 C) (taken under the tongue) or lasts longer than 24 hours.  8. You may have a dry mouth, a sore throat, muscle aches or trouble sleeping. These should go away after 24 hours.  9. Do not make important or legal decisions.   Procedural:  1. You may return to your normal diet and medicines.  2. Air was placed in your colon during the exam in order to see it. Walking helps to pass the air.  3. You may take Tylenol (acetaminophen) for pain unless your doctor has told you not to.   Do not take aspirin or ibuprofen (Advil, Motrin, or other anti-inflammatory  drugs) for _____ days.  Call your doctor for any of the following  1. Unusual pain in belly or chest pain not relieved with passing air.  2. More than 1 to 2 Tablespoons of bleeding from your rectum.  3. Signs of infection (fever, growing tenderness at the surgery site, a large amount of drainage or bleeding, severe pain, foul-smelling drainage, redness, swelling).  4. It has been over 8 to 10 hours since surgery and you are still not able to urinate (pass water).  5. Headache for over 24  hours.  6. Numbness, tingling or weakness the day after surgery (if you had spinal anesthesia).  Follow-up:  ____ We took small tissue samples or polyps to study. Your doctor will call you with the results within two weeks.  If you have severe pain, bleeding, or shortness of breath, go to an emergency room.  If you have questions, call:  Endoscopy: Monday to Friday, 8 a.m. to 4:30 p.m. [phone] (We may have to call you back)  After hours: Hospital  [phone] (Ask for the GI fellow on call)      Discharge Instructions: After Your Surgery  You ve just had surgery. During surgery, you were given medicine called anesthesia to keep you relaxed and free of pain. After surgery, you may have some pain or nausea. This is common. Here are some tips for feeling better and getting well after surgery.     Stay on schedule with your medicine.   Going home  Your healthcare provider will show you how to take care of yourself when you go home. He or she will also answer your questions. Have an adult family member or friend drive you home. For the first 24 hours after your surgery:    Don't drive or use heavy equipment.    Don't make important decisions or sign legal papers.    Don't drink alcohol.    Have someone stay with you, if needed. He or she can watch for problems and help keep you safe.  Be sure to go to all follow-up visits with your healthcare provider. And rest after your surgery for as long as your healthcare provider tells you to.  Coping with pain  If you have pain after surgery, pain medicine will help you feel better. Take it as told, before pain becomes severe. Also, ask your healthcare provider or pharmacist about other ways to control pain. This might be with heat, ice, or relaxation. And follow any other instructions your surgeon or nurse gives you.  Tips for taking pain medicine  To get the best relief possible, remember these points:    Pain medicines can upset your stomach. Taking them with a  little food may help.    Most pain relievers taken by mouth need at least 20 to 30 minutes to start to work.    Don't wait till your pain becomes severe before you take your medicine. Try to time your medicine so that you can take it before starting an activity. This might be before you get dressed, go for a walk, or sit down for dinner.    Constipation is a common side effect of pain medicines. Call your healthcare provider before taking any medicines such as laxatives or stool softeners to help ease constipation. Also ask if you should skip any foods. Drinking lots of fluids and eating foods such as fruits and vegetables that are high in fiber can also help. Remember, don't take laxatives unless your surgeon has prescribed them.    Drinking alcohol and taking pain medicine can cause dizziness and slow your breathing. It can even be deadly. Don't drink alcohol while taking pain medicine.    Pain medicine can make you react more slowly to things. Don't drive or run machinery while taking pain medicine.  Your healthcare provider may tell you to take acetaminophen to help ease your pain. Ask him or her how much you are supposed to take each day. Acetaminophen or other pain relievers may interact with your prescription medicines or other over-the-counter (OTC) medicines. Some prescription medicines have acetaminophen and other ingredients. Using both prescription and OTC acetaminophen for pain can cause you to overdose. Read the labels on your OTC medicines with care. This will help you to clearly know the list of ingredients, how much to take, and any warnings. It may also help you not take too much acetaminophen. If you have questions or don't understand the information, ask your pharmacist or healthcare provider to explain it to you before you take the OTC medicine.  Managing nausea  Some people have an upset stomach after surgery. This is often because of anesthesia, pain, or pain medicine, or the stress of  surgery. These tips will help you handle nausea and eat healthy foods as you get better. If you were on a special food plan before surgery, ask your healthcare provider if you should follow it while you get better. These tips may help:    Don't push yourself to eat. Your body will tell you when to eat and how much.    Start off with clear liquids and soup. They are easier to digest.    Next try semi-solid foods, such as mashed potatoes, applesauce, and gelatin, as you feel ready.    Slowly move to solid foods. Don t eat fatty, rich, or spicy foods at first.    Don't force yourself to have 3 large meals a day. Instead eat smaller amounts more often.    Take pain medicines with a small amount of solid food, such as crackers or toast, to prevent nausea.  When to call your healthcare provider  Call your healthcare provider if:    You still have intolerable pain an hour after taking medicine. The medicine may not be strong enough.    You feel too sleepy, dizzy, or groggy. The medicine may be too strong.    You have side effects such as nausea or vomiting, or skin changes such as rash, itching, or hives. Your healthcare provider may suggest other medicines to control side effects.  Rash, itching, or hives may mean you have an allergic reaction. Report this right away. If you have trouble breathing or facial swelling, call 911 right away.  If you have obstructive sleep apnea  You were given anesthesia medicine during surgery to keep you comfortable and free of pain. After surgery, you may have more apnea spells because of this medicine and other medicines you were given. The spells may last longer than usual.   At home:    Keep using the continuous positive airway pressure (CPAP) device when you sleep. Unless your healthcare provider tells you not to, use it when you sleep, day or night. CPAP is a common device used to treat obstructive sleep apnea.    Talk with your provider before taking any pain medicine, muscle  relaxants, or sedatives. Your provider will tell you about the possible dangers of taking these medicines.  [employer] last reviewed this educational content on 3/1/2019    0248-4710 [employer]. All rights reserved. This information is not intended as a substitute for professional medical care. Always follow your healthcare professional's instructions.    Anorectal Surgery Instructions    What can I expect after anorectal surgery?  Most anorectal procedures are done as outpatient surgery, and you go home the same day as the procedure. A few surgical procedures will require that you stay in the hospital for about one to three days. No matter where the procedure is done or how long or short it takes, these recommendations will help you heal and feel more comfortable.    Medicines:  The anal area is very sensitive; you can expect to have some pain for up to 2-4 weeks after the procedure. Your doctor will give you a prescription for one or more pain medications.    Take acetaminophen (Tylenol) 650-1000 mg four times a day.   ? Take this on a regular basis (not as needed) following surgery for pain control.   ? Take the lower dose if you are >65 years old or have liver disease. The maximum dose of acetominaphen is 4000 mg a day. You can stop the acetaminophen or reduce the dose when you are feeling better.  ? It is important to realize that many narcotic pain relievers (including vicodin, percocet, tylenol #3) also have acetaminophen, and excessive doses of acetaminophen can be dangerous, so do not take these in addition to acetominaphen.  You may take narcotics that don't contain acetominaphen such as oxycodone.      ? Refilling prescriptions. If you need additional pain medication, please call the triage nurse at [phone] during normal business hours (8 a.m. to 4 p.m., Monday though Friday) or have your pharmacy fax a refill request to [phone]. If you call after hours or on the weekends, the  doctor on call may not know you personally and may not renew narcotic pain medication by phone. Call your primary care provider for all other medication refills.    Perineal care:  Tub baths:  ? If possible, take a tub bath or shower immediately after each bowel movement.   ? Baths should be take at least 3 times daily for the first week to 10 days following your procedure. You should soak in the tub for 10 to 15 minutes each time with water as warm as you can tolerate. You may also use a microphone shower head (with an extension) to shower your back-side instead of a bath.  ? Even after you go back to work, it is a good idea to sit in the tub in the morning, after returning from work, and again in the evening before bedtime.    Bleeding/Infection:  ? You can expect to have some bleeding after bowel movements, but it should stop soon after you wipe.   ? Use a wet cloth or perianal pad (Tucks or Preparation H pads) to gently wipe the area after each bowel movement.  ? Do not rub the anal area or use a lot of pressure.  ? Using a spray bottle filled with warm water helps loosen any remaining stool. Blot gently with a soft dry cloth or tissue paper.  ? Infection around the anal opening is not very common. The anal area has excellent blood supply, which helps the area to heal. Bloody discharge after bowel movements is normal and may last 2 to 4 weeks after your surgery. However, if you bleed between bowel movements and cannot get it to stop, call the triage nurse immediately [phone].    Bowel function:  Take a fiber supplement such as Metamucil, which is over the counter. It is important to drink six to eight glasses of water or juice everyday when using fiber products.    If you do not have a bowel movement after 1-2 days:  ? Take Milk of Magnesia-2 tablespoons.     ? If there are no results, repeat this or add over the counter Miralax.    ? If you still do not have results, contact the clinic.   ? If there are no  results, repeat this. Stop taking Milk of Magnesia or other laxatives if you begin to have diarrhea.    * Constipation will cause you to strain when you have a bowel movement. The hard stool will be difficult to pass, will increase pain and bleeding, and will slow down healing. Try to avoid constipation and/or diarrhea as this can make the pain and bleeding worse.    * It is important to have regular bowel movements at least every other day and to keep your stool soft. A high fiber diet, including at least four servings of fruits or vegetables daily, will help to keep your bowel movements regular and soft.    Activity:  After your procedure, there are no restrictions on your activity   ? Except restrictions because being on narcotics and in pain, such as no heavy machine operating or driving.   ? You may walk, climb stairs, ride in a car, and sit as tolerated.   ? It is helpful to avoid sitting in one position for long periods (2 or more hours).  ? After some surgeries, you may be told not to perform any lifting (more than 10 pounds) for several weeks after surgery.    When to call:  When do I need to call the doctor or triage nurse?  ? If you experience any of the problems listed here, call our triage nurse during business hours ([phone]).   ? The nurse will help you with your problem or have the doctor call you.   ? After hours and on weekends, please call the main hospital number ([phone]) and ask for the colon and rectal surgery person on call.   ? Call for:   ? Fever greater than 101 degrees   ? Chills   ? Foul-smelling drainage   ? Nausea and vomiting   ? Diarrhea - greater than 4 water stools in 24 hours   ? Constipation - no bowel movement after 3 days   ? Severe bleeding that does not stop soon after a bowel movement   ? Problems with the incision, including severe pain, swelling, or redness

## 2021-09-08 NOTE — BRIEF OP NOTE
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]  Post-operative diagnosis Same as pre-operative diagnosis    Procedure: Procedure(s):  EXAM UNDER ANESTHESIA  High resolution anoscopy with biopsies and a seton exchange  flexible sigmoidoscopy with biopsies  Surgeon: Surgeon(s) and Role:     * Sarbjit Mai MD - Primary     * Minna Justin MD - Resident - Assisting  Anesthesia: Monitor Anesthesia Care   Estimated blood loss: 10cc  Drains:  seton  Specimens:   ID Type Source Tests Collected by Time Destination   1 : Rectum and Sigmoid Biopsy Biopsy Rectum SURGICAL PATHOLOGY EXAM Sarbjit Mai MD 9/8/2021  8:25 AM    2 : Anal Biopsy Biopsy Anus SURGICAL PATHOLOGY EXAM Sarbjit Mai MD 9/8/2021  8:37 AM    3 : right lateral anal canal Tissue Other SURGICAL PATHOLOGY EXAM Sarbjit Mai MD 9/8/2021  9:01 AM    4 : left lateral anal canal Tissue Other SURGICAL PATHOLOGY EXAM Sarbjit Mai MD 9/8/2021  9:03 AM      Findings:   Few acetowhite lesions, right lateral and left lateral, biopsied.  Normal-appearing trini's pouch.  Seton in place, exchanged.  Epithelialized lesion adjacent to seton, biopsied.  Complications: None.  Implants: * No implants in log *      Minna Justin MD  Surgery Resident PGY-3  Pg [phone]

## 2021-09-08 NOTE — PROGRESS NOTES
"SPIRITUAL HEALTH SERVICES  [facility] 3C   PRE-SURGERY VISIT    Had pre-surgery visit with pt.  Provided spiritual support, prayer. Pt. Has had the procedure in the past and always likes to pray with a  before surgery to \"get squared away with the almighty.\"       Spiritual health services remains available for any follow-up or requests     __    Rabbi Montez Molina  Chaplain Resident  Pager [phone]  "

## 2021-09-08 NOTE — OR NURSING
Paged Device JOSE- Long Rainey [address] [place] [address] Patient has pacemaker. Silvana [phone]    Chitra called back, patient shouldn't need any interventions.

## 2021-09-08 NOTE — ANESTHESIA CARE TRANSFER NOTE
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    Anesthesia Type:   MAC     Note:    Oropharynx: oropharynx clear of all foreign objects and spontaneously breathing  Level of Consciousness: awake  Oxygen Supplementation: room air    Independent Airway: airway patency satisfactory and stable  Dentition: dentition unchanged  Vital Signs Stable: post-procedure vital signs reviewed and stable  Report to RN Given: handoff report given  Patient transferred to: Phase II    Handoff Report: Identifed the Patient, Identified the Reponsible Provider, Reviewed the pertinent medical history, Discussed the surgical course, Reviewed Intra-OP anesthesia mangement and issues during anesthesia, Set expectations for post-procedure period and Allowed opportunity for questions and acknowledgement of understanding      Vitals:  Vitals Value Taken Time   BP     Temp     Pulse     Resp     SpO2         Electronically Signed By: DESTINEY Suazo CRNA  September 8, 2021  9:29 AM

## 2021-09-09 LAB
PATH REPORT.COMMENTS IMP SPEC: NORMAL
PATH REPORT.FINAL DX SPEC: NORMAL
PATH REPORT.GROSS SPEC: NORMAL
PATH REPORT.MICROSCOPIC SPEC OTHER STN: NORMAL
PATH REPORT.RELEVANT HX SPEC: NORMAL
PHOTO IMAGE: NORMAL

## 2021-09-09 PROCEDURE — 88305 TISSUE EXAM BY PATHOLOGIST: CPT | Mod: 26 | Performed by: PATHOLOGY

## 2021-09-09 NOTE — ANESTHESIA POSTPROCEDURE EVALUATION
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    Anesthesia Type:  MAC    Note:  Disposition: Outpatient   Postop Pain Control: Uneventful            Sign Out: Well controlled pain   PONV: No   Neuro/Psych: Uneventful            Sign Out: Acceptable/Baseline neuro status   Airway/Respiratory: Uneventful            Sign Out: Acceptable/Baseline resp. status   CV/Hemodynamics: Uneventful            Sign Out: Acceptable CV status; No obvious hypovolemia; No obvious fluid overload   Other NRE: NONE   DID A NON-ROUTINE EVENT OCCUR? No           Last vitals:  Vitals Value Taken Time   /56 09/08/21 1030   Temp 36.8  C (98.2  F) 09/08/21 1030   Pulse 172 09/08/21 1030   Resp 18 09/08/21 1030   SpO2 98 % 09/08/21 1033   Vitals shown include unvalidated device data.    Electronically Signed By: Demario Jaeger MD  September 9, 2021  8:04 AM

## 2022-01-03 ENCOUNTER — PATIENT OUTREACH (OUTPATIENT)
Dept: SURGERY | Facility: CLINIC | Age: 72
End: 2022-01-03
Payer: MEDICARE

## 2022-08-16 DIAGNOSIS — Z87.19 HISTORY OF ANAL DYSPLASIA: Primary | ICD-10-CM

## 2022-08-19 ENCOUNTER — TELEPHONE (OUTPATIENT)
Dept: SURGERY | Facility: CLINIC | Age: 72
End: 2022-08-19

## 2022-08-19 NOTE — TELEPHONE ENCOUNTER
Health Call Center    Phone Message    May a detailed message be left on voicemail: yes     Reason for Call: Other: Per pt had seen  for many years for surgeries and for the same surgery for many years.Per pt was told back last year 2021 to schedule another surgery with whoever is  replacement. Per pt tired calling Kayla but no luck. Please call pt back to schedule. Thank you!     Action Taken: Message routed to:  [facility] KATHERINE    Travel Screening: Not Applicable

## 2022-08-22 NOTE — TELEPHONE ENCOUNTER
Case Request received to schedule:    Patient Name: Anselmo Rainey   MRN: [id number]   Case#: [id number]   Surgeon(s) and Role:      * Andre Cedillo MD - Primary   Date requested: * No dates entered *   [facility]   Procedure(s):   EXAM UNDER ANESTHESIA, ANUS, high resolution anoscopy, flexible sigmoidoscopy (N/A)   High resolution anoscopy (N/A)   SIGMOIDOSCOPY, FLEXIBLE (N/A)      Additional Instructions for the Case  Multi-surgeon case:  Yes Beatrice Jiang NP   Please schedule patient's surgery with Dr. Cedillo as 2nd case on 10/20 on the [place]. Thanks!  Time in minutes:  60  Anesthesia: MAC  Prep: 2 fleets  Pre-Op: PAC vs PCP  Labs: no  WOC: no  Special equipment: colposcope  Special Instructions: [facility]

## 2022-10-06 NOTE — TELEPHONE ENCOUNTER
"On 10/6/2022:  Spoke with patient via phone, confirmed the following scheduling, then sent Surgery Packet and Pre-op History and Physical form to patient via MyChart and Mail including related scheduling information and prep instructions:     Required: Yes, you will need a  18 years or older to drive you home from your procedure as well as stay with you for 24 hours after your procedure    Surgery: Exam under anesthesia, anus, high resolution anoscopy    Date: Thursday October 20, 2022  Surgeon: Dr. Andre Cedillo    Time: You will receive a call 1-3 days prior to your surgery with your finalized surgery and arrival time.     Location:     [facility]      [facility] [address])      [place] [address]      [phone]      [email or web address]     Upcoming Appointments:     Pre-operative Physical appointment:   - Clinic appointment to be scheduled by you, and completed within 30 days before surgery (you had mentioned that [facility] not be [facility]? If so, please let us know where you end up scheduling it so that we can get the office visit records).    Pre-operative COVID-19 Home Antigen Test--Instructions:  1. Take a COVID Home Antigen Test 1-2 days before surgery  2. Use your phone to take a photo of the results  3. Show that photo to the admitting nurse on surgery date     *If you do not want to take a home test, then please schedule a COVID PCR Test in a Lab to be completed within 4 days before surgery, with results received before your surgery date. If you go this route, please let us know where the test is scheduled.    Preparation: 2 Fleet Enemas (See \"Day of Surgery\")    Kayla Cook  [facility]ri[facility]-op Coordinator  [place]  Direct Phone: [phone]      "

## 2022-10-11 ENCOUNTER — TRANSFERRED RECORDS (OUTPATIENT)
Dept: HEALTH INFORMATION MANAGEMENT | Facility: CLINIC | Age: 72
End: 2022-10-11

## 2022-10-11 ENCOUNTER — TRANSFERRED RECORDS (OUTPATIENT)
Dept: MULTI SPECIALTY CLINIC | Facility: CLINIC | Age: 72
End: 2022-10-11

## 2022-10-11 LAB
ALT SERPL-CCNC: 14 U/L (ref 13–61)
AST SERPL-CCNC: 12 U/L (ref 15–37)
CREATININE (EXTERNAL): 1.2 MG/DL (ref 0.7–1.2)
GLUCOSE (EXTERNAL): 455 MG/DL (ref 74–106)
INR (EXTERNAL): 1.1 (ref 0.8–1.1)
POTASSIUM (EXTERNAL): 4.5 MMOL/L (ref 3.5–5)

## 2022-10-19 ENCOUNTER — ANESTHESIA EVENT (OUTPATIENT)
Dept: SURGERY | Facility: CLINIC | Age: 72
End: 2022-10-19
Payer: COMMERCIAL

## 2022-10-19 RX ORDER — LOSARTAN POTASSIUM 100 MG/1
100 TABLET ORAL DAILY
COMMUNITY

## 2022-10-19 ASSESSMENT — ENCOUNTER SYMPTOMS: SEIZURES: 1

## 2022-10-19 ASSESSMENT — COPD QUESTIONNAIRES: COPD: 1

## 2022-10-19 NOTE — OR NURSING
Rickey Contreras MD Rumsey, Diane, RN  I recommend he take it per usual.           Previous Messages     ----- Message -----   From: Francisca Braxton RN   Sent: 10/19/2022  11:01 AM CDT   To: Rickey Contreras MD   Subject: Losartan                                         Dr. Contreras,     Please see this patient's H and p recommendations. It looks like the physician wanted him to hold ACE's and ARB's but looks like he has a history of CHF, CAD , EF 55-60. He takes Losartan.     Do you recommend that he hold his Losartan on the DOS?     Please advise     Thank-you

## 2022-10-19 NOTE — ANESTHESIA PREPROCEDURE EVALUATION
Anesthesia Pre-Procedure Evaluation    Patient: Anselmo Rainey   MRN: [id number] : 1950        Procedure : Procedure(s):  EXAM UNDER ANESTHESIA, ANUS  High resolution anoscopy  SIGMOIDOSCOPY, FLEXIBLE          Past Medical History:   Diagnosis Date     Congestive heart failure, unspecified      COPD (chronic obstructive pulmonary disease) (H)      Coronary artery disease      Diabetes (H)      Gastro-oesophageal reflux disease      Heart disease      History of blood transfusion      History of rectal bleeding      Hypertension      Ileostomy in place (H)      Ischemic colitis (H)      Obese      Pacemaker      PONV (postoperative nausea and vomiting)      Psoriasis      PTSD (post-traumatic stress disorder)      Seizures (H)      Sleep apnea     no cpap     Stented coronary artery     2 STENTS      Past Surgical History:   Procedure Laterality Date     ANOSCOPY  2014    Procedure: ANOSCOPY;  Surgeon: Sarbjit Mai MD;  [facility]     ANOSCOPY Right 2019    Procedure: Exam Under Anesthesia, Microscopy and fulguration of dysplagia, biopsy of right buttock lesion;  Surgeon: aSrbjit Mai MD;  [facility]     ANOSCOPY N/A 2020    Procedure: EXAM UNDER ANESTHESIA with debridement and biopsy of post-anal ulcer, high definition anal Microscopy with biopsy and fulguration of dysplagia;  Surgeon: Sarbjit Mai MD;  [facility]     ANOSCOPY N/A 2021    Procedure: High resolution anoscopy with biopsies and a seton exchange;  Surgeon: Sarbjit Mai MD;  [facility]     BIOPSY ANAL N/A 10/22/2014    Procedure: BIOPSY ANAL;  Surgeon: Sarbjit Mai MD;  [facility]     COLONOSCOPY       EXAM UNDER ANESTHESIA ANUS  2014    Procedure: EXAM UNDER ANESTHESIA ANUS;  Surgeon: Sarbjit Mai MD;  [facility]     EXAM UNDER ANESTHESIA ANUS N/A 10/22/2014    Procedure: EXAM UNDER ANESTHESIA ANUS;  Surgeon: Sarbjit Mai MD;  [facility]     EXAM UNDER  ANESTHESIA ANUS N/A 4/26/2017    Procedure: EXAM UNDER ANESTHESIA ANUS;  Anal Exam with Anal Microscopy and Biopsies ;  Surgeon: Sarbjit Mai MD;  [facility]     EXAM UNDER ANESTHESIA ANUS N/A 8/30/2017    Procedure: EXAM UNDER ANESTHESIA ANUS;  Examination Under anesthesia, Anal Microscopy With Biopsies, fulguration ;  Surgeon: Sarbjit Mai MD;  [facility]     EXAM UNDER ANESTHESIA ANUS N/A 3/14/2018    Procedure: EXAM UNDER ANESTHESIA ANUS;  Anal Examination Under Anesthesia, Anal Microscopy fulgeration of dysplasia;  Surgeon: Sarbjit Mai MD;  [facility]     EXAM UNDER ANESTHESIA ANUS N/A 9/8/2021    Procedure: EXAM UNDER ANESTHESIA;  Surgeon: Sarbjit Mai MD;  [facility]     EXAM UNDER ANESTHESIA RECTUM N/A 6/8/2015    Procedure: EXAM UNDER ANESTHESIA RECTUM;  Surgeon: Sarbjit Mai MD;  [facility]     HC PERIANAL BIOPSY      with emergency surgery for post excision or post biopsy hemorrhage     ILEOSTOMY       ILEOSTOMY       MICROSCOPY ANAL N/A 10/22/2014    Procedure: MICROSCOPY ANAL;  Surgeon: Sarbjit Mai MD;  [facility]     MICROSCOPY ANAL N/A 6/8/2015    Procedure: MICROSCOPY ANAL;  Surgeon: Sarbjit Mai MD;  [facility]     MICROSCOPY ANAL N/A 4/26/2017    Procedure: MICROSCOPY ANAL;;  Surgeon: Sarbjit Mai MD;  [facility]     MICROSCOPY ANAL N/A 8/30/2017    Procedure: MICROSCOPY ANAL;;  Surgeon: Sarbjit Mai MD;  [facility]     MICROSCOPY ANAL N/A 3/14/2018    Procedure: MICROSCOPY ANAL;;  Surgeon: Sarbjit Mai MD;  [facility]     MICROSCOPY ANAL N/A 10/10/2018    Procedure: MICROSCOPY ANAL;  Anal Exam, Anal Microscopy, Fulgeration of Dysplagia;  Surgeon: Sarbjit Mai MD;  [facility]     SIGMOIDOSCOPY FLEXIBLE  5/28/2014    Procedure: SIGMOIDOSCOPY FLEXIBLE;  Surgeon: Sarbjit Mai MD;  [facility]     SIGMOIDOSCOPY FLEXIBLE N/A 9/2/2020    Procedure: SIGMOIDOSCOPY, FLEXIBLE with biopsies;  Surgeon: Sergei  MD Sarbjit;  [facility]     SIGMOIDOSCOPY FLEXIBLE N/A 2021    Procedure: flexible sigmoidoscopy with biopsies;  Surgeon: Sarbjit Mai MD;  [facility]     STENT       [facility] LAP,SURG,COLECTOMY,TOTAL,W/PROCTECTOMY        Allergies   Allergen Reactions     Cephalexin Difficulty breathing     Gemfibrozil Other (See Comments)     unknown     Levofloxacin Difficulty breathing     Throat tightens and he gets a spotty rash     Lisinopril Cough      Social History     Tobacco Use     Smoking status: Former     Types: Cigarettes     Quit date: 1998     Years since quittin.9     Smokeless tobacco: Never     Tobacco comments:     Quit in    Substance Use Topics     Alcohol use: No      Wt Readings from Last 1 Encounters:   21 125.7 kg (277 lb 1.9 oz)        Anesthesia Evaluation   Pt has had prior anesthetic. Type: MAC and General.    No history of anesthetic complications       ROS/MED HX  ENT/Pulmonary:     (+) sleep apnea, doesn't use CPAP, COPD,     Neurologic:     (+) seizures,     Cardiovascular:     (+) hypertension--CAD ---CHF etiology: HFrEF Last EF: 55% pacemaker, Reason placed: 2nd Degree Mobitz I Block. - Patient is dependent on pacemaker. Previous cardiac testing   Echo: Date:  Results:  Final Conclusion Previous Study: 1/10/2014    Visually Estimated EF: 55-60%    Technically difficult study - contrast was used to enhance endocardial definition due to   suboptimal image quality.    Normal LV size and systolic function with estimated ejection fraction of 55-60%.    Dyskinetic apical septal wall possibly due to ventricular pacing.    No significant valvular heart disease noted.    Dilated IVC size, >50% collapse with respiration. RA pressure estimate 8  mmHg.    When compared to the previous echo report of 1/10/2014, there has been no major change.     Stress Test: Date: Results:    ECG Reviewed: Date: Results:  V-paced  Cath: Date: Results:   (-) ICD   METS/Exercise  Tolerance: >4 METS    Hematologic:  - neg hematologic  ROS     Musculoskeletal:  - neg musculoskeletal ROS     GI/Hepatic:     (+) Inflammatory bowel disease (Crohn's),  (-) GERD   Renal/Genitourinary:     (+) renal disease, type: CRI, Pt does not require dialysis,     Endo:     (+) type II DM, Last HgA1c: 8.9, date: 8/21, Using insulin, Diabetic complications: nephropathy neuropathy retinopathy. Obesity,     Psychiatric/Substance Use:     (+) psychiatric history other (comment) (PTSD)     Infectious Disease:  - neg infectious disease ROS     Malignancy:       Other:            Physical Exam    Airway        Mallampati: III   TM distance: > 3 FB   Neck ROM: full   Mouth opening: > 3 cm    Respiratory Devices and Support         Dental  no notable dental history         Cardiovascular             Pulmonary                   OUTSIDE LABS:  CBC:   Lab Results   Component Value Date    HGB 12.6 (L) 06/21/2019     BMP:   Lab Results   Component Value Date    POTASSIUM 4.1 09/02/2020    POTASSIUM 4.4 06/21/2019    CR 1.29 (H) 09/02/2020    CR 1.09 06/21/2019     (H) 09/08/2021     (H) 09/08/2021     COAGS: No results found for: PTT, INR, FIBR  POC:   Lab Results   Component Value Date     (H) 09/02/2020     HEPATIC: No results found for: ALBUMIN, PROTTOTAL, ALT, AST, GGT, ALKPHOS, BILITOTAL, BILIDIRECT, SWATI  OTHER: No results found for: PH, LACT, A1C, SHIMON, PHOS, MAG, LIPASE, AMYLASE, TSH, T4, T3, CRP, SED    Anesthesia Plan    ASA Status:  3   NPO Status:  NPO Appropriate    Anesthesia Type: MAC.     - Reason for MAC: chronic cardiopulmonary disease, immobility needed, straight local not clinically adequate   Induction: Intravenous.   Maintenance: TIVA.   Techniques and Equipment:       - Drips/Meds: Dexmed. bolus     Consents    Anesthesia Plan(s) and associated risks, benefits, and realistic alternatives discussed. Questions answered and patient/representative(s) expressed understanding.    -  Discussed:     - Discussed with:  Patient      - Extended Intubation/Ventilatory Support Discussed: No.      - Patient is DNR/DNI Status: No    Use of blood products discussed: No .     Postoperative Care    Pain management: IV analgesics, Multi-modal analgesia, Oral pain medications.   PONV prophylaxis: Ondansetron (or other 5HT-3)     Comments:                Yennifer Roberts MD

## 2022-10-20 ENCOUNTER — HOSPITAL ENCOUNTER (OUTPATIENT)
Facility: CLINIC | Age: 72
Discharge: HOME | End: 2022-10-20
Attending: SURGERY | Admitting: SURGERY
Payer: COMMERCIAL

## 2022-10-20 ENCOUNTER — TRANSCRIBE ORDERS (OUTPATIENT)
Dept: OTHER | Age: 72
End: 2022-10-20

## 2022-10-20 ENCOUNTER — ANESTHESIA (OUTPATIENT)
Dept: SURGERY | Facility: CLINIC | Age: 72
End: 2022-10-20
Payer: COMMERCIAL

## 2022-10-20 VITALS
SYSTOLIC BLOOD PRESSURE: 116 MMHG | DIASTOLIC BLOOD PRESSURE: 64 MMHG | RESPIRATION RATE: 18 BRPM | OXYGEN SATURATION: 97 % | WEIGHT: 278.66 LBS | HEIGHT: 67 IN | HEART RATE: 67 BPM | BODY MASS INDEX: 43.74 KG/M2 | TEMPERATURE: 97.5 F

## 2022-10-20 DIAGNOSIS — C44.590: Primary | ICD-10-CM

## 2022-10-20 DIAGNOSIS — Z87.19 HISTORY OF ANAL DYSPLASIA: Primary | ICD-10-CM

## 2022-10-20 LAB
GLUCOSE BLDC GLUCOMTR-MCNC: 192 MG/DL (ref 70–99)
GLUCOSE BLDC GLUCOMTR-MCNC: 215 MG/DL (ref 70–99)

## 2022-10-20 PROCEDURE — 710N000010 HC RECOVERY PHASE 1, LEVEL 2, PER MIN: Performed by: SURGERY

## 2022-10-20 PROCEDURE — 45330 DIAGNOSTIC SIGMOIDOSCOPY: CPT | Performed by: SURGERY

## 2022-10-20 PROCEDURE — 250N000011 HC RX IP 250 OP 636: Performed by: STUDENT IN AN ORGANIZED HEALTH CARE EDUCATION/TRAINING PROGRAM

## 2022-10-20 PROCEDURE — 258N000003 HC RX IP 258 OP 636: Performed by: STUDENT IN AN ORGANIZED HEALTH CARE EDUCATION/TRAINING PROGRAM

## 2022-10-20 PROCEDURE — 250N000009 HC RX 250: Performed by: STUDENT IN AN ORGANIZED HEALTH CARE EDUCATION/TRAINING PROGRAM

## 2022-10-20 PROCEDURE — 370N000017 HC ANESTHESIA TECHNICAL FEE, PER MIN: Performed by: SURGERY

## 2022-10-20 PROCEDURE — 272N000001 HC OR GENERAL SUPPLY STERILE: Performed by: SURGERY

## 2022-10-20 PROCEDURE — 250N000011 HC RX IP 250 OP 636: Performed by: SURGERY

## 2022-10-20 PROCEDURE — 710N000012 HC RECOVERY PHASE 2, PER MINUTE: Performed by: SURGERY

## 2022-10-20 PROCEDURE — 250N000025 HC SEVOFLURANE, PER MIN: Performed by: SURGERY

## 2022-10-20 PROCEDURE — 88312 SPECIAL STAINS GROUP 1: CPT | Mod: 26 | Performed by: PATHOLOGY

## 2022-10-20 PROCEDURE — 46910 DESTRUCTION ANAL LESION(S): CPT | Performed by: SURGERY

## 2022-10-20 PROCEDURE — 88313 SPECIAL STAINS GROUP 2: CPT | Mod: 26 | Performed by: PATHOLOGY

## 2022-10-20 PROCEDURE — 360N000075 HC SURGERY LEVEL 2, PER MIN: Performed by: SURGERY

## 2022-10-20 PROCEDURE — 999N000141 HC STATISTIC PRE-PROCEDURE NURSING ASSESSMENT: Performed by: SURGERY

## 2022-10-20 PROCEDURE — 250N000013 HC RX MED GY IP 250 OP 250 PS 637: Performed by: SURGERY

## 2022-10-20 PROCEDURE — 88342 IMHCHEM/IMCYTCHM 1ST ANTB: CPT | Mod: 26 | Performed by: PATHOLOGY

## 2022-10-20 PROCEDURE — 88305 TISSUE EXAM BY PATHOLOGIST: CPT | Mod: 26 | Performed by: PATHOLOGY

## 2022-10-20 PROCEDURE — 250N000009 HC RX 250: Performed by: SURGERY

## 2022-10-20 PROCEDURE — 88305 TISSUE EXAM BY PATHOLOGIST: CPT | Mod: TC | Performed by: SURGERY

## 2022-10-20 PROCEDURE — 250N000013 HC RX MED GY IP 250 OP 250 PS 637: Performed by: STUDENT IN AN ORGANIZED HEALTH CARE EDUCATION/TRAINING PROGRAM

## 2022-10-20 RX ORDER — ONDANSETRON 2 MG/ML
4 INJECTION INTRAMUSCULAR; INTRAVENOUS EVERY 30 MIN PRN
Status: DISCONTINUED | OUTPATIENT
Start: 2022-10-20 | End: 2022-10-20 | Stop reason: HOSPADM

## 2022-10-20 RX ORDER — LIDOCAINE 40 MG/G
CREAM TOPICAL
Status: DISCONTINUED | OUTPATIENT
Start: 2022-10-20 | End: 2022-10-20 | Stop reason: HOSPADM

## 2022-10-20 RX ORDER — SODIUM CHLORIDE, SODIUM LACTATE, POTASSIUM CHLORIDE, CALCIUM CHLORIDE 600; 310; 30; 20 MG/100ML; MG/100ML; MG/100ML; MG/100ML
INJECTION, SOLUTION INTRAVENOUS CONTINUOUS
Status: DISCONTINUED | OUTPATIENT
Start: 2022-10-20 | End: 2022-10-20 | Stop reason: HOSPADM

## 2022-10-20 RX ORDER — ONDANSETRON 4 MG/1
4 TABLET, ORALLY DISINTEGRATING ORAL
Status: DISCONTINUED | OUTPATIENT
Start: 2022-10-20 | End: 2022-10-20 | Stop reason: HOSPADM

## 2022-10-20 RX ORDER — ACETIC ACID 5 %
LIQUID (ML) MISCELLANEOUS PRN
Status: DISCONTINUED | OUTPATIENT
Start: 2022-10-20 | End: 2022-10-20 | Stop reason: HOSPADM

## 2022-10-20 RX ORDER — MEPERIDINE HYDROCHLORIDE 25 MG/ML
12.5 INJECTION INTRAMUSCULAR; INTRAVENOUS; SUBCUTANEOUS
Status: DISCONTINUED | OUTPATIENT
Start: 2022-10-20 | End: 2022-10-20 | Stop reason: HOSPADM

## 2022-10-20 RX ORDER — ACETAMINOPHEN 325 MG/1
650 TABLET ORAL
Status: DISCONTINUED | OUTPATIENT
Start: 2022-10-20 | End: 2022-10-20 | Stop reason: HOSPADM

## 2022-10-20 RX ORDER — BUPIVACAINE HYDROCHLORIDE 2.5 MG/ML
INJECTION, SOLUTION INFILTRATION; PERINEURAL PRN
Status: DISCONTINUED | OUTPATIENT
Start: 2022-10-20 | End: 2022-10-20 | Stop reason: HOSPADM

## 2022-10-20 RX ORDER — ONDANSETRON 4 MG/1
4 TABLET, ORALLY DISINTEGRATING ORAL EVERY 30 MIN PRN
Status: DISCONTINUED | OUTPATIENT
Start: 2022-10-20 | End: 2022-10-20 | Stop reason: HOSPADM

## 2022-10-20 RX ORDER — OXYCODONE HYDROCHLORIDE 5 MG/1
5 TABLET ORAL EVERY 6 HOURS PRN
Qty: 10 TABLET | Refills: 0 | Status: SHIPPED | OUTPATIENT
Start: 2022-10-20

## 2022-10-20 RX ORDER — PROPOFOL 10 MG/ML
INJECTION, EMULSION INTRAVENOUS PRN
Status: DISCONTINUED | OUTPATIENT
Start: 2022-10-20 | End: 2022-10-20

## 2022-10-20 RX ORDER — ONDANSETRON 4 MG/1
4 TABLET, ORALLY DISINTEGRATING ORAL EVERY 8 HOURS PRN
Qty: 10 TABLET | Refills: 0 | Status: SHIPPED | OUTPATIENT
Start: 2022-10-20

## 2022-10-20 RX ORDER — OXYCODONE HYDROCHLORIDE 5 MG/1
5 TABLET ORAL EVERY 4 HOURS PRN
Status: DISCONTINUED | OUTPATIENT
Start: 2022-10-20 | End: 2022-10-20 | Stop reason: HOSPADM

## 2022-10-20 RX ORDER — ACETAMINOPHEN 325 MG/1
650 TABLET ORAL EVERY 4 HOURS PRN
Qty: 30 TABLET | Refills: 0 | Status: SHIPPED | OUTPATIENT
Start: 2022-10-20

## 2022-10-20 RX ORDER — PROPOFOL 10 MG/ML
INJECTION, EMULSION INTRAVENOUS CONTINUOUS PRN
Status: DISCONTINUED | OUTPATIENT
Start: 2022-10-20 | End: 2022-10-20

## 2022-10-20 RX ORDER — FENTANYL CITRATE 50 UG/ML
INJECTION, SOLUTION INTRAMUSCULAR; INTRAVENOUS PRN
Status: DISCONTINUED | OUTPATIENT
Start: 2022-10-20 | End: 2022-10-20

## 2022-10-20 RX ORDER — LIDOCAINE HYDROCHLORIDE 20 MG/ML
INJECTION, SOLUTION INFILTRATION; PERINEURAL PRN
Status: DISCONTINUED | OUTPATIENT
Start: 2022-10-20 | End: 2022-10-20

## 2022-10-20 RX ORDER — FENTANYL CITRATE 50 UG/ML
25 INJECTION, SOLUTION INTRAMUSCULAR; INTRAVENOUS EVERY 5 MIN PRN
Status: DISCONTINUED | OUTPATIENT
Start: 2022-10-20 | End: 2022-10-20 | Stop reason: HOSPADM

## 2022-10-20 RX ORDER — ACETAMINOPHEN 325 MG/1
975 TABLET ORAL ONCE
Status: DISCONTINUED | OUTPATIENT
Start: 2022-10-20 | End: 2022-10-20 | Stop reason: HOSPADM

## 2022-10-20 RX ORDER — HYDROMORPHONE HCL IN WATER/PF 6 MG/30 ML
0.2 PATIENT CONTROLLED ANALGESIA SYRINGE INTRAVENOUS EVERY 5 MIN PRN
Status: DISCONTINUED | OUTPATIENT
Start: 2022-10-20 | End: 2022-10-20 | Stop reason: HOSPADM

## 2022-10-20 RX ORDER — ACETAMINOPHEN 325 MG/1
975 TABLET ORAL ONCE
Status: COMPLETED | OUTPATIENT
Start: 2022-10-20 | End: 2022-10-20

## 2022-10-20 RX ORDER — DEXMEDETOMIDINE HYDROCHLORIDE 4 UG/ML
INJECTION, SOLUTION INTRAVENOUS PRN
Status: DISCONTINUED | OUTPATIENT
Start: 2022-10-20 | End: 2022-10-20

## 2022-10-20 RX ORDER — ONDANSETRON 2 MG/ML
4 INJECTION INTRAMUSCULAR; INTRAVENOUS ONCE
Status: COMPLETED | OUTPATIENT
Start: 2022-10-20 | End: 2022-10-20

## 2022-10-20 RX ORDER — FENTANYL CITRATE 50 UG/ML
25 INJECTION, SOLUTION INTRAMUSCULAR; INTRAVENOUS
Status: DISCONTINUED | OUTPATIENT
Start: 2022-10-20 | End: 2022-10-20 | Stop reason: HOSPADM

## 2022-10-20 RX ADMIN — PROPOFOL 100 MG: 10 INJECTION, EMULSION INTRAVENOUS at 10:10

## 2022-10-20 RX ADMIN — PHENYLEPHRINE HYDROCHLORIDE 100 MCG: 10 INJECTION INTRAVENOUS at 10:33

## 2022-10-20 RX ADMIN — OXYCODONE HYDROCHLORIDE 5 MG: 5 TABLET ORAL at 13:02

## 2022-10-20 RX ADMIN — PHENYLEPHRINE HYDROCHLORIDE 100 MCG: 10 INJECTION INTRAVENOUS at 10:24

## 2022-10-20 RX ADMIN — PHENYLEPHRINE HYDROCHLORIDE 200 MCG: 10 INJECTION INTRAVENOUS at 10:16

## 2022-10-20 RX ADMIN — ONDANSETRON 4 MG: 2 INJECTION INTRAMUSCULAR; INTRAVENOUS at 10:46

## 2022-10-20 RX ADMIN — PROPOFOL 100 MCG/KG/MIN: 10 INJECTION, EMULSION INTRAVENOUS at 10:04

## 2022-10-20 RX ADMIN — Medication 40 MCG: at 10:01

## 2022-10-20 RX ADMIN — PROPOFOL 100 MG: 10 INJECTION, EMULSION INTRAVENOUS at 10:04

## 2022-10-20 RX ADMIN — FENTANYL CITRATE 25 MCG: 50 INJECTION, SOLUTION INTRAMUSCULAR; INTRAVENOUS at 11:37

## 2022-10-20 RX ADMIN — FENTANYL CITRATE 25 MCG: 50 INJECTION, SOLUTION INTRAMUSCULAR; INTRAVENOUS at 11:30

## 2022-10-20 RX ADMIN — HYDROMORPHONE HYDROCHLORIDE 0.2 MG: 0.2 INJECTION, SOLUTION INTRAMUSCULAR; INTRAVENOUS; SUBCUTANEOUS at 11:50

## 2022-10-20 RX ADMIN — SODIUM CHLORIDE, POTASSIUM CHLORIDE, SODIUM LACTATE AND CALCIUM CHLORIDE: 600; 310; 30; 20 INJECTION, SOLUTION INTRAVENOUS at 09:53

## 2022-10-20 RX ADMIN — HYDROMORPHONE HYDROCHLORIDE 0.2 MG: 0.2 INJECTION, SOLUTION INTRAMUSCULAR; INTRAVENOUS; SUBCUTANEOUS at 11:45

## 2022-10-20 RX ADMIN — LIDOCAINE HYDROCHLORIDE 60 MG: 20 INJECTION, SOLUTION INFILTRATION; PERINEURAL at 10:04

## 2022-10-20 RX ADMIN — ACETAMINOPHEN 975 MG: 325 TABLET, FILM COATED ORAL at 08:51

## 2022-10-20 RX ADMIN — FENTANYL CITRATE 50 MCG: 50 INJECTION, SOLUTION INTRAMUSCULAR; INTRAVENOUS at 10:04

## 2022-10-20 ASSESSMENT — ACTIVITIES OF DAILY LIVING (ADL)
ADLS_ACUITY_SCORE: 22

## 2022-10-20 NOTE — OR NURSING
Sent text to surgical team regarding pt's request for pain meds at home.  Sent text to Yennifer Roberts.

## 2022-10-20 NOTE — ANESTHESIA CARE TRANSFER NOTE
Patient: Anselmo Rainey    Procedure: Procedure(s):  EXAM UNDER ANESTHESIA, ANUS, Anal Dilation  High resolution anoscopy  SIGMOIDOSCOPY, FLEXIBLE       Diagnosis: 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]  Diagnosis Additional Information: No value filed.    Anesthesia Type:   MAC     Note:    Oropharynx: oropharynx clear of all foreign objects and spontaneously breathing  Level of Consciousness: awake  Oxygen Supplementation: face mask  Level of Supplemental Oxygen (L/min / FiO2): 6  Independent Airway: airway patency satisfactory and stable  Dentition: dentition unchanged  Vital Signs Stable: post-procedure vital signs reviewed and stable  Report to RN Given: handoff report given  Patient transferred to: PACU    Handoff Report: Identifed the Patient, Identified the Reponsible Provider, Reviewed the pertinent medical history, Discussed the surgical course, Reviewed Intra-OP anesthesia mangement and issues during anesthesia, Set expectations for post-procedure period and Allowed opportunity for questions and acknowledgement of understanding      Vitals:  Vitals Value Taken Time   BP 93/39 10/20/22 1100   Temp 35.8  C (96.44  F) 10/20/22 1103   Pulse 68 10/20/22 1103   Resp 17 10/20/22 1103   SpO2 100 % 10/20/22 1103   Vitals shown include unvalidated device data.    Electronically Signed By: Yennifer Roberts MD  October 20, 2022  11:04 AM

## 2022-10-20 NOTE — DISCHARGE INSTRUCTIONS
Anorectal Surgery Instructions    What can I expect after anorectal surgery?  Most anorectal procedures are done as outpatient surgery, and you go home the same day as the procedure. A few surgical procedures will require that you stay in the hospital for about one to three days. No matter where the procedure is done or how long or short it takes, these recommendations will help you heal and feel more comfortable.    Medicines:  The anal area is very sensitive; you can expect to have some pain for up to 2-4 weeks after the procedure.     Take acetaminophen (Tylenol) 650-1000 mg four times a day.   Take this on a regular basis (not as needed) following surgery for pain control.   Take the lower dose if you are >65 years old or have liver disease. The maximum dose of acetaminophen is 4000 mg a day. You can stop the acetaminophen or reduce the dose when you are feeling better.  It is important to realize that many narcotic pain relievers (including vicodin, percocet, tylenol #3) also have acetaminophen, and excessive doses of acetaminophen can be dangerous, so do not take these in addition to acetaminophen.  You may take narcotics that don't contain acetaminophen such as oxycodone.      Refilling prescriptions. If you need additional pain medication, please call the triage nurse at [phone] during normal business hours (8 a.m. to 4 p.m., Monday though Friday).  If you call after hours or on the weekends, the doctor on call may not know you personally and may not renew narcotic pain medication by phone. Call your primary care provider for all other medication refills.    Perineal care:  Tub baths:  If possible, take a tub bath immediately after each bowel movement.   Baths should be take at least 3 times daily for the first week to 10 days following your procedure. You should soak in the tub for 10 to 15 minutes each time with water as warm as you can tolerate.   Even after you go back to work, it is a good idea to  sit in the tub in the morning, after returning from work, and again in the evening before bedtime.    Bleeding/Infection:  You can expect to have some bleeding after bowel movements, but it should stop soon after you wipe.   Use a wet cloth or perianal pad (Tucks or Preparation H pads) to gently wipe the area after each bowel movement.  Do not rub the anal area or use a lot of pressure.  Using a spray bottle filled with warm water helps loosen any remaining stool. Blot gently with a soft dry cloth or tissue paper.  Infection around the anal opening is not very common. The anal area has excellent blood supply, which helps the area to heal. Bloody discharge after bowel movements is normal and may last 2 to 4 weeks after your surgery. However, if you bleed between bowel movements and cannot get it to stop, call the triage nurse immediately [phone].    Bowel function:  Take a fiber supplement such as Metamucil or Citrucel, which is over the counter. It is important to drink six to eight glasses of water or juice everyday when using fiber products.    If you do not have a bowel movement after 1-2 days:  Take Milk of Magnesia-2 tablespoons.     If there are no results, repeat this or add over the counter Miralax.    If you still do not have results, contact the clinic.   If there are no results, repeat this. Stop taking Milk of Magnesia or other laxatives if you begin to have diarrhea.    * Constipation will cause you to strain when you have a bowel movement. The hard stool will be difficult to pass, will increase pain and bleeding, and will slow down healing.  Try to avoid constipation and/or diarrhea as this can make the pain and bleeding worse.    * It is important to have regular bowel movements at least every other day and to keep your stool soft.  A high fiber diet, including at least four servings of fruits or vegetables daily, will help to keep your bowel movements regular and soft.    Activity:  After your  procedure, there are no restrictions on your activity   except restrictions surrounding being on narcotics and in pain, such as no heavy machine operating or driving.   You may walk, climb stairs, ride in a car, and sit as tolerated.   It is helpful to avoid sitting in one position for long periods (2 or more hours).  After some surgeries, you may be told not to perform any lifting (more than 10 pounds) for several weeks after surgery.    When to call:  When do I need to call the doctor or triage nurse?  If you experience any of the problems listed here, call our triage nurse during business hours ([phone]).   The nurse will help you with your problem or have the doctor call you.   After hours and on weekends, please call the main hospital number ([phone]) and ask for the colon and rectal surgery person on call.   Some is available to help you 24 hours a day, seven days a week.  Call for:   ? Fever greater than 101 degrees   ? Chills   ? Foul-smelling drainage   ? Nausea and vomiting   ? Diarrhea - greater than 3 water stools in 24 hours   ? Constipation - no bowel movement after 3 days   ? Severe bleeding that does not stop soon after a bowel movement   ? Problems with the incision, including increased pain, swelling, or redness    [facility], [place]  Same-Day Surgery   Adult Discharge Orders & Instructions     For 24 hours after surgery    Get plenty of rest.  A responsible adult must stay with you for at least 24 hours after you leave the hospital.   Do not drive or use heavy equipment.  If you have weakness or tingling, don't drive or use heavy equipment until this feeling goes away.  Do not drink alcohol.  Avoid strenuous or risky activities.  Ask for help when climbing stairs.   You may feel lightheaded.  IF so, sit for a few minutes before standing.  Have someone help you get up.   If you have nausea (feel sick to your stomach): Drink only clear liquids such as apple  juice, ginger ale, broth or 7-Up.  Rest may also help.  Be sure to drink enough fluids.  Move to a regular diet as you feel able.  You may have a slight fever. Call the doctor if your fever is over 100 F (37.7 C) (taken under the tongue) or lasts longer than 24 hours.  You may have a dry mouth, a sore throat, muscle aches or trouble sleeping.  These should go away after 24 hours.  Do not make important or legal decisions.   Call your doctor for any of the followin.  Signs of infection (fever, growing tenderness at the surgery site, a large amount of drainage or bleeding, severe pain, foul-smelling drainage, redness, swelling).    2. It has been over 8 to 10 hours since surgery and you are still not able to urinate (pass water).    3.  Headache for over 24 hours.    4.  Numbness, tingling or weakness the day after surgery (if you had spinal anesthesia).  To contact a doctor, call our triage nurse during business hours ([phone]). or:    '   [phone] and ask for the resident on call for General Surgery (answered 24 hours a day)  '   Emergency Department:    [facility]: [phone]       (TTY for hearing impaired: [phone])    [facility]: [phone]       (TTY for hearing impaired: [phone])

## 2022-10-20 NOTE — OP NOTE
[facility] Colorectal Surgery Operative Report  October 20, 2022      PREOPERATIVE DIAGNOSIS:  1. History of high grade anal epithelial neoplasia  2. History of low grade anal epithelial neoplasia  3. History of anorectal abscess  4. Seton in place  5. History of emergent colectomy and Angela's pouch  6. History of anal ulcer  7. Suspicion for inflammatory bowel disease with complication    POSTOPERATIVE DIAGNOSIS:   1. History of high grade anal epithelial neoplasia  2. History of low grade anal epithelial neoplasia  3. History of anorectal abscess  4. Seton in place  5. History of emergent colectomy and Angela's pouch  6. History of anal ulcer  7. Suspicion for inflammatory bowel disease with complication  8. Anal stenosis    PROCEDURE:  1. Evaluation under anesthesia.  2. High resolution anoscopy  3. Anal biopsy  4. Fulguration of possible high grade dysplasia  5. Flexible sigmoidoscopy  6. Anal dilation    ANESTHESIA: General anesthesia + 30 ml 0.25% bupivicaine    SURGEON:  Andre Cedillo MD, PhD     ASSISTANT(S): Beatrice Jiang NP and Kimberlyn Alarcon MD - colorectal surgery fellow    INDICATIONS FOR PROCEDURE  Anselmo Rainey is a 72 year old male who presented with the following history:    1. Prior emergent colectomy, end ileostomy, Angela's pouch for colitis of unclear etiology - suspected IBD of acute onset  2. 2014 - anal ulcer biopsied by Dr Sarbjit Mai showing AIN3  3. 2015 - HRA, no dysplasia  4. 2017 - HRA - AIN 3  5. 2019 - HRA - AIN1  6. 2020 - HRA - AIN 1  7. 2021 - HRA - AIN 1  8. He also has a known anal ulcer; this was the site of his prior AIN 3 but subsequently no high grade dysplasia for many years.    I thoroughly discussed the risks, benefits, and alternatives of operative treatment with the patient and he/she agreed to proceed.    General risks related to anorectal surgery were reviewed with the patient. These include, but are not limited to urinary retention,  "abscess, infection, bleeding, chronic pain, anal stenosis, fistula, fissure, and fecal incontinence.     OPERATIVE PROCEDURE: After obtaining informed consent, the patient was brought to the operating room and placed in the lithotomy position. Appropriate preoperative mechanical deep venous thrombosis prophylaxis was administered. General anesthesia was gently induced. Bilateral lower extremity pneumatic compression devices were applied and all pressure points were cushioned.  After a \"time-out\" was performed, a total of 30 ml of Bupivacaine 0.25% without epinephrine was injected as a pudendal block.     Anal cytology was not obtained with Dacron swab. Digital anal rectal exam was performed with very tight anus - moderately stenotic.  I was able to insert my index finger.  This causes a fair amount of bleeding.  There was a yellow vessel loop seton in place and it looked great. Dilute acetic acid soak was completed for 2 minutes. Lubricant was used to insert the anoscope. Performed high resolution microscopy using the colposcope. The dentate line was viewed in its entirety, although bleeding from the anal dilation made visualization difficult.     Given the prior low grade dysplasia, but not seeing anything concerning on anoscopy, I performed random biopsies and fulguration at:    1. Right anal canal  2. Left anal canal  3. Posterior perianal skin    The perianal area was inspected after acetic acid soak. The findings noted were as above.     The flexible sigmoidoscope was then inserted.  The entire remnant rectum and distal sigmoid colon was visualized.  The mucosa appeared healthy with maybe a slight look of diversion proctitis to it.  There were no masses, bleeding, or other mucosal abnormalities.  The anal canal was bloody, both from the dilation and the biopsies.      PLAN: Will follow up with patient with results of biopsy. If low grade dysplasia or normal, unclear if further HRA is warranted.  If high-grade " follow up in 3 months.    He should also follow up with me in clinic if he does not note improvement from the anal dilation.  He may need further anal dilations in the OR.    COMPLICATIONS: none, immediately.    ESTIMATED BLOOD LOSS: 15 mL.    SPECIMEN(S): see above    DRAINS/TUBES: The yellow vessel loop seton remains in place.    OPERATIVE FINDINGS: see above    DISPOSITION: PACU.    Andre Cedillo MD, PhD  Division of Colon and Rectal Surgery  [facility]  [phone]      CC:  [facility] Surgery billing.  Beatrice Jiang NP.

## 2022-10-20 NOTE — ANESTHESIA PROCEDURE NOTES
Airway       Patient location during procedure: OR  Staff -        Anesthesiologist:  Elana Costello MD       Resident/Fellow: Yennifer Roberts MD       Performed By: residentIndications and Patient Condition       Indications for airway management: airway protection, hemodynamic instability and respiratory insufficiency       Induction type:intravenous       Mask difficulty assessment: 1 - vent by mask    Final Airway Details       Final airway type: supraglottic airway    Supraglottic Airway Details        Type: LMA       Brand: I-Gel       LMA size: 5    Post intubation assessment        Placement verified by: capnometry and chest rise        Number of attempts at approach: 1       Number of other approaches attempted: 0       Secured with: pink tape       Ease of procedure: easy       Dentition: Intact and Unchanged

## 2022-10-20 NOTE — ANESTHESIA POSTPROCEDURE EVALUATION
Patient: Anselmo Rainey    Procedure: Procedure(s):  EXAM UNDER ANESTHESIA, ANUS, Anal Dilation  High resolution anoscopy  SIGMOIDOSCOPY, FLEXIBLE       Anesthesia Type:  MAC    Note:  Disposition: Outpatient   Postop Pain Control: Uneventful            Sign Out: Well controlled pain   PONV: No   Neuro/Psych: Uneventful            Sign Out: Acceptable/Baseline neuro status   Airway/Respiratory: Uneventful            Sign Out: Acceptable/Baseline resp. status   CV/Hemodynamics: Uneventful            Sign Out: Acceptable CV status; No obvious hypovolemia; No obvious fluid overload   Other NRE: NONE   DID A NON-ROUTINE EVENT OCCUR? No           Last vitals:  Vitals Value Taken Time   /55 10/20/22 1215   Temp 36  C (96.8  F) 10/20/22 1222   Pulse 70 10/20/22 1223   Resp 29 10/20/22 1223   SpO2 97 % 10/20/22 1226   Vitals shown include unvalidated device data.    Electronically Signed By: Elana Costello MD  October 20, 2022  1:17 PM

## 2022-10-20 NOTE — BRIEF OP NOTE
[facility]    Brief Operative Note    Pre-operative diagnosis: 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]  Post-operative diagnosis Same as pre-operative diagnosis    Procedure: Procedure(s):  EXAM UNDER ANESTHESIA, ANUS, Anal Dilation  High resolution anoscopy  SIGMOIDOSCOPY, FLEXIBLE  Surgeon: Surgeon(s) and Role:     * Andre Cedillo MD - Primary     * Kimberlyn Alarcon MD - Fellow - Assisting  Anesthesia: MAC   Estimated Blood Loss: Minimal    Drains: None  Specimens:   ID Type Source Tests Collected by Time Destination   1 : Right Anal Canal Tissue Anus SURGICAL PATHOLOGY EXAM Andre Cedillo MD 10/20/2022 10:31 AM    2 : Left Anal Canal Tissue Anus SURGICAL PATHOLOGY EXAM Andre Cedillo MD 10/20/2022 10:33 AM    3 : Posterior Perianal Skin Tissue Anus SURGICAL PATHOLOGY EXAM Andre Cedillo MD 10/20/2022 10:38 AM      Findings:   Friable mucosa in anal canal and distal rectum, two areas biopsied, normal rectal mucosa on flex sig..  Complications: None.  Implants: * No implants in log *

## 2022-10-20 NOTE — PROGRESS NOTES
Spiritual Health Services Progress Note  [facility], [address]      Provided chaplaincy care with Long and his wife (Kunal Goode) before his surgery per his request. Long affirmed that his Oriental orthodox hillary is important for his coping. Prayer was shared for him and as he also requested, his sister (Katy).    I have no plan for follow up today.     Chaplain Suleman Alvarez, MPH, MDiv, [facility]   [phone]

## 2022-12-16 NOTE — TELEPHONE ENCOUNTER
Patient called with questions about his pathology report from his last EUA with Dr. Mai. This RN explained that he did not receive a pathology result in the mail because Dr. Mai  did not see any areas during the exam that needed to be biopsied, there were only a few small areas that needed to be cauterized during the exam. This Rn informed the patient that he needs to follow up in 6 mo for another exam. Patient verbalized understanding.    
5

## 2023-08-16 DIAGNOSIS — K62.82 ANAL DYSPLASIA: ICD-10-CM

## 2023-08-16 DIAGNOSIS — Z87.19 HISTORY OF ANAL DYSPLASIA: Primary | ICD-10-CM

## 2023-08-24 DIAGNOSIS — K60.30 ANAL FISTULA: Primary | ICD-10-CM

## 2023-08-28 NOTE — ANESTHESIA CARE TRANSFER NOTE
Patient: Anselmo CARCAMO Pitleclaura    Procedure(s):  Exam Under Anesthesia, High Resolution Anoscopy, biopsies and fulguration    Diagnosis: High Grade Dysplasia  Diagnosis Additional Information: No value filed.    Anesthesia Type:   No value filed.     Note:  Airway :Nasal Cannula  Patient transferred to:Phase II  Comments: To phase 2 on 2L O2. Alert ans exchanging well. VSS. Report given to RN.Handoff Report: Identifed the Patient, Identified the Reponsible Provider, Reviewed the pertinent medical history, Discussed the surgical course, Reviewed Intra-OP anesthesia mangement and issues during anesthesia, Set expectations for post-procedure period and Allowed opportunity for questions and acknowledgement of understanding      Vitals: (Last set prior to Anesthesia Care Transfer)    CRNA VITALS  6/21/2019 1306 - 6/21/2019 1347      6/21/2019             NIBP:  115/59    Pulse:  63    Ht Rate:  63    SpO2:  100 %                Electronically Signed By: DESTINEY Wells CRNA  June 21, 2019  1:47 PM  
Patient is critically ill, requiring critical care services.

## 2023-08-29 ENCOUNTER — TELEPHONE (OUTPATIENT)
Dept: SURGERY | Facility: CLINIC | Age: 73
End: 2023-08-29
Payer: MEDICARE

## 2023-08-29 NOTE — TELEPHONE ENCOUNTER
On 8/29/2023 at 1:22 PM:    Surgery: Exam under anesthesia, rectum, high resolution anoscopy, fulguration of anal dysplasia, biopsies, flexible sigmoidoscopy     Patient is scheduled for surgery with Dr. Andre Cedillo     Spoke with: Long    Date of Surgery: Tuesday October 31, 2023    Location: [place] OR    Informed patient they will need an adult  YES    H[facility]    Surgery packet: Mailed     Additional comments: patient prefers early surgery start time if possible    Kayla Cook  Myriam-op Coordinator  [place]  Direct Phone: [phone]

## 2023-09-27 ENCOUNTER — TELEPHONE (OUTPATIENT)
Dept: SURGERY | Facility: CLINIC | Age: 73
End: 2023-09-27
Payer: MEDICARE

## 2023-09-27 NOTE — TELEPHONE ENCOUNTER
[facility] Call Center    Phone Message    May a detailed message be left on voicemail: yes     Reason for Call: Other: Pt requesting a call back at , this is in regards to upcoming procedure on 10/31. Pt states they usually send a info packet prior to the procedure. Pt asking for a direct number to Dr Cedillo or nurse as he does not like to get transferred around,  and he dislikes talking to machines, pt states it is very frustrating. .     Action Taken: Other: clr    Travel Screening: Not Applicable

## 2023-10-02 ENCOUNTER — TELEPHONE (OUTPATIENT)
Dept: SURGERY | Facility: CLINIC | Age: 73
End: 2023-10-02
Payer: MEDICARE

## 2023-10-02 ENCOUNTER — TELEPHONE (OUTPATIENT)
Facility: CLINIC | Age: 73
End: 2023-10-02

## 2023-10-02 NOTE — TELEPHONE ENCOUNTER
[facility] Call Center    Phone Message    May a detailed message be left on voicemail: yes     Reason for Call: Other: Please call patient back to discuss upcoming surgery.     Action Taken: Message routed to:  [facility] crs    Travel Screening: Not Applicable

## 2023-10-12 LAB
CREATININE (EXTERNAL): 1.4 MG/DL (ref 0.7–1.2)
GFR ESTIMATED (EXTERNAL): 53 ML/MIN/1.73M2
GLUCOSE (EXTERNAL): 266 MG/DL (ref 70–100)
POTASSIUM (EXTERNAL): 4.1 MMOL/L (ref 3.5–5.1)

## 2023-10-19 ENCOUNTER — TRANSFERRED RECORDS (OUTPATIENT)
Dept: HEALTH INFORMATION MANAGEMENT | Facility: CLINIC | Age: 73
End: 2023-10-19
Payer: MEDICARE

## 2023-10-19 LAB
CHOLESTEROL (EXTERNAL): 230 MG/DL
HBA1C MFR BLD: 7.6 % (ref 4–6)
HDLC SERPL-MCNC: 31 MG/DL
INR (EXTERNAL): 1.6
LDL CHOLESTEROL CALCULATED (EXTERNAL): 130 MG/DL
NON HDL CHOLESTEROL (EXTERNAL): 199 MG/DL
TRIGLYCERIDES (EXTERNAL): 343 MG/DL

## 2023-10-27 RX ORDER — BUMETANIDE 1 MG/1
TABLET ORAL DAILY
COMMUNITY

## 2023-10-27 RX ORDER — WARFARIN SODIUM 5 MG/1
TABLET ORAL
COMMUNITY
Start: 2023-03-30

## 2023-10-30 ENCOUNTER — ANESTHESIA EVENT (OUTPATIENT)
Dept: SURGERY | Facility: CLINIC | Age: 73
End: 2023-10-30
Payer: COMMERCIAL

## 2023-10-30 ASSESSMENT — COPD QUESTIONNAIRES: COPD: 1

## 2023-10-30 ASSESSMENT — ENCOUNTER SYMPTOMS: SEIZURES: 1

## 2023-10-30 NOTE — ANESTHESIA PREPROCEDURE EVALUATION
Anesthesia Pre-Procedure Evaluation    Patient: Anselmo Rainey   MRN: [id number] : 1950        Procedure : Procedure(s):  EXAM UNDER ANESTHESIA, RECTUM, ANOSCOPY, HIGH RESOLUTION, FULGURATION of anal dysplasia, BIOPSIES  SIGMOIDOSCOPY, FLEXIBLE          Past Medical History:   Diagnosis Date    Congestive heart failure, unspecified     COPD (chronic obstructive pulmonary disease) (H)     Coronary artery disease     Diabetes (H)     Gastro-oesophageal reflux disease     Heart disease     History of blood transfusion     History of rectal bleeding     Hypertension     Ileostomy in place (H)     Ischemic colitis (H24)     Obese     Pacemaker     PONV (postoperative nausea and vomiting)     Psoriasis     PTSD (post-traumatic stress disorder)     Seizures (H)     Sleep apnea     no cpap    Stented coronary artery     2 STENTS      Past Surgical History:   Procedure Laterality Date    ANOSCOPY  2014    Procedure: ANOSCOPY;  Surgeon: Sarbjit Mai MD;  Location: [facility] OR    ANOSCOPY Right 2019    Procedure: Exam Under Anesthesia, Microscopy and fulguration of dysplagia, biopsy of right buttock lesion;  Surgeon: Sarbjit Mai MD;  Location: [facility] OR    ANOSCOPY N/A 2020    Procedure: EXAM UNDER ANESTHESIA with debridement and biopsy of post-anal ulcer, high definition anal Microscopy with biopsy and fulguration of dysplagia;  Surgeon: Sarbjit Mai MD;  Location: [facility] OR    ANOSCOPY N/A 2021    Procedure: High resolution anoscopy with biopsies and a seton exchange;  Surgeon: Sarbjit Mai MD;  Location: [facility] OR    ANOSCOPY N/A 10/20/2022    Procedure: High resolution anoscopy;  Surgeon: Andre Cedillo MD;  Location: [facility] OR    BIOPSY ANAL N/A 10/22/2014    Procedure: BIOPSY ANAL;  Surgeon: Sarbjit Mai MD;  Location: [facility] OR    COLONOSCOPY      EXAM UNDER ANESTHESIA ANUS  2014    Procedure: EXAM UNDER ANESTHESIA ANUS;  Surgeon: Sarbjit Mai MD;  Location: [facility]    EXAM UNDER  ANESTHESIA ANUS N/A 10/22/2014    Procedure: EXAM UNDER ANESTHESIA ANUS;  Surgeon: Sarbjit Mai MD;  [facility]: [facility]    EXAM UNDER ANESTHESIA ANUS N/A 4/26/2017    Procedure: EXAM UNDER ANESTHESIA ANUS;  Anal Exam with Anal Microscopy and Biopsies ;  Surgeon: Sarbjit Mai MD;  Location: [facility] OR    EXAM UNDER ANESTHESIA ANUS N/A 8/30/2017    Procedure: EXAM UNDER ANESTHESIA ANUS;  Examination Under anesthesia, Anal Microscopy With Biopsies, fulguration ;  Surgeon: Sarbjit Mai MD;  [facility]: [facility]    EXAM UNDER ANESTHESIA ANUS N/A 3/14/2018    Procedure: EXAM UNDER ANESTHESIA ANUS;  Anal Examination Under Anesthesia, Anal Microscopy fulgeration of dysplasia;  Surgeon: Sarbjit Mai MD;  [facility]    EXAM UNDER ANESTHESIA ANUS N/A 9/8/2021    Procedure: EXAM UNDER ANESTHESIA;  Surgeon: Sarbjit Mai MD;  [facility]    EXAM UNDER ANESTHESIA ANUS N/A 10/20/2022    Procedure: EXAM UNDER ANESTHESIA, ANUS, Anal Dilation;  Surgeon: Andre Cedillo MD;  [facility]    EXAM UNDER ANESTHESIA RECTUM N/A 6/8/2015    Procedure: EXAM UNDER ANESTHESIA RECTUM;  Surgeon: Sarbjit Mai MD;  Location: [facility] OR    HC PERIANAL BIOPSY      with emergency surgery for post excision or post biopsy hemorrhage    ILEOSTOMY      ILEOSTOMY      MICROSCOPY ANAL N/A 10/22/2014    Procedure: MICROSCOPY ANAL;  Surgeon: Sarbjit Mai MD;  Location: [facility]    MICROSCOPY ANAL N/A 6/8/2015    Procedure: MICROSCOPY ANAL;  Surgeon: Sarbjit Mai MD;  [facility]    MICROSCOPY ANAL N/A 4/26/2017    Procedure: MICROSCOPY ANAL;;  Surgeon: Sarbjit Mai MD;  [facility]: [facility]    MICROSCOPY ANAL N/A 8/30/2017    Procedure: MICROSCOPY ANAL;;  Surgeon: Sarbjit Mai MD;  [facility]: [facility]    MICROSCOPY ANAL N/A 3/14/2018    Procedure: MICROSCOPY ANAL;;  Surgeon: Sarbjit Mai MD;  [facility]: [facility]    MICROSCOPY ANAL N/A 10/10/2018    Procedure: MICROSCOPY ANAL;  Anal Exam, Anal Microscopy, Fulgeration of Dysplagia;   Surgeon: Sarbjit Mai MD;  [facility]: [facility]    SIGMOIDOSCOPY FLEXIBLE  2014    Procedure: SIGMOIDOSCOPY FLEXIBLE;  Surgeon: Sarbjit Mai MD;  [facility]: [facility]    SIGMOIDOSCOPY FLEXIBLE N/A 2020    Procedure: SIGMOIDOSCOPY, FLEXIBLE with biopsies;  Surgeon: Sarbjit Mai MD;  [facility]    SIGMOIDOSCOPY FLEXIBLE N/A 2021    Procedure: flexible sigmoidoscopy with biopsies;  Surgeon: Sarbjit Mai MD;  [facility]    SIGMOIDOSCOPY FLEXIBLE N/A 10/20/2022    Procedure: SIGMOIDOSCOPY, FLEXIBLE;  Surgeon: Andre Cedillo MD;  Location: [facility] OR    STENT      ZZC LAP,SURG,COLECTOMY,TOTAL,W/PROCTECTOMY        Allergies   Allergen Reactions    Cephalexin Difficulty breathing    Gemfibrozil Other (See Comments)     unknown    Levofloxacin Difficulty breathing     Throat tightens and he gets a spotty rash    Lisinopril Cough      Social History     Tobacco Use    Smoking status: Former     Types: Cigarettes     Quit date: 1998     Years since quittin.9    Smokeless tobacco: Never    Tobacco comments:     Quit in    Substance Use Topics    Alcohol use: No      Wt Readings from Last 1 Encounters:   10/20/22 126.4 kg (278 lb 10.6 oz)        Anesthesia Evaluation            ROS/MED HX  ENT/Pulmonary:     (+) sleep apnea, doesn't use CPAP,                       COPD,              Neurologic:     (+)       seizures,                         Cardiovascular: Comment: Date:  Results:  Final Conclusion Previous Study: 1/10/2014    Visually Estimated EF: 55-60%    Technically difficult study - contrast was used to enhance endocardial definition due to   suboptimal image quality.    Normal LV size and systolic function with estimated ejection fraction of 55-60%.    Dyskinetic apical septal wall possibly due to ventricular pacing.    No significant valvular heart disease noted.    Dilated IVC size, >50% collapse with respiration. RA pressure estimate 8  mmHg.    When compared to the  "previous echo report of 1/10/2014, there has been no major change.    (+)  hypertension- -  CAD -  - -      CHF  Last EF: 55-60%      pacemaker, Reason placed: 2nd dergree Mobitz 1 block. Pacemaker dependent.                       METS/Exercise Tolerance:     Hematologic:  - neg hematologic  ROS     Musculoskeletal:  - neg musculoskeletal ROS     GI/Hepatic:     (+)       Inflammatory bowel disease (chron's; s/p illeostomy),             Renal/Genitourinary:     (+) renal disease, type: CRI, Pt does not require dialysis,           Endo:     (+)  type II DM (requires insulin),             Obesity,       Psychiatric/Substance Use:     (+) psychiatric history other (comment) (PTSD)       Infectious Disease:       Malignancy:       Other:            Physical Exam    Airway        Mallampati: III   TM distance: > 3 FB   Neck ROM: limited   Mouth opening: < 3 cm    Respiratory Devices and Support         Dental       (+) Minor Abnormalities - some fillings, tiny chips      Cardiovascular   cardiovascular exam normal          Pulmonary   pulmonary exam normal        breath sounds clear to auscultation               OUTSIDE LABS:  CBC:   Lab Results   Component Value Date    HGB 12.6 (L) 06/21/2019     BMP:   Lab Results   Component Value Date    POTASSIUM 4.1 09/02/2020    POTASSIUM 4.4 06/21/2019    CR 1.29 (H) 09/02/2020    CR 1.09 06/21/2019     (H) 10/20/2022     (H) 10/20/2022     COAGS:   Lab Results   Component Value Date    INR 1.10 10/11/2022     POC:   Lab Results   Component Value Date     (H) 09/02/2020     HEPATIC: No results found for: \"ALBUMIN\", \"PROTTOTAL\", \"ALT\", \"AST\", \"GGT\", \"ALKPHOS\", \"BILITOTAL\", \"BILIDIRECT\", \"SWATI\"  OTHER: No results found for: \"PH\", \"LACT\", \"A1C\", \"SHIMON\", \"PHOS\", \"MAG\", \"LIPASE\", \"AMYLASE\", \"TSH\", \"T4\", \"T3\", \"CRP\", \"SED\"    Anesthesia Plan    ASA Status:  3    NPO Status:  NPO Appropriate    Anesthesia Type: General.     - Airway: LMA   Induction: Intravenous. "   Maintenance: Balanced.   Techniques and Equipment:     - Lines/Monitors: BIS     Consents    Anesthesia Plan(s) and associated risks, benefits, and realistic alternatives discussed. Questions answered and patient/representative(s) expressed understanding.     - Discussed: Risks, Benefits and Alternatives for BOTH SEDATION and the PROCEDURE were discussed     - Discussed with:  Patient      - Extended Intubation/Ventilatory Support Discussed: Yes.      - Patient is DNR/DNI Status: No     Use of blood products discussed: Yes.     - Discussed with: Patient.     - Consented: consented to blood products     Postoperative Care    Pain management: IV analgesics, Oral pain medications, Multi-modal analgesia.   PONV prophylaxis: Ondansetron (or other 5HT-3), Dexamethasone or Solumedrol     Comments:                Quincy Jimenez MD

## 2023-10-31 ENCOUNTER — ANESTHESIA (OUTPATIENT)
Dept: SURGERY | Facility: CLINIC | Age: 73
End: 2023-10-31
Payer: COMMERCIAL

## 2023-10-31 ENCOUNTER — HOSPITAL ENCOUNTER (OUTPATIENT)
Facility: CLINIC | Age: 73
Discharge: HOME | End: 2023-10-31
Attending: SURGERY | Admitting: SURGERY
Payer: COMMERCIAL

## 2023-10-31 VITALS
WEIGHT: 276.9 LBS | RESPIRATION RATE: 16 BRPM | TEMPERATURE: 97.7 F | HEIGHT: 67 IN | SYSTOLIC BLOOD PRESSURE: 152 MMHG | HEART RATE: 72 BPM | OXYGEN SATURATION: 97 % | DIASTOLIC BLOOD PRESSURE: 88 MMHG | BODY MASS INDEX: 43.46 KG/M2

## 2023-10-31 DIAGNOSIS — Z87.19 HISTORY OF ANAL DYSPLASIA: Primary | ICD-10-CM

## 2023-10-31 LAB
GLUCOSE BLDC GLUCOMTR-MCNC: 141 MG/DL (ref 70–99)
GLUCOSE BLDC GLUCOMTR-MCNC: 154 MG/DL (ref 70–99)
GLUCOSE BLDC GLUCOMTR-MCNC: 223 MG/DL (ref 70–99)
HBA1C MFR BLD: 8.3 %

## 2023-10-31 PROCEDURE — 46910 DESTRUCTION ANAL LESION(S): CPT | Performed by: SURGERY

## 2023-10-31 PROCEDURE — 250N000013 HC RX MED GY IP 250 OP 250 PS 637: Performed by: SURGERY

## 2023-10-31 PROCEDURE — 36415 COLL VENOUS BLD VENIPUNCTURE: CPT | Performed by: ANESTHESIOLOGY

## 2023-10-31 PROCEDURE — 250N000011 HC RX IP 250 OP 636

## 2023-10-31 PROCEDURE — 82962 GLUCOSE BLOOD TEST: CPT

## 2023-10-31 PROCEDURE — 710N000012 HC RECOVERY PHASE 2, PER MINUTE: Performed by: SURGERY

## 2023-10-31 PROCEDURE — 370N000017 HC ANESTHESIA TECHNICAL FEE, PER MIN: Performed by: SURGERY

## 2023-10-31 PROCEDURE — 83036 HEMOGLOBIN GLYCOSYLATED A1C: CPT | Performed by: ANESTHESIOLOGY

## 2023-10-31 PROCEDURE — 250N000025 HC SEVOFLURANE, PER MIN: Performed by: SURGERY

## 2023-10-31 PROCEDURE — 360N000075 HC SURGERY LEVEL 2, PER MIN: Performed by: SURGERY

## 2023-10-31 PROCEDURE — 250N000009 HC RX 250: Performed by: ANESTHESIOLOGY

## 2023-10-31 PROCEDURE — 272N000001 HC OR GENERAL SUPPLY STERILE: Performed by: SURGERY

## 2023-10-31 PROCEDURE — 258N000003 HC RX IP 258 OP 636

## 2023-10-31 PROCEDURE — 999N000141 HC STATISTIC PRE-PROCEDURE NURSING ASSESSMENT: Performed by: SURGERY

## 2023-10-31 PROCEDURE — 710N000009 HC RECOVERY PHASE 1, LEVEL 1, PER MIN: Performed by: SURGERY

## 2023-10-31 PROCEDURE — 250N000011 HC RX IP 250 OP 636: Mod: JZ | Performed by: SURGERY

## 2023-10-31 PROCEDURE — 88305 TISSUE EXAM BY PATHOLOGIST: CPT | Mod: 26 | Performed by: PATHOLOGY

## 2023-10-31 PROCEDURE — 88305 TISSUE EXAM BY PATHOLOGIST: CPT | Mod: TC | Performed by: SURGERY

## 2023-10-31 PROCEDURE — 250N000009 HC RX 250

## 2023-10-31 PROCEDURE — 45331 SIGMOIDOSCOPY AND BIOPSY: CPT | Performed by: SURGERY

## 2023-10-31 PROCEDURE — 250N000009 HC RX 250: Performed by: SURGERY

## 2023-10-31 RX ORDER — ACETAMINOPHEN 325 MG/1
975 TABLET ORAL ONCE
Status: DISCONTINUED | OUTPATIENT
Start: 2023-10-31 | End: 2023-10-31 | Stop reason: HOSPADM

## 2023-10-31 RX ORDER — ACETIC ACID 5 %
LIQUID (ML) MISCELLANEOUS PRN
Status: DISCONTINUED | OUTPATIENT
Start: 2023-10-31 | End: 2023-10-31 | Stop reason: HOSPADM

## 2023-10-31 RX ORDER — LIDOCAINE 40 MG/G
CREAM TOPICAL
Status: DISCONTINUED | OUTPATIENT
Start: 2023-10-31 | End: 2023-10-31 | Stop reason: HOSPADM

## 2023-10-31 RX ORDER — ONDANSETRON 2 MG/ML
4 INJECTION INTRAMUSCULAR; INTRAVENOUS EVERY 30 MIN PRN
Status: DISCONTINUED | OUTPATIENT
Start: 2023-10-31 | End: 2023-10-31 | Stop reason: HOSPADM

## 2023-10-31 RX ORDER — DEXAMETHASONE SODIUM PHOSPHATE 4 MG/ML
INJECTION, SOLUTION INTRA-ARTICULAR; INTRALESIONAL; INTRAMUSCULAR; INTRAVENOUS; SOFT TISSUE PRN
Status: DISCONTINUED | OUTPATIENT
Start: 2023-10-31 | End: 2023-10-31

## 2023-10-31 RX ORDER — DEXTROSE MONOHYDRATE 100 MG/ML
INJECTION, SOLUTION INTRAVENOUS CONTINUOUS PRN
Status: DISCONTINUED | OUTPATIENT
Start: 2023-10-31 | End: 2023-10-31 | Stop reason: HOSPADM

## 2023-10-31 RX ORDER — IODINE AND POTASSIUM IODIDE 50; 100 MG/ML; MG/ML
LIQUID ORAL PRN
Status: DISCONTINUED | OUTPATIENT
Start: 2023-10-31 | End: 2023-10-31 | Stop reason: HOSPADM

## 2023-10-31 RX ORDER — PROPOFOL 10 MG/ML
INJECTION, EMULSION INTRAVENOUS PRN
Status: DISCONTINUED | OUTPATIENT
Start: 2023-10-31 | End: 2023-10-31

## 2023-10-31 RX ORDER — HYDROMORPHONE HCL IN WATER/PF 6 MG/30 ML
0.2 PATIENT CONTROLLED ANALGESIA SYRINGE INTRAVENOUS EVERY 5 MIN PRN
Status: DISCONTINUED | OUTPATIENT
Start: 2023-10-31 | End: 2023-10-31 | Stop reason: HOSPADM

## 2023-10-31 RX ORDER — ACETAMINOPHEN 325 MG/1
975 TABLET ORAL ONCE
Status: COMPLETED | OUTPATIENT
Start: 2023-10-31 | End: 2023-10-31

## 2023-10-31 RX ORDER — BUPIVACAINE HYDROCHLORIDE 2.5 MG/ML
INJECTION, SOLUTION EPIDURAL; INFILTRATION; INTRACAUDAL PRN
Status: DISCONTINUED | OUTPATIENT
Start: 2023-10-31 | End: 2023-10-31 | Stop reason: HOSPADM

## 2023-10-31 RX ORDER — FENTANYL CITRATE-0.9 % NACL/PF 10 MCG/ML
PLASTIC BAG, INJECTION (ML) INTRAVENOUS PRN
Status: DISCONTINUED | OUTPATIENT
Start: 2023-10-31 | End: 2023-10-31

## 2023-10-31 RX ORDER — ONDANSETRON 4 MG/1
4 TABLET, ORALLY DISINTEGRATING ORAL EVERY 30 MIN PRN
Status: DISCONTINUED | OUTPATIENT
Start: 2023-10-31 | End: 2023-10-31 | Stop reason: HOSPADM

## 2023-10-31 RX ORDER — OXYCODONE HYDROCHLORIDE 10 MG/1
10 TABLET ORAL
Status: DISCONTINUED | OUTPATIENT
Start: 2023-10-31 | End: 2023-10-31 | Stop reason: HOSPADM

## 2023-10-31 RX ORDER — NICOTINE POLACRILEX 4 MG
15-30 LOZENGE BUCCAL
Status: DISCONTINUED | OUTPATIENT
Start: 2023-10-31 | End: 2023-10-31 | Stop reason: HOSPADM

## 2023-10-31 RX ORDER — FENTANYL CITRATE 50 UG/ML
25 INJECTION, SOLUTION INTRAMUSCULAR; INTRAVENOUS EVERY 5 MIN PRN
Status: DISCONTINUED | OUTPATIENT
Start: 2023-10-31 | End: 2023-10-31 | Stop reason: HOSPADM

## 2023-10-31 RX ORDER — SODIUM CHLORIDE, SODIUM LACTATE, POTASSIUM CHLORIDE, CALCIUM CHLORIDE 600; 310; 30; 20 MG/100ML; MG/100ML; MG/100ML; MG/100ML
INJECTION, SOLUTION INTRAVENOUS CONTINUOUS
Status: DISCONTINUED | OUTPATIENT
Start: 2023-10-31 | End: 2023-10-31 | Stop reason: HOSPADM

## 2023-10-31 RX ORDER — DEXMEDETOMIDINE HYDROCHLORIDE 4 UG/ML
INJECTION, SOLUTION INTRAVENOUS PRN
Status: DISCONTINUED | OUTPATIENT
Start: 2023-10-31 | End: 2023-10-31

## 2023-10-31 RX ORDER — LIDOCAINE HYDROCHLORIDE 20 MG/ML
INJECTION, SOLUTION INFILTRATION; PERINEURAL PRN
Status: DISCONTINUED | OUTPATIENT
Start: 2023-10-31 | End: 2023-10-31

## 2023-10-31 RX ORDER — HYDROMORPHONE HCL IN WATER/PF 6 MG/30 ML
0.4 PATIENT CONTROLLED ANALGESIA SYRINGE INTRAVENOUS EVERY 5 MIN PRN
Status: DISCONTINUED | OUTPATIENT
Start: 2023-10-31 | End: 2023-10-31 | Stop reason: HOSPADM

## 2023-10-31 RX ORDER — OXYCODONE HYDROCHLORIDE 5 MG/1
5 TABLET ORAL EVERY 6 HOURS PRN
Qty: 12 TABLET | Refills: 0 | Status: SHIPPED | OUTPATIENT
Start: 2023-10-31 | End: 2023-11-03

## 2023-10-31 RX ORDER — FENTANYL CITRATE 50 UG/ML
50 INJECTION, SOLUTION INTRAMUSCULAR; INTRAVENOUS EVERY 5 MIN PRN
Status: DISCONTINUED | OUTPATIENT
Start: 2023-10-31 | End: 2023-10-31 | Stop reason: HOSPADM

## 2023-10-31 RX ORDER — FENTANYL CITRATE 50 UG/ML
INJECTION, SOLUTION INTRAMUSCULAR; INTRAVENOUS PRN
Status: DISCONTINUED | OUTPATIENT
Start: 2023-10-31 | End: 2023-10-31

## 2023-10-31 RX ORDER — ONDANSETRON 2 MG/ML
4 INJECTION INTRAMUSCULAR; INTRAVENOUS ONCE
Status: COMPLETED | OUTPATIENT
Start: 2023-10-31 | End: 2023-10-31

## 2023-10-31 RX ORDER — OXYCODONE HYDROCHLORIDE 5 MG/1
5 TABLET ORAL
Status: DISCONTINUED | OUTPATIENT
Start: 2023-10-31 | End: 2023-10-31 | Stop reason: HOSPADM

## 2023-10-31 RX ORDER — DEXTROSE MONOHYDRATE 25 G/50ML
25-50 INJECTION, SOLUTION INTRAVENOUS
Status: DISCONTINUED | OUTPATIENT
Start: 2023-10-31 | End: 2023-10-31 | Stop reason: HOSPADM

## 2023-10-31 RX ADMIN — Medication 250 MCG: at 08:20

## 2023-10-31 RX ADMIN — Medication 50 MCG: at 09:01

## 2023-10-31 RX ADMIN — DEXAMETHASONE SODIUM PHOSPHATE 4 MG: 4 INJECTION, SOLUTION INTRA-ARTICULAR; INTRALESIONAL; INTRAMUSCULAR; INTRAVENOUS; SOFT TISSUE at 08:36

## 2023-10-31 RX ADMIN — SODIUM CHLORIDE, POTASSIUM CHLORIDE, SODIUM LACTATE AND CALCIUM CHLORIDE: 600; 310; 30; 20 INJECTION, SOLUTION INTRAVENOUS at 10:04

## 2023-10-31 RX ADMIN — FENTANYL CITRATE 25 MCG: 50 INJECTION, SOLUTION INTRAMUSCULAR; INTRAVENOUS at 09:55

## 2023-10-31 RX ADMIN — SODIUM CHLORIDE, POTASSIUM CHLORIDE, SODIUM LACTATE AND CALCIUM CHLORIDE: 600; 310; 30; 20 INJECTION, SOLUTION INTRAVENOUS at 08:13

## 2023-10-31 RX ADMIN — PROPOFOL 170 MG: 10 INJECTION, EMULSION INTRAVENOUS at 08:20

## 2023-10-31 RX ADMIN — ONDANSETRON 4 MG: 2 INJECTION INTRAMUSCULAR; INTRAVENOUS at 08:50

## 2023-10-31 RX ADMIN — FENTANYL CITRATE 50 MCG: 50 INJECTION INTRAMUSCULAR; INTRAVENOUS at 08:20

## 2023-10-31 RX ADMIN — INSULIN HUMAN 2 UNITS/HR: 1 INJECTION, SOLUTION INTRAVENOUS at 07:52

## 2023-10-31 RX ADMIN — FENTANYL CITRATE 25 MCG: 50 INJECTION, SOLUTION INTRAMUSCULAR; INTRAVENOUS at 10:05

## 2023-10-31 RX ADMIN — DEXMEDETOMIDINE 16 MCG: 100 INJECTION, SOLUTION, CONCENTRATE INTRAVENOUS at 09:08

## 2023-10-31 RX ADMIN — ACETAMINOPHEN 975 MG: 325 TABLET, FILM COATED ORAL at 07:29

## 2023-10-31 RX ADMIN — LIDOCAINE HYDROCHLORIDE 100 MG: 20 INJECTION, SOLUTION INFILTRATION; PERINEURAL at 08:20

## 2023-10-31 ASSESSMENT — ACTIVITIES OF DAILY LIVING (ADL)
ADLS_ACUITY_SCORE: 35

## 2023-10-31 NOTE — OP NOTE
[facility] Colorectal Surgery Operative Report  October 31, 2023    PREOPERATIVE DIAGNOSIS:  1. History of high grade anal epithelial neoplasia  2. History of low grade anal epithelial neoplasia  3. History of anorectal abscess  4. Seton in place  5. History of emergent colectomy and Angela's pouch  6. History of anal ulcer  7. Suspicion for inflammatory bowel disease with complication     POSTOPERATIVE DIAGNOSIS:   1. History of high grade anal epithelial neoplasia  2. History of low grade anal epithelial neoplasia  3. History of anorectal abscess  4. Seton in place  5. History of emergent colectomy and Angela's pouch  6. History of anal ulcer  7. Suspicion for inflammatory bowel disease with complication  8. Anal stenosis     PROCEDURE:  1. Evaluation under anesthesia.  2. High resolution anoscopy  3. Anal biopsy  4. Fulguration of possible high grade dysplasia  5. Flexible sigmoidoscopy with biopsy  6. Anal dilation     ANESTHESIA: General anesthesia + 30 ml 0.25% bupivacaine     SURGEON:  Andre Cedillo MD, PhD      ASSISTANT(S): None.     INDICATIONS FOR PROCEDURE  Anselmo Rainey is a 72 year old male who presented with the following history:     1. Prior emergent colectomy, end ileostomy, Angela's pouch for colitis of unclear etiology - suspected IBD of acute onset  2. 2014 - anal ulcer biopsied by Dr Sarbjit Mai showing AIN3  3. 2015 - HRA, no dysplasia  4. 2017 - HRA - AIN 3  5. 2019 - HRA - AIN1  6. 2020 - HRA - AIN 1  7. 2021 - HRA - AIN 1  8. 2022 - HRA with Andre Cedillo MD, PhD - AIN1     I thoroughly discussed the risks, benefits, and alternatives of operative treatment with the patient and he/she agreed to proceed.     General risks related to anorectal surgery were reviewed with the patient. These include, but are not limited to urinary retention, abscess, infection, bleeding, chronic pain, anal stenosis, fistula, fissure, and fecal incontinence.     OPERATIVE PROCEDURE:  "After obtaining informed consent, the patient was brought to the operating room and placed in the lithotomy position. Appropriate preoperative mechanical deep venous thrombosis prophylaxis was administered. Genereal anesthesia was gently induced. Bilateral lower extremity pneumatic compression devices were applied and all pressure points were cushioned.     After a \"time-out\" was performed, a total of 30 ml of Bupivacaine 0.25% without epinephrine was injected as a pudendal block.     Anal cytology was not obtained with Dacron swab. Digital anal rectal exam was performed with very tight anus that was dilated - this caused some bleeding. Dilute acetic acid soak was completed for 2 minutes. Lubricant was used to insert the anoscope. Performed high resolution microscopy using the colposcope. The dentate line was viewed in its entirety. Abnormal areas noted were as follows:    Left lateral anal canal - biopsy and fulguration     Biopsy was obtained. Fulguration was performed.     The perianal area was inspected after acetic acid soak. The findings noted were as above.    At this point a flexible sigmoidoscope was inserted into the anus.  We reached the site of his prior resection which was at about 30 cm from the anal verge.  Overall the mucosa appeared normal - perhaps a little inflamed from diversion colitis but there was no contact bleeding and no polyps of mucosal abnormalities noted.  I performed a random biopsy for histology labeled \"sigmoid colon random biopsy\".  Retroflexion was not performed.     The patient tolerated the procedures well.    PLAN: Will follow up with patient with results of biopsy. If low grade dysplasia or normal, follow up in 12 months for repeat high resolution anoscopy. If high grade dysplasia 6 months.     COMPLICATIONS: none, immediately.    ESTIMATED BLOOD LOSS: 10 mL - mostly from the anal dilation    SPECIMEN(S):     -left lateral anal canal  -sigmoid colon random biopsy.    OPERATIVE " FINDINGS: see above    DISPOSITION: PACU.    Andre Cedillo MD, PhD  Division of Colon and Rectal Surgery  [facility]      CC:  [facility] Surgery billing.  Beatrice Jiang NP.

## 2023-10-31 NOTE — ANESTHESIA CARE TRANSFER NOTE
Patient: Anselmo Rainey    Procedure: Procedure(s):  EXAM UNDER ANESTHESIA, RECTUM, ANOSCOPY, HIGH RESOLUTION, FULGURATION of anal dysplasia, BIOPSIES  SIGMOIDOSCOPY, FLEXIBLE with biposies, anal dilation       Diagnosis: Anal dysplasia [K62.82]  Diagnosis Additional Information: No value filed.    Anesthesia Type:   General     Note:    Oropharynx: oropharynx clear of all foreign objects and spontaneously breathing  Level of Consciousness: awake and drowsy  Oxygen Supplementation: face mask  Level of Supplemental Oxygen (L/min / FiO2): 6  Independent Airway: airway patency satisfactory and stable  Dentition: dentition unchanged  Vital Signs Stable: post-procedure vital signs reviewed and stable  Report to RN Given: handoff report given  Patient transferred to: PACU    Handoff Report: Identifed the Patient, Identified the Reponsible Provider, Reviewed the pertinent medical history, Discussed the surgical course, Reviewed Intra-OP anesthesia mangement and issues during anesthesia, Set expectations for post-procedure period and Allowed opportunity for questions and acknowledgement of understanding      Vitals:  Vitals Value Taken Time   /77 10/31/23 0935   Temp     Pulse 78 10/31/23 0940   Resp 16 10/31/23 0940   SpO2 100 % 10/31/23 0940   Vitals shown include unfiled device data.    Electronically Signed By: Quincy Jimenez MD  October 31, 2023  9:42 AM

## 2023-10-31 NOTE — PROGRESS NOTES
Paged Dr Cedillo regarding pain management at home and when to resume the coumadin.  Rx was printed and placed in patients chart and sent with patient to phase 2.  Becca Bejarano informed of incoming script.  Doctor Cedillo informed patient at bedside to resume coumadin tomorrow.

## 2023-10-31 NOTE — ANESTHESIA PROCEDURE NOTES
Airway         Procedure Start/Stop Times: 10/31/2023 8:21 AM  Staff -        Anesthesiologist:  Marie Thompson MD       Resident/Fellow: Quincy Jimenez MD       Performed By: resident  Consent for Airway        Urgency: elective  Indications and Patient Condition       Indications for airway management: radha-procedural       Induction type:intravenous       Mask difficulty assessment: 1 - vent by mask    Final Airway Details       Final airway type: supraglottic airway    Supraglottic Airway Details        Type: LMA       Brand: I-Gel       LMA size: 5    Post intubation assessment        Placement verified by: capnometry, equal breath sounds and chest rise        Number of attempts at approach: 1       Number of other approaches attempted: 0       Secured with: cloth tape and silk tape       Ease of procedure: easy       Dentition: Intact    Medication(s) Administered   Medication Administration Time: 10/31/2023 8:21 AM

## 2023-10-31 NOTE — ANESTHESIA POSTPROCEDURE EVALUATION
Patient: Anselmo Rainey    Procedure: Procedure(s):  EXAM UNDER ANESTHESIA, RECTUM, ANOSCOPY, HIGH RESOLUTION, FULGURATION of anal dysplasia, BIOPSIES  SIGMOIDOSCOPY, FLEXIBLE with biposies, anal dilation       Anesthesia Type:  General    Note:  Disposition: Outpatient   Postop Pain Control: Uneventful            Sign Out: Well controlled pain   PONV: No   Neuro/Psych: Uneventful            Sign Out: Acceptable/Baseline neuro status   Airway/Respiratory: Uneventful            Sign Out: Acceptable/Baseline resp. status   CV/Hemodynamics: Uneventful            Sign Out: Acceptable CV status; No obvious hypovolemia; No obvious fluid overload   Other NRE: NONE   DID A NON-ROUTINE EVENT OCCUR? No           Last vitals:  Vitals Value Taken Time   /68 10/31/23 1130   Temp 36.6  C (97.9  F) 10/31/23 1045   Pulse 78 10/31/23 1130   Resp 12 10/31/23 1119   SpO2 98 % 10/31/23 1132   Vitals shown include unfiled device data.    Electronically Signed By: Petr Villagran MD  October 31, 2023  11:40 AM

## 2023-10-31 NOTE — PROGRESS NOTES
SPIRITUAL HEALTH SERVICES  [facility] 3C   PRE-SURGERY VISIT    Had pre-surgery visit with pt.  Provided spiritual support, prayer.     Apple Lerma  Chaplain Resident  Pager [phone]    * [facility] remains available 24/7 for emergent requests/referrals, either by having the switchboard page the on-call  or by entering an ASAP/STAT consult in Epic (this will also page the on-call ). Routine Epic consults receive an initial response within 24 hours.*

## 2023-10-31 NOTE — DISCHARGE INSTRUCTIONS
[facility]  Same-Day Surgery   Adult Discharge Orders & Instructions     For 24 hours after surgery    Get plenty of rest.  A responsible adult must stay with you for at least 24 hours after you leave the hospital.   Do not drive or use heavy equipment.  If you have weakness or tingling, don't drive or use heavy equipment until this feeling goes away.  Do not drink alcohol.  Avoid strenuous or risky activities.  Ask for help when climbing stairs.   You may feel lightheaded.  IF so, sit for a few minutes before standing.  Have someone help you get up.   If you have nausea (feel sick to your stomach): Drink only clear liquids such as apple juice, ginger ale, broth or 7-Up.  Rest may also help.  Be sure to drink enough fluids.  Move to a regular diet as you feel able.  You may have a slight fever. Call the doctor if your fever is over 100 F (37.7 C) (taken under the tongue) or lasts longer than 24 hours.  You may have a dry mouth, a sore throat, muscle aches or trouble sleeping.  These should go away after 24 hours.  Do not make important or legal decisions.   Call your doctor for any of the followin.  Signs of infection (fever, growing tenderness at the surgery site, a large amount of drainage or bleeding, severe pain, foul-smelling drainage, redness, swelling).    2. It has been over 8 to 10 hours since surgery and you are still not able to urinate (pass water).    3.  Headache for over 24 hours.      To contact a doctor, call Dr. [phone] or:    '   [phone] and ask for the resident on call for   [place]  (answered 24 hours a day)  '   Emergency Department:    [facility]: [phone]       (TTY for hearing impaired: [phone])    [facility]: [phone]       (TTY for hearing impaired: [phone])

## 2023-11-01 LAB
GLUCOSE BLDC GLUCOMTR-MCNC: 183 MG/DL (ref 70–99)
GLUCOSE BLDC GLUCOMTR-MCNC: 198 MG/DL (ref 70–99)
PATH REPORT.COMMENTS IMP SPEC: NORMAL
PATH REPORT.FINAL DX SPEC: NORMAL
PATH REPORT.GROSS SPEC: NORMAL
PATH REPORT.MICROSCOPIC SPEC OTHER STN: NORMAL
PATH REPORT.RELEVANT HX SPEC: NORMAL
PHOTO IMAGE: NORMAL

## 2023-11-10 ENCOUNTER — TELEPHONE (OUTPATIENT)
Dept: SURGERY | Facility: CLINIC | Age: 73
End: 2023-11-10
Payer: MEDICARE

## 2023-11-10 NOTE — TELEPHONE ENCOUNTER
[facility] Call Center    Phone Message    May a detailed message be left on voicemail: yes     Reason for Call: Other: Pt is requesting a call back please from [place] to discuss his biopsy results, he is also requesting a copy of the report. Pt also would like to discuss the small amount blood he is still seeing in his ostomy, Pt states he has no pain though. Please reach out to Pt. Thank you!      Action Taken: Message routed to:  [facility]al     Travel Screening: Not Applicable

## 2023-11-10 NOTE — TELEPHONE ENCOUNTER
Per HRA notes on 10/31 from Dr. Cedillo.    PLAN: Will follow up with patient with results of biopsy. If low grade dysplasia or normal, follow up in 12 months for repeat high resolution anoscopy. If high grade dysplasia 12 months.     Biopsy showed low grade dysplasia, AIN1 - patient will be due for HRA in OR in 12 months (October 2024)  Updated patient on recommendations.  Discussed small amount of blood and mucous per rectum. Let him know this is normal after biopsies and should dissipate shortly.   Denies any other concerns.

## 2024-04-18 NOTE — DISCHARGE INSTRUCTIONS
Anorectal Surgery Instructions    What can I expect after anorectal surgery?  Most anorectal procedures are done as outpatient surgery, and you go home the same day as the procedure. A few surgical procedures will require that you stay in the hospital for about one to three days. No matter where the procedure is done or how long or short it takes, these recommendations will help you heal and feel more comfortable.    Medicines:  The anal area is very sensitive; you can expect to have some pain for up to 2-4 weeks after the procedure. Your doctor will give you a prescription for one or more pain medications.    Take naprosyn 500 mg twice a day OR ibuprofen 600 mg four times a day     Take this on a regular basis (not as needed) following your surgery.     The drugs are best taken with food.  Do not take if it causes stomach upset or if you have a history of ulcers or gastritis. You can stop the naprosyn (or ibuprofen) or reduce the dose when you are feeling better.    DO NOT use naprosyn, ibuprofen, or other similar agents (eg. Advil or Aleve) if you have inflammatory bowel disease (Ulcerative Colitis or Crohn's disease) or if your doctor as advised you against using these medications    Take acetominaphen (Tylenol) 650-1000 mg four times a day.     Take this on a regular basis (not as needed) following surgery for pain control.     Take the lower dose if you are >65 years old or have liver disease. The maximum dose of acetominaphen is 4000 mg a day. You can stop the acetaminophen or reduce the dose when you are feeling better.    It is important to realize that many narcotic pain relievers (including vicodin, percocet, tylenol #3) also have acetaminophen, and excessive doses of acetaminophen can be dangerous, so do not take these in addition to acetominaphen.  You may take narcotics that don't contain acetominaphen such as oxycodone.      Take oxycodone AS NEEDED in addition to the acetominaphen and naprosyn.   Received a fax from [facility] in regards to Oxybutynin stating: Drug not covered by patient plan. The preferred alternative is Trospiumcltabmg, Solifenacintabmg, Tolterodinecapmger, Festoterodinetabmger, Darifenacintabmg.    To Dr. Moody to review and advise       Because narcotics have side effects (including constipation), you should reduce your use of these medications as tolerated as your pain improves.    *In general, the best strategy is to take (if you are able to tolerate it) the tylenol and naprosen on a regular basis until your pain has largely gone away. You can take the narcotic pain medicine as needed in addition to the tylenol and ibuprofen. As your pain begins to lessen, you should cut back on your narcotic use while continuing to take your regular tylenol and naprosyn doses.      Refilling prescriptions. If you need additional pain medication, please call the triage nurse at [phone] during normal business hours (8 a.m. to 4 p.m., Monday though Friday) or have your pharmacy fax a refill request to [phone]. If you call after hours or on the weekends, the doctor on call may not know you personally and may not renew narcotic pain medication by phone. Call your primary care provider for all other medication refills.    Perineal care:  Tub baths:    If possible, take a tub bath immediately after each bowel movement.     Baths should be take at least 3 times daily for the first week to 10 days following your procedure. You should soak in the tub for 10 to 15 minutes each time with water as warm as you can tolerate.     Even after you go back to work, it is a good idea to sit in the tub in the morning, after returning from work, and again in the evening before bedtime.    Bleeding/Infection:    You can expect to have some bleeding after bowel movements, but it should stop soon after you wipe.     Use a wet cloth or perianal pad (Tucks or Preparation H pads) to gently wipe the area after each bowel movement.    Do not rub the anal area or use a lot of pressure.    Using a spray bottle filled with warm water helps loosen any remaining stool. Blot gently with a soft dry cloth or tissue paper.    Infection around the anal opening is not very common. The anal  area has excellent blood supply, which helps the area to heal. Bloody discharge after bowel movements is normal and may last 2 to 4 weeks after your surgery. However, if you bleed between bowel movements and cannot get it to stop, call the triage nurse immediately [phone].    Bowel function:  Take a fiber supplement such as Metamucil, which is over the counter. It is important to drink six to eight glasses of water or juice everyday when using fiber products.    If you do not have a bowel movement after 1-2 days:    Take Milk of Magnesia-2 tablespoons.       If there are no results, repeat this or add over the counter Miralax.      If you still do not have results, contact the clinic.     If there are no results, repeat this. Stop taking Milk of Magnesia or other laxatives if you begin to have diarrhea.    * Constipation will cause you to strain when you have a bowel movement. The hard stool will be difficult to pass, will increase pain and bleeding, and will slow down healing.  Try to avoid constipation and/or diarrhea as this can make the pain and bleeding worse.    * It is important to have regular bowel movements at least every other day and to keep your stool soft.  A high fiber diet, including at least four servings of fruits or vegetables daily, will help to keep your bowel movements regular and soft.    Activity:  After your procedure, there are no restrictions on your activity     except restrictions surrounding being on narcotics and in pain, such as no heavy machine operating or driving.     You may walk, climb stairs, ride in a car, and sit as tolerated.     It is helpful to avoid sitting in one position for long periods (2 or more hours).    After some surgeries, you may be told not to perform any lifting (more than 10 pounds) for several weeks after surgery.    When to call:  When do I need to call the doctor or triage nurse?    If you experience any of the problems listed here, call our triage  nurse during business hours ([phone]).     The nurse will help you with your problem or have the doctor call you.     After hours and on weekends, please call the main hospital number ([phone]) and ask for the colon and rectal surgery person on call.     Some is available to help you 24 hours a day, seven days a week.    Call for:   ? Fever greater than 101 degrees   ? Chills   ? Foul-smelling drainage   ? Nausea and vomiting   ? Diarrhea - greater than 3 water stools in 24 hours   ? Constipation - no bowel movement after 3 days   ? Severe bleeding that does not stop soon after a bowel movement   ? Problems with the incision, including increased pain, swelling, or redness    [facility], [place]  Same-Day Surgery   Adult Discharge Orders & Instructions     For 24 hours after surgery    1. Get plenty of rest.  A responsible adult must stay with you for at least 24 hours after you leave the hospital.   2. Do not drive or use heavy equipment.  If you have weakness or tingling, don't drive or use heavy equipment until this feeling goes away.  3. Do not drink alcohol.  4. Avoid strenuous or risky activities.  Ask for help when climbing stairs.   5. You may feel lightheaded.  IF so, sit for a few minutes before standing.  Have someone help you get up.   6. If you have nausea (feel sick to your stomach): Drink only clear liquids such as apple juice, ginger ale, broth or 7-Up.  Rest may also help.  Be sure to drink enough fluids.  Move to a regular diet as you feel able.  7. You may have a slight fever. Call the doctor if your fever is over 100 F (37.7 C) (taken under the tongue) or lasts longer than 24 hours.  8. You may have a dry mouth, a sore throat, muscle aches or trouble sleeping.  These should go away after 24 hours.  9. Do not make important or legal decisions.   Call your doctor for any of the followin.  Signs of infection (fever, growing tenderness at the surgery site,  a large amount of drainage or bleeding, severe pain, foul-smelling drainage, redness, swelling).    2. It has been over 8 to 10 hours since surgery and you are still not able to urinate (pass water).    3.  Headache for over 24 hours.    To contact a doctor, call Dr Mai's office at [phone]    or:        [phone] and ask for the resident on call for Colorectal surgery (answered 24 hours a day)      Emergency Department:    [facility]: [phone]       (TTY for hearing impaired: [phone])

## 2024-04-19 ENCOUNTER — TRANSFERRED RECORDS (OUTPATIENT)
Dept: HEALTH INFORMATION MANAGEMENT | Facility: CLINIC | Age: 74
End: 2024-04-19
Payer: MEDICARE

## 2024-04-19 LAB — RETINOPATHY: POSITIVE

## 2024-04-22 ENCOUNTER — MEDICAL CORRESPONDENCE (OUTPATIENT)
Dept: HEALTH INFORMATION MANAGEMENT | Facility: CLINIC | Age: 74
End: 2024-04-22
Payer: MEDICARE

## 2024-04-25 ENCOUNTER — MEDICAL CORRESPONDENCE (OUTPATIENT)
Dept: HEALTH INFORMATION MANAGEMENT | Facility: CLINIC | Age: 74
End: 2024-04-25
Payer: MEDICARE

## 2024-05-01 DIAGNOSIS — H35.3130 BILATERAL NONEXUDATIVE AGE-RELATED MACULAR DEGENERATION: Primary | ICD-10-CM

## 2024-05-01 DIAGNOSIS — H35.89 OTHER SPECIFIED RETINAL DISORDERS: ICD-10-CM

## 2024-05-01 DIAGNOSIS — E11.3399: ICD-10-CM

## 2024-05-13 ENCOUNTER — ALLIED HEALTH/NURSE VISIT (OUTPATIENT)
Dept: OPHTHALMOLOGY | Facility: CLINIC | Age: 74
End: 2024-05-13
Attending: OPHTHALMOLOGY
Payer: MEDICARE

## 2024-05-13 DIAGNOSIS — H35.89 OTHER SPECIFIED RETINAL DISORDERS: ICD-10-CM

## 2024-05-13 DIAGNOSIS — H35.3130 BILATERAL NONEXUDATIVE AGE-RELATED MACULAR DEGENERATION: ICD-10-CM

## 2024-05-13 DIAGNOSIS — E11.3399: ICD-10-CM

## 2024-05-13 DIAGNOSIS — H35.3130 BILATERAL NONEXUDATIVE AGE-RELATED MACULAR DEGENERATION: Primary | ICD-10-CM

## 2024-05-13 PROCEDURE — 92273 FULL FIELD ERG W/I&R: CPT

## 2024-05-13 PROCEDURE — 92083 EXTENDED VISUAL FIELD XM: CPT | Mod: 26 | Performed by: OPHTHALMOLOGY

## 2024-05-13 PROCEDURE — 92274 MULTIFOCAL ERG W/I&R: CPT | Mod: 26 | Performed by: OPHTHALMOLOGY

## 2024-05-13 PROCEDURE — 92274 MULTIFOCAL ERG W/I&R: CPT

## 2024-05-13 PROCEDURE — 92273 FULL FIELD ERG W/I&R: CPT | Mod: 26 | Performed by: OPHTHALMOLOGY

## 2024-05-13 PROCEDURE — 92083 EXTENDED VISUAL FIELD XM: CPT

## 2024-05-13 PROCEDURE — 999N000103 HC STATISTIC NO CHARGE FACILITY FEE

## 2024-05-13 ASSESSMENT — REFRACTION_WEARINGRX
OS_SPHERE: +0.00
OS_SPHERE: +2.75
SPECS_TYPE: SVL-NEAR
OD_SPHERE: +1.25
OS_AXIS: 160
OD_CYLINDER: +3.75
OD_SPHERE: -1.50
OD_AXIS: 066
OD_CYLINDER: +3.50
OD_CYLINDER: +3.75
OS_AXIS: 163
OS_SPHERE: +0.00
OD_SPHERE: -1.50
OS_CYLINDER: +2.50
OD_AXIS: 058
OD_AXIS: 058
OS_CYLINDER: +2.50
OS_CYLINDER: +2.50
OS_AXIS: 163

## 2024-05-13 ASSESSMENT — VISUAL ACUITY
OS_CC: 20/40
METHOD: SNELLEN - LINEAR
CORRECTION_TYPE: GLASSES
OS_CC: 20/40
OD_CC: 20/70
METHOD: SNELLEN - LINEAR
OD_CC: 20/70

## 2024-05-13 NOTE — NURSING NOTE
Referred by Dr. Guillermo Angulo/[facility] for ffERG+OVF+mfERG. Last eye exam w/Dr. Angulo 04-19-24:  Blurred vision started late  or early  happened rapidly and has been dim and steady ever since. RNFL appears normal. FA shows full and rapid perfusion. The OCT suggests a possible outer retinal change with loss of clarity of IS/OS junction, but this is non-specific and may have predated the change. Trace diabetic macular edema ?right eye.  Moderate nonproliferative diabetic retinopathy both eyes. Dry AMD, early dry stage both eyes, is mild and has minimal impact on vision at this time. No clinical evidence of choroidal neovascularization seen on exam or OCT testing. ABMD both eyes. S/P IOLs both eyes with mild PCO right eye. Discussed that exam findings and testing are not pointing to a clear answer for the dimness of the vision. Recommend ERG to reassure that there is no autoimmune retinopathy, medication/toxic retinopathy or similar condition present.  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  Concerned b/c vision has not significantly improved after CE/IOL BOTH eyes.  Colors appear dimmer.  He loves to read and do hobbies but PG rdg gls do not help LEFT EYE.  Using CSA drops and ATs both eyes and CSA krishna at HS left eye d/t K surface issues.   No followup yet scheduled w/Dr. Blue; will await results.

## 2024-05-13 NOTE — NURSING NOTE
Chief Complaints and History of Present Illnesses   Patient presents with    Procedure mfERG     mfERG + VF   Referred by Guillermo Angulo/[facility] 1:02 PM May 13, 2024        Chief Complaint(s) and History of Present Illness(es)       Procedure mfERG              Comments: mfERG + VF   Referred by Guillermo Angulo/[facility] 1:02 PM May 13, 2024

## 2024-05-13 NOTE — Clinical Note
Problem: PAIN - ADULT  Goal: Verbalizes/displays adequate comfort level or baseline comfort level  Description: Interventions:  - Encourage patient to monitor pain and request assistance  - Assess pain using appropriate pain scale  - Administer analgesics based on type and severity of pain and evaluate response  - Implement non-pharmacological measures as appropriate and evaluate response  - Consider cultural and social influences on pain and pain management  - Notify physician/advanced practitioner if interventions unsuccessful or patient reports new pain  Outcome: Progressing     Problem: INFECTION - ADULT  Goal: Absence or prevention of progression during hospitalization  Description: INTERVENTIONS:  - Assess and monitor for signs and symptoms of infection  - Monitor lab/diagnostic results  - Monitor all insertion sites, i e  indwelling lines, tubes, and drains  - Monitor endotracheal if appropriate and nasal secretions for changes in amount and color  - [place] appropriate cooling/warming therapies per order  - Administer medications as ordered  - Instruct and encourage patient and family to use good hand hygiene technique  - Identify and instruct in appropriate isolation precautions for identified infection/condition  Outcome: Progressing  Goal: Absence of fever/infection during neutropenic period  Description: INTERVENTIONS:  - Monitor WBC    Outcome: Progressing     Problem: SAFETY ADULT  Goal: Patient will remain free of falls  Description: INTERVENTIONS:  - Educate patient/family on patient safety including physical limitations  - Instruct patient to call for assistance with activity   - Consult OT/PT to assist with strengthening/mobility   - Keep Call bell within reach  - Keep bed low and locked with side rails adjusted as appropriate  - Keep care items and personal belongings within reach  - Initiate and maintain comfort rounds  - Make Fall Risk Sign visible to staff  - Apply yellow socks and bracelet mfERG + VF done for high fall risk patients  - Consider moving patient to room near nurses station  Outcome: Progressing  Goal: Maintain or return to baseline ADL function  Description: INTERVENTIONS:  -  Assess patient's ability to carry out ADLs; assess patient's baseline for ADL function and identify physical deficits which impact ability to perform ADLs (bathing, care of mouth/teeth, toileting, grooming, dressing, etc )  - Assess/evaluate cause of self-care deficits   - Assess range of motion  - Assess patient's mobility; develop plan if impaired  - Assess patient's need for assistive devices and provide as appropriate  - Encourage maximum independence but intervene and supervise when necessary  - Involve family in performance of ADLs  - Assess for home care needs following discharge   - Consider OT consult to assist with ADL evaluation and planning for discharge  - Provide patient education as appropriate  Outcome: Progressing  Goal: Maintains/Returns to pre admission functional level  Description: INTERVENTIONS:  - Perform BMAT or MOVE assessment daily    - Set and communicate daily mobility goal to care team and patient/family/caregiver     - Collaborate with rehabilitation services on mobility goals if consulted  - Out of bed for toileting  - Record patient progress and toleration of activity level   Outcome: Progressing     Problem: DISCHARGE PLANNING  Goal: Discharge to home or other facility with appropriate resources  Description: INTERVENTIONS:  - Identify barriers to discharge w/patient and caregiver  - Arrange for needed discharge resources and transportation as appropriate  - Identify discharge learning needs (meds, wound care, etc )  - Arrange for interpretive services to assist at discharge as needed  - Refer to Case Management Department for coordinating discharge planning if the patient needs post-hospital services based on physician/advanced practitioner order or complex needs related to functional status, cognitive ability, or social support system  Outcome: Progressing     Problem: Knowledge Deficit  Goal: Patient/family/caregiver demonstrates understanding of disease process, treatment plan, medications, and discharge instructions  Description: Complete learning assessment and assess knowledge base  Interventions:  - Provide teaching at level of understanding  - Provide teaching via preferred learning methods  Outcome: Progressing     Problem: NEUROSENSORY - ADULT  Goal: Achieves stable or improved neurological status  Description: INTERVENTIONS  - Monitor and report changes in neurological status  - Monitor vital signs such as temperature, blood pressure, glucose, and any other labs ordered   - Initiate measures to prevent increased intracranial pressure  - Monitor for seizure activity and implement precautions if appropriate      Outcome: Progressing  Goal: Remains free of injury related to seizures activity  Description: INTERVENTIONS  - Maintain airway, patient safety  and administer oxygen as ordered  - Monitor patient for seizure activity, document and report duration and description of seizure to physician/advanced practitioner  - If seizure occurs,  ensure patient safety during seizure  - Reorient patient post seizure  - Seizure pads on all 4 side rails  - Instruct patient/family to notify RN of any seizure activity including if an aura is experienced  - Instruct patient/family to call for assistance with activity based on nursing assessment  - Administer anti-seizure medications if ordered    Outcome: Progressing  Goal: Achieves maximal functionality and self care  Description: INTERVENTIONS  - Monitor swallowing and airway patency with patient fatigue and changes in neurological status  - Encourage and assist patient to increase activity and self care     - Encourage visually impaired, hearing impaired and aphasic patients to use assistive/communication devices  Outcome: Progressing     Problem: GASTROINTESTINAL - ADULT  Goal: Minimal or absence of nausea and/or vomiting  Description: INTERVENTIONS:  - Administer IV fluids if ordered to ensure adequate hydration  - Maintain NPO status until nausea and vomiting are resolved  - Nasogastric tube if ordered  - Administer ordered antiemetic medications as needed  - Provide nonpharmacologic comfort measures as appropriate  - Advance diet as tolerated, if ordered  - Consider nutrition services referral to assist patient with adequate nutrition and appropriate food choices  Outcome: Progressing  Goal: Maintains or returns to baseline bowel function  Description: INTERVENTIONS:  - Assess bowel function  - Encourage oral fluids to ensure adequate hydration  - Administer IV fluids if ordered to ensure adequate hydration  - Administer ordered medications as needed  - Encourage mobilization and activity  - Consider nutritional services referral to assist patient with adequate nutrition and appropriate food choices  Outcome: Progressing     Problem: GENITOURINARY - ADULT  Goal: Absence of urinary retention  Description: INTERVENTIONS:  - Assess patients ability to void and empty bladder  - Monitor I/O  - Bladder scan as needed  - Discuss with physician/AP medications to alleviate retention as needed  - Discuss catheterization for long term situations as appropriate  Outcome: Progressing     Problem: MOBILITY - ADULT  Goal: Maintain or return to baseline ADL function  Description: INTERVENTIONS:  -  Assess patient's ability to carry out ADLs; assess patient's baseline for ADL function and identify physical deficits which impact ability to perform ADLs (bathing, care of mouth/teeth, toileting, grooming, dressing, etc )  - Assess/evaluate cause of self-care deficits   - Assess range of motion  - Assess patient's mobility; develop plan if impaired  - Assess patient's need for assistive devices and provide as appropriate  - Encourage maximum independence but intervene and supervise when necessary  - Involve family in performance of ADLs  - Assess for home care needs following discharge   - Consider OT consult to assist with ADL evaluation and planning for discharge  - Provide patient education as appropriate  Outcome: Progressing  Goal: Maintains/Returns to pre admission functional level  Description: INTERVENTIONS:  - Perform BMAT or MOVE assessment daily    - Set and communicate daily mobility goal to care team and patient/family/caregiver     - Collaborate with rehabilitation services on mobility goals if consulted  - Out of bed for toileting  - Record patient progress and toleration of activity level   Outcome: Progressing     Problem: Prexisting or High Potential for Compromised Skin Integrity  Goal: Skin integrity is maintained or improved  Description: INTERVENTIONS:  - Identify patients at risk for skin breakdown  - Assess and monitor skin integrity  - Assess and monitor nutrition and hydration status  - Monitor labs   - Assess for incontinence   - Turn and reposition patient  - Assist with mobility/ambulation  - Relieve pressure over bony prominences  - Avoid friction and shearing  - Provide appropriate hygiene as needed including keeping skin clean and dry  - Evaluate need for skin moisturizer/barrier cream  - Collaborate with interdisciplinary team   - Patient/family teaching  - Consider wound care consult   Outcome: Progressing     Problem: Potential for Falls  Goal: Patient will remain free of falls  Description: INTERVENTIONS:  - Educate patient/family on patient safety including physical limitations  - Instruct patient to call for assistance with activity   - Consult OT/PT to assist with strengthening/mobility   - Keep Call bell within reach  - Keep bed low and locked with side rails adjusted as appropriate  - Keep care items and personal belongings within reach  - Initiate and maintain comfort rounds  - Make Fall Risk Sign visible to staff  - Apply yellow socks and bracelet for high fall risk patients  - Consider moving patient to room near nurses station  Outcome: Progressing

## 2024-05-13 NOTE — PROGRESS NOTES
Testing Date:  2024    Referring: Dr. Guillermo Angulo/[facility]     RE:  Anselmo Rainey  MRN:  [id number]  :  1950    CLINICAL HISTORY:  Last eye exam with Dr. Angulo 24:  Blurred vision started late  or early  happened rapidly and has been dim and steady ever since. RNFL appears normal. FA shows full and rapid perfusion. The OCT suggests a possible outer retinal change with loss of clarity of IS/OS junction, but this is non-specific and may have predated the change. Trace diabetic macular edema ?right eye.  Moderate nonproliferative diabetic retinopathy both eyes. Dry AMD, early dry stage both eyes, is mild and has minimal impact on vision at this time. No clinical evidence of choroidal neovascularization seen on exam or OCT testing. ABMD both eyes. S/P IOLs both eyes with mild PCO right eye. Discussed that exam findings and testing are not pointing to a clear answer for the dimness of the vision. Recommend ERG to reassure that there is no autoimmune retinopathy, medication/toxic retinopathy or similar condition present.  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  Concerned b/c vision has not significantly improved after CE/IOL BOTH eyes.  Colors appear dimmer.  He loves to read and do hobbies but PG rdg gls do not help LEFT EYE.  Using CSA drops and ATs both eyes and CSA krishna at HS left eye d/t K surface issues.       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                    Visual Acuity Right Eye : 20/70, PHNI      W/gls & IOL, -1.50 + 3.75x058    Visual Acuity Left Eye : 20/40, PHNI      W/gls & IOL, plano + 2.50x163                                                          ALL AVERAGED             Data for Full-Field ERG  Right Eye  Left Eye   DARK-ADAPTED Patient Normal Patient   0.01 ERG (merry) b-wave amplitude ( v) 198 52.5 to 336.5 170   0.01 ERG (merry) b-wave implicit time (ms) 99.8 81.7 to 125.9 99.8          3.0 ERG (combined)a-wave amplitude ( v) -35 -263.9 to -88.5 -108   3.0 ERG (combined)a-wave implicit time (ms) 25 9.7 to 25.1 20   3.0 ERG (combined)b-wave amplitude ( v) 83 167 to 465.4 202   3.0 ERG (combined)b-wave implicit time (ms) 52.7 44.8 to 55.4 57.1         10.0 ERG (brighter combined)a-wave amplitude ( v) -132 -233.5 to -135.5 -138   10.0 ERG (brighter combined)b-wave implicit time (ms) 16.6 11.2 to 15.2 13.3   10.0 ERG (brighter combined)b-wave amplitude ( v) 272* 108.3 to 443.9 284*   10.0 ERG (brighter combined)b-wave implicit time (ms) 76.5* 35.7 to 53.1 71*         3.0 Oscillatory Potentials  present    LIGHT-ADAPTED       3.0 Flicker (30Hz) amplitude ( v) unreliable 55.8 to 159.6 54   3.0 Flicker (30Hz) implicit time (ms) unreliable 23.5 to 36.1 37.7         3.0 ERG (cone) a-wave amplitude ( v) -3 -44.3 to -14.7 -23   3.0 ERG (cone) a-wave implicit time (ms) 11.1 11.4 to 16.4 17.2   3.0 ERG (cone) b-wave amplitude ( v) 21 60.8 to 149.2 89   3.0 ERG (cone) b-wave implicit time (ms) 28.3 26.3 to 35.7 37.7   * = manipulated cursors  parentheses = cursors at selected peaks  ---- = residual to non-measurable  xxxx = not tested      STANDARD RETeval ERG REPORT,  ISCEV Photopic Flash/Flicker and PhNR cd  --RETeval w/Sensor Strip = 1/3 Espion3 w/DTL                                                          ALL AVERAGED   Data for Full-Field ERG Right Eye  Left Eye   Dark-Adapted Patient Normal Patient   0.01 ERG (merry) amplitude( v) xxxx 21 to 79 xxxx   0.01 ERG (merry) implicit time(ms) xxxx 68 to 103 xxxx         3.0 ERG (combined) a-wave amplitude( v) xxxx -68 to -22 xxxx   3.0 ERG (combined) a-wave implicit time(ms) xxxx 13 to 18 xxxx   3.0 ERG (combined) b-wave amplitude( v) xxxx 42 to 86 xxxx   3.0 ERG (combined) b-wave implicit time(ms) xxxx 35 to 52 xxxx         10.0 ERG (brighter) a-wave amplitude( v) xxxx -77 to -31 xxxx   10.0 ERG (brighter) a-wave implicit time(ms) xxxx 10 to 13 xxxx   10.0  "ERG (brighter) b-wave amplitude( v) xxxx 54 to 95 xxxx   10.0 ERG (brighter) b-wave implicit time(ms) xxxx 35 to 53 xxxx         3.0 Oscillatory Potentials xxxx present xxxx   Light-Adapted      3.0 Flicker (30-Hz) amplitude( v) 15.3 15.0 to 47.1 19.9   3.0 Flicker (30-Hz) implicit time(ms) 37.0 25.0 to 29.8 38.4         3.0 ERG (cone) a-wave amplitude -3.6 -14.5 to -1.9 -3.7   3.0 ERG (cone) a-wave implicit time(ms) 17.1 11.0 to 14.8 12.8   3.0 ERG (cone) b-wave amplitude( v) 18.2 15.4 to 56.4 25.2   3.0 ERG (cone) b-wave implicit time(ms) 38.3 27.9 to 33.0 38.4   ---- = residual to non-measurable      xxxx = not tested            Normative values per [facility] data per individual age at time of testing, cd (dilated) and Td (undilated).         Normative values per \"Evaluation of light- and dark-adapted ERGs using a mydriasis-free,    portable system:  clinical classifications and normative data\" by H Rock, X Malvin, S Breann, SN Alvarez,   M Prince, SHANNEN Campa, CARLOS ALBERTO Perez, SHANNEN Hudson, TATI Loja, Ophthalmology and Vision Sciences,    [facility], [facility] includes the [facility].  [email or web address]             Tech notes:  ffERG discussed and performed with E3 system.  OVF done just prior undilated.  Instructed on importance of maintaining fixation and relaxation for optimum recordings. Equally well-dilated ~bare 7mm.  Tolerated dtl nicely.  Equal and adequate eye opening w/moderate effort needed to clear dilated pupils.  Bilateral lid droop at rest and pt arrives sleepy(unable to sleep well).  Impedances low and comparable throughout but with some increased noise and interference d/t tendency to shut eyes d/t sleepiness.  Otherwise, no difficulty with blinking.  Specifically, no eyelid flutter to bright flashes DA.   +Eyelid flutter for LA cone and flicker stimuli with inadequate eye opening and difficulty with reliability.  NOTE:  Vertical diplopia in ColorDome but no strabismus noted.  Both " eyes held fixation well w/o rollback.  RETeval ISCEV Photopic flash/flicker and PhNR cd (dilated) discussed and performed d/t slight difficulty tolerating LA stimuli for E3.  RETeval On-Off w/w also done d/t mention of autoimmune retinopathy, medication/toxic retinopathy or similar condition.  Sensor Strip overlying hairline w/some interference.  Pt fixated nicely w/fellow eye occluded.  No major probs w/eye movement or eyelid flutter/blinking.    INTERPRETATION ffERG:  This full field electroretinogram was performed according to ISCEV standards using ESPION E3 system and DTL fiber recording electrodes. The patient tolerated the testing well.  The waveforms are fairly reproducible and well formed.  The normative values provided above represent the 95% confidence limits for a normal individual the age of the patient. The patient s responses are averaged.  There is mild asymmetry of the responses in between both eyes.  In dark adapted conditions,  the merry-specific responses have normal amplitudes and the implicit times are normal.  The maximal response, a combined merry and cone responses, have reduced amplitudes and the implicit time is  normal for the right eye. For the left eye the amplitudes were normal, but there is delayed b-wave implicit time.  With a higher intensity flash, the responses improve, but persistently reduced in the right eye.  The bright flash (scotopic 10.0) response is not electronegative.  The oscillatory potentials are present  bilaterally.      In light adapted conditions, the 30-Hz flicker responses have abnormal amplitudes and the implicit time is delayed in both eyes.    The single photopic response have normal decreased right eye amplitudes and and the implicit time is delayed in both eyes.    The RETeval light adapted showed abnormal 3.0 flicker cone responses right eye and delayed implicit time left eye     Conclusion:    This electroretinogram is abnormal, showing loss of  cone/merry  function.    the etiology for this is unknown and could be consistent with retinal dystrophy. The differential diagnosis includes: Inherited retina dystrophy, post-inflammatory retinopathy, toxic retinopathy and Autoimmune Retinopathy     Clinical correlation is recommended.    A  repeat electroretinogram could be considered in 1-2 years or earlier if the clinical situation changes.      INTERPRETATION mfERG    Tech notes: mfERG (9min 60 Hz) discussed and preformed with Veris system and 0.5%CBM.  Equally well dilated 6mm. Used adult ctl w snug fit. Squeezing during insertion of ctl electrode. Taped ctl electrode upright BE. BE visualized default fixation cross 2, diameter 15 degrees, pen size 7%, 4 degrees free central zone. Nice blinking. Heavy breathing. Slight body movement due to being uncomfortable in exam chair. Toggled between fundus and external camera and observed good fixation BE.      MULTIFOCAL ERG REPORT     mfERG  IMPRESSION:     1. Abnormal multifocal electroretinogram (ERG)  Consistent with maculopathy     DIAGNOSTIC FINDINGS:  This is a reliable and well performed multifocal electroretinogram both eyes.  A 103-hexagon stimulus pattern multifocal ERG was done in both eyes.  The multifocal ERG was abnormal in both eyes using the Veris system.   In particular, in both eyes the waveform tracings showed normal morphology and good signal-to-noise ratio.    The amplitude plot showed decreased  foveal peak.    The amplitudes themselves attenuated in both eyes   The Implicit times were prolonged in both eyes   The response densities ring ratios were decreased both eyes.       For the left eye, the test was noted to be reliable by the technician.   The waveform tracings showed good SNR.   The morphology was normal.   The amplitude plot showed normal amplitudes and a well defined foveal peak.   The amplitude values were normal as were the latencies.   The ring tracings were normal.   The ring ratios were  normal for ring amplitudes.   The interpretation of ring ratios is not well documented outside of the setting of plaquenil use, however    OVF INTERPRETATION   IMPRESSION OVF: Abnormal visual fields with inf > sup spots of decreased sensitivity in both eyes   Right eye: FP: 0/14; FN 2/14; MD -6.1 scattered areas of decreased sensitivity inf more than sup  Left eye: FP: 0/10; FN 1/10; MD -7.6 scattered areas of decreased sensitivity inf more than sup      Dear Guillermo, thank you for the opportunity to provide electrophysiologic services for this patient.  Please do not hesitate to call if there should be any questions regarding these results.      Niki Maldonado MD  Professor of ophthalmology   Electrophysiology Service   Department of Ophthalmology & Visual Neurosciences   [facility]  Phone: [phone]   Fax: [phone]

## 2024-05-13 NOTE — LETTER
May 15, 2024    Testing Date:  2024    Referring:    Guillermo Angulo MD, PhD / [facility]     RE:  Anselmo Rainey  MRN:  [id number]  :  1950    FULL FIELD-ELECTRORETINOGRAM (ffERG) REPORT    CLINICAL HISTORY:  Last eye exam with Dr. Angulo 24:  Blurred vision started late  or early  happened rapidly and has been dim and steady ever since. RNFL appears normal. FA shows full and rapid perfusion. The OCT suggests a possible outer retinal change with loss of clarity of IS/OS junction, but this is non-specific and may have predated the change. Trace diabetic macular edema ?right eye.  Moderate nonproliferative diabetic retinopathy both eyes. Dry AMD, early dry stage both eyes, is mild and has minimal impact on vision at this time. No clinical evidence of choroidal neovascularization seen on exam or OCT testing. ABMD both eyes. S/P IOLs both eyes with mild PCO right eye. Discussed that exam findings and testing are not pointing to a clear answer for the dimness of the vision. Recommend ERG to reassure that there is no autoimmune retinopathy, medication/toxic retinopathy or similar condition present.  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  Concerned b/c vision has not significantly improved after CE/IOL BOTH eyes.  Colors appear dimmer.  He loves to read and do hobbies but PG rdg gls do not help LEFT EYE.  Using CSA drops and ATs both eyes and CSA krishna at HS left eye d/t K surface issues.       IMPRESSION ffERG:  Cone-merry dysfunction of both eyes                    Visual Acuity: Right Eye: 20/70, PHNI      W/gls & IOL, -1.50 +3.75 x 058      Left Eye: 20/40, PHNI      W/gls & IOL, plano +2.50 x 163                                      ALL AVERAGED             Data for Full-Field ERG  Right Eye  Left Eye   DARK-ADAPTED Patient Normal Patient   0.01 ERG (merry) b-wave amplitude ( v) 198 52.5 to 336.5 170   0.01 ERG (merry)  b-wave implicit time (ms) 99.8 81.7 to 125.9 99.8         3.0 ERG (combined)a-wave amplitude ( v) -35 -263.9 to -88.5 -108   3.0 ERG (combined)a-wave implicit time (ms) 25 9.7 to 25.1 20   3.0 ERG (combined)b-wave amplitude ( v) 83 167 to 465.4 202   3.0 ERG (combined)b-wave implicit time (ms) 52.7 44.8 to 55.4 57.1         10.0 ERG (brighter combined)a-wave amplitude ( v) -132 -233.5 to -135.5 -138   10.0 ERG (brighter combined)b-wave implicit time (ms) 16.6 11.2 to 15.2 13.3   10.0 ERG (brighter combined)b-wave amplitude ( v) 272* 108.3 to 443.9 284*   10.0 ERG (brighter combined)b-wave implicit time (ms) 76.5* 35.7 to 53.1 71*         3.0 Oscillatory Potentials Present  Present  Present    LIGHT-ADAPTED       3.0 Flicker (30Hz) amplitude ( v) unreliable 55.8 to 159.6 54   3.0 Flicker (30Hz) implicit time (ms) unreliable 23.5 to 36.1 37.7         3.0 ERG (cone) a-wave amplitude ( v) -3 -44.3 to -14.7 -23   3.0 ERG (cone) a-wave implicit time (ms) 11.1 11.4 to 16.4 17.2   3.0 ERG (cone) b-wave amplitude ( v) 21 60.8 to 149.2 89   3.0 ERG (cone) b-wave implicit time (ms) 28.3 26.3 to 35.7 37.7   * = manipulated cursors  parentheses = cursors at selected peaks  ---- = residual to non-measurable  xxxx = not tested      STANDARD RETeval ERG REPORT,  ISCEV Photopic Flash/Flicker and PhNR cd  --RETeval w/Sensor Strip = 1/3 Espion3 w/DTL                                                                       ALL AVERAGED   Data for Full-Field ERG Right Eye  Left Eye   Dark-Adapted Patient Normal Patient   0.01 ERG (merry) amplitude( v) xxxx 21 to 79 xxxx   0.01 ERG (merry) implicit time(ms) xxxx 68 to 103 xxxx         3.0 ERG (combined) a-wave amplitude( v) xxxx -68 to -22 xxxx   3.0 ERG (combined) a-wave implicit time(ms) xxxx 13 to 18 xxxx   3.0 ERG (combined) b-wave amplitude( v) xxxx 42 to 86 xxxx   3.0 ERG (combined) b-wave implicit time(ms) xxxx 35 to 52 xxxx         10.0 ERG (brighter) a-wave amplitude( v) xxxx -77 to  "-31 xxxx   10.0 ERG (brighter) a-wave implicit time(ms) xxxx 10 to 13 xxxx   10.0 ERG (brighter) b-wave amplitude( v) xxxx 54 to 95 xxxx   10.0 ERG (brighter) b-wave implicit time(ms) xxxx 35 to 53 xxxx         3.0 Oscillatory Potentials xxxx Present  xxxx   Light-Adapted      3.0 Flicker (30-Hz) amplitude( v) 15.3 15.0 to 47.1 19.9   3.0 Flicker (30-Hz) implicit time(ms) 37.0 25.0 to 29.8 38.4         3.0 ERG (cone) a-wave amplitude -3.6 -14.5 to -1.9 -3.7   3.0 ERG (cone) a-wave implicit time(ms) 17.1 11.0 to 14.8 12.8   3.0 ERG (cone) b-wave amplitude( v) 18.2 15.4 to 56.4 25.2   3.0 ERG (cone) b-wave implicit time(ms) 38.3 27.9 to 33.0 38.4   ---- = residual to non-measurable      xxxx = not tested            Normative values per [facility] data per individual age at time of testing, cd (dilated) and Td (undilated).         Normative values per \"Evaluation of light- and dark-adapted ERGs using a mydriasis-free,    portable system:  clinical classifications and normative data\" by H Rock, X Malvin, S Breann, SN Alvarez,   M Prince, SHANNEN Campa, CARLOS ALBERTO Perez, SHANNEN Hudson, TATI Loja, Ophthalmology and Vision Sciences,    [facility], [facility].  [email or web address]           Tech notes:  ffERG discussed and performed with E3 system.  OVF done just prior undilated.  Instructed on importance of maintaining fixation and relaxation for optimum recordings. Equally well-dilated ~bare 7mm.  Tolerated dtl nicely.  Equal and adequate eye opening w/moderate effort needed to clear dilated pupils.  Bilateral lid droop at rest and pt arrives sleepy (unable to sleep well).  Impedances low and comparable throughout but with some increased noise and interference d/t tendency to shut eyes d/t sleepiness.  Otherwise, no difficulty with blinking.  Specifically, no eyelid flutter to bright flashes DA.   +Eyelid flutter for LA cone and flicker stimuli with inadequate eye opening and difficulty with " reliability.  NOTE:  Vertical diplopia in ColorDome but no strabismus noted.  Both eyes held fixation well w/o rollback.  RETeval ISCEV Photopic flash/flicker and PhNR cd (dilated) discussed and performed d/t slight difficulty tolerating LA stimuli for E3.  RETeval On-Off w/w also done d/t mention of autoimmune retinopathy, medication/toxic retinopathy or similar condition.  Sensor Strip overlying hairline w/some interference.  Pt fixated nicely w/fellow eye occluded.  No major probs w/eye movement or eyelid flutter/blinking.    INTERPRETATION ffERG:  This full-field electroretinogram was performed according to ISCEV standards using the ESPION E3 system and DTL fiber-recording electrodes. The patient tolerated the testing well. The waveforms are fairly reproducible and well formed. The normative values provided above represent the 95% confidence limits for a normal individual the age of the patient. The patient s responses are averaged. There is mild asymmetry of the responses in between both eyes.    In dark-adapted conditions, the merry-specific responses have normal amplitudes and the implicit times are normal.  The maximal response, a combined merry and cone response, has reduced amplitudes and the implicit time is normal for the right eye. For the left eye, the amplitudes were normal but there is delayed b-wave implicit time. With a higher intensity flash, the responses improve but are persistently reduced in the right eye. The bright flash (scotopic 10.0) response is not electronegative. The oscillatory potentials are present bilaterally.      In light-adapted conditions, the 30-Hz flicker responses have abnormal amplitudes and the implicit time is delayed in both eyes.  The single photopic responses have normal decreased right eye amplitudes and the implicit time is delayed in both eyes. The RETeval light-adapted showed abnormal 3.0 flicker cone responses right eye and delayed implicit time left eye.      CONCLUSION:  This electroretinogram is abnormal, showing loss of cone/merry function. The etiology for this is unknown and could be consistent with retinal dystrophy. The differential diagnoses include: Inherited retinal dystrophy, post-inflammatory retinopathy, toxic retinopathy and autoimmune retinopathy.     Clinical correlation is recommended.    A repeat electroretinogram could be considered in 1-2 years, or earlier if the clinical situation changes.      MULTIFOCAL ERG REPORT   INTERPRETATION mfERG  Tech notes: mfERG (9min 60 Hz) discussed and preformed with Veris system and 0.5%CBM.  Equally well dilated 6mm. Used adult ctl w snug fit. Squeezing during insertion of ctl electrode. Taped ctl electrode upright BE.   BE visualized default fixation cross 2, diameter 15 degrees, pen size 7%, 4 degrees free central zone. Nice blinking. Heavy breathing. Slight body movement due to being uncomfortable in exam chair. Toggled between fundus and external camera and observed good fixation BE.      IMPRESSION mfERG:    Abnormal multifocal electroretinogram (ERG) consistent with maculopathy     DIAGNOSTIC FINDINGS:  This is a reliable and well-performed multifocal electroretinogram both eyes.  A 103-hexagon stimulus pattern multifocal ERG was done in both eyes.  The multifocal ERG was abnormal in both eyes using the Veris system. In particular, in both eyes the waveform tracings showed normal morphology and good signal-to-noise ratio.  The amplitude plot showed decreased  foveal peak.  The amplitudes themselves attenuated in both eyes. The Implicit times were prolonged in both eyes. The response densities ring ratios were decreased both eyes.       For the left eye, the test was noted to be reliable by the technician.  The waveform tracings showed good SNR.   The morphology was normal. The amplitude plot showed normal amplitudes and a well-defined foveal peak.   The amplitude values were normal as were the latencies.  The  ring tracings were normal. The ring ratios were normal for ring amplitudes. The interpretation of ring ratios is not well documented outside of the setting of Plaquenil use, however.    OVF INTERPRETATION   IMPRESSION OVF: Abnormal visual fields with inf > sup spots of decreased sensitivity in both eyes   Right eye: FP: 0/14; FN 2/14; MD -6.1 scattered areas of decreased sensitivity inf more than sup  Left eye: FP: 0/10; FN 1/10; MD -7.6 scattered areas of decreased sensitivity inf more than sup      Dear Guillermo, thank you for the opportunity to provide electrophysiologic services for this patient.  Please do not hesitate to call if there should be any questions regarding these results.    Sincerely,    Niki Maldonado MD  Professor of ophthalmology   Electrophysiology Service   Department of Ophthalmology & Visual Neurosciences   [facility]  Phone: [phone]   Fax: [phone]

## 2024-08-06 DIAGNOSIS — K60.30 ANAL FISTULA: Primary | ICD-10-CM

## 2024-08-06 DIAGNOSIS — K62.82 ANAL DYSPLASIA: ICD-10-CM

## 2024-08-13 ENCOUNTER — TELEPHONE (OUTPATIENT)
Dept: SURGERY | Facility: CLINIC | Age: 74
End: 2024-08-13
Payer: MEDICARE

## 2024-08-13 NOTE — TELEPHONE ENCOUNTER
Attempted to call patient on 8/13 regarding scheduling surgery with Dr. Cedillo     Was unable to leave voicemail due to voicemail being full    Patient does not have MyChart account    Eleanor Mejias on 8/13/2024 at 2:13 PM

## 2024-09-27 NOTE — TELEPHONE ENCOUNTER
Called and spoke with patient regarding scheduling procedure with Dr. Cedillo.    Patient stated they have had this procedure multiple time and have been denied from the [facility] due to other medical conditions. Writer stated they will confer with Dr. Cedillo and RN and call patient back with date options.    Patient stated they are unavailable November 4, 6, 18-23.    Maxwell Coleman on 9/27/2024 at 3:06 PM

## 2024-10-07 NOTE — TELEPHONE ENCOUNTER
Called patient and left voicemail to schedule surgery with Dr. Cedillo.     Patient will need PAC appointment but can be scheduled at the [facility] in the meantime.     Provided direct call back number [phone].    Kayla Nixon on 10/7/2024 at 10:55 AM

## 2025-04-10 ENCOUNTER — OFFICE VISIT (OUTPATIENT)
Dept: SURGERY | Facility: CLINIC | Age: 75
End: 2025-04-10
Payer: MEDICARE

## 2025-04-10 VITALS
WEIGHT: 270 LBS | BODY MASS INDEX: 42.38 KG/M2 | OXYGEN SATURATION: 97 % | SYSTOLIC BLOOD PRESSURE: 139 MMHG | DIASTOLIC BLOOD PRESSURE: 85 MMHG | HEART RATE: 83 BPM | HEIGHT: 67 IN

## 2025-04-10 DIAGNOSIS — Z87.19 HISTORY OF ANAL DYSPLASIA: Primary | ICD-10-CM

## 2025-04-10 PROCEDURE — G0452 MOLECULAR PATHOLOGY INTERPR: HCPCS | Mod: 26 | Performed by: PATHOLOGY

## 2025-04-10 PROCEDURE — 88112 CYTOPATH CELL ENHANCE TECH: CPT | Mod: TC | Performed by: NURSE PRACTITIONER

## 2025-04-10 PROCEDURE — 87624 HPV HI-RISK TYP POOLED RSLT: CPT | Performed by: NURSE PRACTITIONER

## 2025-04-10 PROCEDURE — 88112 CYTOPATH CELL ENHANCE TECH: CPT | Mod: 26 | Performed by: PATHOLOGY

## 2025-04-10 RX ORDER — TAZAROTENE 1 MG/G
GEL TOPICAL
COMMUNITY
Start: 2023-02-09

## 2025-04-10 ASSESSMENT — PAIN SCALES - GENERAL: PAINLEVEL_OUTOF10: MODERATE PAIN (4)

## 2025-04-10 ASSESSMENT — PATIENT HEALTH QUESTIONNAIRE - PHQ9: SUM OF ALL RESPONSES TO PHQ QUESTIONS 1-9: 20

## 2025-04-10 NOTE — PROGRESS NOTES
Depression Screening Follow-up        4/10/2025    12:05 PM   PHQ   PHQ-9 Total Score 20   Q9: Thoughts of better off dead/self-harm past 2 weeks Not at all         4/10/2025    12:05 PM   Last PHQ-9   1.  Little interest or pleasure in doing things 3   2.  Feeling down, depressed, or hopeless 3   3.  Trouble falling or staying asleep, or sleeping too much 3   4.  Feeling tired or having little energy 3   5.  Poor appetite or overeating 1   6.  Feeling bad about yourself 3   7.  Trouble concentrating 1   8.  Moving slowly or restless 3   Q9: Thoughts of better off dead/self-harm past 2 weeks 0   PHQ-9 Total Score 20   Difficulty at work, home, or with people Extremely dIfficult       Does the patient currently have a mental health provider?  Yes, patient was referred back to current mental health provider.    Scarlet Valenzuela RN

## 2025-04-10 NOTE — PROGRESS NOTES
"Colon and Rectal Surgery Follow-Up Clinic Note    RE: Anselmo Rainey  : 1950  EMANUEL: 4/10/2025    Anselmo Rainey is a very pleasant 74 year old male here for anal Pap.    HPI:  1. Prior emergent colectomy, end ileostomy, Angela's pouch for colitis of unclear etiology - suspected IBD of acute onset  2.  - anal ulcer biopsied by Dr Sarbjit Mai showing AIN3  3.  - HRA, no dysplasia  4.  - HRA - AIN 3  5.  - HRA - AIN1  6.  - HRA - AIN 1  7.  - HRA - AIN 1  8.  - HRA with Dr. Cedillo - AIN1  9. 10/31/23-HRA in the OR with Dr. Cedillo with AIN 1    Physical Examination: Exam was chaperoned by Alethea Alarcon EMT   /85 (BP Location: Right arm, Patient Position: Sitting, Cuff Size: Adult Large)   Pulse 83   Ht 5' 7\"   Wt 270 lb   SpO2 97%   BMI 42.29 kg/m    General: alert, oriented, in no acute distress, sitting comfortably  HEENT: mucous membranes moist    Perianal external examination:  Yellow vessel loops seton in place with ties external to the tract.     Digital rectal examination: Was deferred.    Anoscopy: Was deferred.    Assessment/Plan: 74 year old male with history of anal dysplasia. Anal Pap obtained today. Will follow up with results of this and HPV genotyping for follow up plan. Seton in place with ties external to the tract and does not need replacement at this time. Recheck in one year.    Medical history:  Past Medical History:   Diagnosis Date    Congestive heart failure, unspecified     COPD (chronic obstructive pulmonary disease) (H)     Coronary artery disease     Diabetes (H)     Gastro-oesophageal reflux disease     Heart disease     History of blood transfusion     History of rectal bleeding     Hypertension     Ileostomy in place (H)     Ischemic colitis     Obese     Pacemaker     PONV (postoperative nausea and vomiting)     Psoriasis     PTSD (post-traumatic stress disorder)     Seizures (H)     Sleep apnea     no cpap    Stented coronary " artery 2011    2 STENTS       Surgical history:  Past Surgical History:   Procedure Laterality Date    ANOSCOPY  5/28/2014    Procedure: ANOSCOPY;  Surgeon: Sarbjit Mai MD;  Location: [facility]    ANOSCOPY Right 6/21/2019    Procedure: Exam Under Anesthesia, Microscopy and fulguration of dysplagia, biopsy of right buttock lesion;  Surgeon: Sarbjit Mai MD;  Location: [facility]    ANOSCOPY N/A 9/2/2020    Procedure: EXAM UNDER ANESTHESIA with debridement and biopsy of post-anal ulcer, high definition anal Microscopy with biopsy and fulguration of dysplagia;  Surgeon: Sarbjit Mai MD;  Location: [facility]    ANOSCOPY N/A 9/8/2021    Procedure: High resolution anoscopy with biopsies and a seton exchange;  Surgeon: Sarbjit Mai MD;  [facility]    ANOSCOPY N/A 10/20/2022    Procedure: High resolution anoscopy;  Surgeon: Andre Cedillo MD;  [facility]    BIOPSY ANAL N/A 10/22/2014    Procedure: BIOPSY ANAL;  Surgeon: Sarbjit Mai MD;  Location: [facility]    COLONOSCOPY      EXAM UNDER ANESTHESIA ANUS  5/28/2014    Procedure: EXAM UNDER ANESTHESIA ANUS;  Surgeon: Sarbjit Mai MD;  [facility]    EXAM UNDER ANESTHESIA ANUS N/A 10/22/2014    Procedure: EXAM UNDER ANESTHESIA ANUS;  Surgeon: Sarbjit Mai MD;  [facility]    EXAM UNDER ANESTHESIA ANUS N/A 4/26/2017    Procedure: EXAM UNDER ANESTHESIA ANUS;  Anal Exam with Anal Microscopy and Biopsies ;  Surgeon: Sarbjit Mai MD;  [facility]    EXAM UNDER ANESTHESIA ANUS N/A 8/30/2017    Procedure: EXAM UNDER ANESTHESIA ANUS;  Examination Under anesthesia, Anal Microscopy With Biopsies, fulguration ;  Surgeon: Sarbjit Mai MD;  [facility]    EXAM UNDER ANESTHESIA ANUS N/A 3/14/2018    Procedure: EXAM UNDER ANESTHESIA ANUS;  Anal Examination Under Anesthesia, Anal Microscopy fulgeration of dysplasia;  Surgeon: Sarbjit Mai MD;  [facility]    EXAM UNDER ANESTHESIA ANUS N/A 9/8/2021    Procedure: EXAM UNDER ANESTHESIA;   Surgeon: Sarbjit Mai MD;  [facility]    EXAM UNDER ANESTHESIA ANUS N/A 10/20/2022    Procedure: EXAM UNDER ANESTHESIA, ANUS, Anal Dilation;  Surgeon: Andre Cedillo MD;  [facility]    EXAM UNDER ANESTHESIA RECTUM N/A 6/8/2015    Procedure: EXAM UNDER ANESTHESIA RECTUM;  Surgeon: Sarbjit Mai MD;  [facility]    EXAM UNDER ANESTHESIA RECTUM, ANOSCOPY, HIGH RESOLUTION, WITH CONDYLOMA FULGURATION N/A 10/31/2023    Procedure: EXAM UNDER ANESTHESIA, RECTUM, ANOSCOPY, HIGH RESOLUTION, FULGURATION of anal dysplasia, BIOPSIES;  Surgeon: Andre Cedillo MD;  Location: [facility]    HC PERIANAL BIOPSY      with emergency surgery for post excision or post biopsy hemorrhage    ILEOSTOMY      ILEOSTOMY      MICROSCOPY ANAL N/A 10/22/2014    Procedure: MICROSCOPY ANAL;  Surgeon: Sarbjit Mai MD;  [facility]    MICROSCOPY ANAL N/A 6/8/2015    Procedure: MICROSCOPY ANAL;  Surgeon: Sarbjit Mai MD;  [facility]    MICROSCOPY ANAL N/A 4/26/2017    Procedure: MICROSCOPY ANAL;;  Surgeon: Sarbjit Mai MD;  [facility]    MICROSCOPY ANAL N/A 8/30/2017    Procedure: MICROSCOPY ANAL;;  Surgeon: Sarbjit Mai MD;  [facility]    MICROSCOPY ANAL N/A 3/14/2018    Procedure: MICROSCOPY ANAL;;  Surgeon: Sarbjit Mai MD;  [facility]    MICROSCOPY ANAL N/A 10/10/2018    Procedure: MICROSCOPY ANAL;  Anal Exam, Anal Microscopy, Fulgeration of Dysplagia;  Surgeon: Sarbjit Mai MD;  [facility]    SIGMOIDOSCOPY FLEXIBLE  5/28/2014    Procedure: SIGMOIDOSCOPY FLEXIBLE;  Surgeon: Sarbjit Mai MD;  [facility]    SIGMOIDOSCOPY FLEXIBLE N/A 9/2/2020    Procedure: SIGMOIDOSCOPY, FLEXIBLE with biopsies;  Surgeon: Sarbjit Mai MD;  [facility]    SIGMOIDOSCOPY FLEXIBLE N/A 9/8/2021    Procedure: flexible sigmoidoscopy with biopsies;  Surgeon: Sarbjit Mai MD;  [facility]    SIGMOIDOSCOPY FLEXIBLE N/A 10/20/2022    Procedure: SIGMOIDOSCOPY, FLEXIBLE;   Surgeon: Andre Cedillo MD;  [facility]    SIGMOIDOSCOPY FLEXIBLE N/A 10/31/2023    Procedure: SIGMOIDOSCOPY, FLEXIBLE with biposies, anal dilation;  Surgeon: Andre Cedillo MD;  Location[facility]    STENT      ZC LAP,SURG,COLECTOMY,TOTAL,W/PROCTECTOMY         Problem list:    Patient Active Problem List    Diagnosis Date Noted    History of anal dysplasia 07/23/2020     Priority: Medium     Added automatically from request for surgery [id number]      Post-op pain 06/21/2019     Priority: Medium    Cardiomegaly 08/01/2017     Priority: Medium    Diabetes mellitus without complication (H) 08/01/2017     Priority: Medium    Gastroesophageal reflux disease 08/01/2017     Priority: Medium    Essential hypertension 08/01/2017     Priority: Medium    Hyperlipidemia 08/01/2017     Priority: Medium    Morbid obesity (H) 08/01/2017     Priority: Medium    Posttraumatic stress disorder 08/01/2017     Priority: Medium    Dave de la Tourette's syndrome 08/01/2017     Priority: Medium    Sleep apnea 08/01/2017     Priority: Medium    Rosacea 08/01/2017     Priority: Medium    Rectal hemorrhage 12/07/2012     Priority: Medium    Other shock (H) 02/17/2012     Priority: Medium    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  CATHI stent to mid RCA and Mid RPLA.  Mild to mod disease of the LCA.  Normal EF.  05/27/11      Shortness of breath 05/24/2011     Priority: Medium     Overview:   Inferior ischemia  on CLITE.  CATHI stent to mid RCA and Mid RPLA.  Mild to mod disease of the LCA.  Normal EF.  05/27/11      Other sexual dysfunction not due to a substance or known physiological condition 02/05/2004     Priority: Medium    Psychosexual dysfunction with inhibited sexual excitement 02/05/2004     Priority: Medium    Psoriasis 11/24/2003     Priority: Medium       Medications:  Current Outpatient Medications   Medication Sig Dispense Refill    acetaminophen (TYLENOL) 325 MG tablet Take 1-2 tablets (325-650 mg) by mouth every 4 hours as needed for other (mild pain) Alternate with ibuprofen (ADVIL/MOTRIN), IF ordered. 10 tablet 0    albuterol (PROAIR HFA/PROVENTIL HFA/VENTOLIN HFA) 108 (90 Base) MCG/ACT inhaler Inhale 2 puffs into the lungs every 4 hours as needed      ASPIRIN PO Take 81 mg by mouth daily      bumetanide (BUMEX) 1 MG tablet Take by mouth daily      buPROPion (WELLBUTRIN) 75 MG tablet Take 150 mg by mouth 2 times daily      CarBAMazepine (TEGRETOL PO) Take 800 mg by mouth 2 times daily      CLONAZEPAM PO Take 0.5 mg by mouth as needed for anxiety      ferrous sulfate 325 (65 FE) MG tablet Take by mouth 3 times daily (with meals)       glipiZIDE (GLUCOTROL) 10 MG tablet Take 10 mg by mouth 2 times daily (before meals) Take 1 and 1/2 tablets twice daily      insulin glargine (LANTUS) 100 UNIT/ML injection Inject 65 Units Subcutaneous every morning       loperamide (IMODIUM) 2 MG capsule Take 2 mg by mouth 4 times daily as needed      losartan (COZAAR) 100 MG tablet Take 100 mg by mouth daily      miconazole (MICATIN; MICRO GUARD) 2 % powder Apply topically as needed      mometasone (ASMANEX) 220 MCG/INH inhaler Inhale 2 puffs into the lungs 2 times daily      nitroglycerin (NITROSTAT) 0.4 MG SL tablet Place under the tongue every 5 minutes as needed      ondansetron (ZOFRAN ODT) 4 MG ODT tab Take 1 tablet (4 mg) by mouth every 8 hours as needed for nausea 10 tablet 0    orlistat (XENICAL) 120 MG capsule Take  120 mg by mouth 3 times daily (with meals)      simvastatin (ZOCOR) 40 MG tablet Take 20 mg by mouth At Bedtime      tamsulosin (FLOMAX) 0.4 MG capsule Take 0.4 mg by mouth daily      tazarotene (TAZORAC) 0.1 % external gel APPLY THIN LAYER TOPICALLY EVERY DAY      TOPIRAMATE PO Take 100 mg by mouth 3 times daily       triamcinolone (KENALOG) 0.1 % cream Apply topically 2 times daily as needed      vitamin D3 (CHOLECALCIFEROL) 1000 units (25 mcg) tablet Take 2,000 Units by mouth 3 times daily (with meals)       warfarin ANTICOAGULANT (COUMADIN) 5 MG tablet TAKE THIS MEDICATION BY MOUTH EVERY DAY TO TREAT AND/OR PREVENT BLOOD CLOTS AS DIRECTED BY THE ANTICOAGULATION CLINIC (PHONE: [phone])      acetaminophen (TYLENOL) 325 MG tablet Take 2 tablets (650 mg) by mouth every 4 hours as needed for mild pain (to moderate pain) 30 tablet 0    AZATHIOPRINE PO Take 50 mg by mouth 2 times daily      clotrimazole (LOTRIMIN) 1 % cream Apply topically 2 times daily      desonide (DESOWEN) 0.05 % cream Apply topically 2 times daily      fluocinonide (LIDEX) 0.05 % cream Apply topically 2 times daily      FUROSEMIDE PO Take 80 mg by mouth daily  (Patient not taking: Reported on 4/10/2025)      glucose 4 G CHEW Take 3 tablets by mouth daily as needed      isosorbide mononitrate (IMDUR) 30 MG 24 hr tablet Take 30 mg by mouth daily      oxyCODONE (ROXICODONE) 5 MG tablet Take 1 tablet (5 mg) by mouth every 6 hours as needed for severe pain (Patient not taking: Reported on 4/10/2025) 10 tablet 0    oxyCODONE (ROXICODONE) 5 MG tablet Take 1-2 tablets (5-10 mg) by mouth every 6 hours as needed for moderate to severe pain (Patient not taking: Reported on 4/10/2025) 12 tablet 0       Allergies:  Allergies   Allergen Reactions    Cephalexin Difficulty breathing    Gemfibrozil Other (See Comments)     unknown    Levofloxacin Difficulty breathing     Throat tightens and he gets a spotty rash    Lisinopril Cough       Family  "history:  Family History   Problem Relation Age of Onset    Anesthesia Reaction No family hx of     Crohn's Disease No family hx of        Social history:  Social History     Tobacco Use    Smoking status: Former     Current packs/day: 0.00     Types: Cigarettes     Quit date: 1998     Years since quittin.4    Smokeless tobacco: Never    Tobacco comments:     Quit in    Substance Use Topics    Alcohol use: No     Marital status: .    Nursing Notes:   Alethea Alarcon  4/10/2025 12:11 PM  Signed  Chief Complaint   Patient presents with    Follow Up     Anal pap       Vitals:    04/10/25 1203   BP: 139/85   BP Location: Right arm   Patient Position: Sitting   Cuff Size: Adult Large   Pulse: 83   SpO2: 97%   Weight: 270 lb   Height: 5' 7\"       Body mass index is 42.29 kg/m .    REEMA Hillman     15 minutes spent on the date of encounter performing chart review, history and exam, documentation and further activities as noted above     KHRIS GomezC  Colon and Rectal Surgery  [facility]    "

## 2025-04-10 NOTE — NURSING NOTE
"Chief Complaint   Patient presents with    Follow Up     Anal pap       Vitals:    04/10/25 1203   BP: 139/85   BP Location: Right arm   Patient Position: Sitting   Cuff Size: Adult Large   Pulse: 83   SpO2: 97%   Weight: 270 lb   Height: 5' 7\"       Body mass index is 42.29 kg/m .    Alethea Alarcon, EMT  "

## 2025-04-10 NOTE — LETTER
4/10/2025       RE: Anselmo Rainey  [address] [facility] [id number]     Dear Colleague,    Thank you for referring your patient, Anselmo Rainey, to the [facility] COLON AND RECTAL SURGERY [facility]IN[facility] [place] at [facility]. Please see a copy of my visit note below.    Depression Screening Follow-up        4/10/2025    12:05 PM   PHQ   PHQ-9 Total Score 20   Q9: Thoughts of better off dead/self-harm past 2 weeks Not at all         4/10/2025    12:05 PM   Last PHQ-9   1.  Little interest or pleasure in doing things 3   2.  Feeling down, depressed, or hopeless 3   3.  Trouble falling or staying asleep, or sleeping too much 3   4.  Feeling tired or having little energy 3   5.  Poor appetite or overeating 1   6.  Feeling bad about yourself 3   7.  Trouble concentrating 1   8.  Moving slowly or restless 3   Q9: Thoughts of better off dead/self-harm past 2 weeks 0   PHQ-9 Total Score 20   Difficulty at work, home, or with people Extremely dIfficult       Does the patient currently have a mental health provider?  Yes, patient was referred back to current mental health provider.    Scarlet Valenzuela RN        Colon and Rectal Surgery Follow-Up Clinic Note    RE: Anselmo Rainey  : 1950  EMANUEL: 4/10/2025    Anselmo Rainey is a very pleasant 74 year old male here for anal Pap.    HPI:  1. Prior emergent colectomy, end ileostomy, Angela's pouch for colitis of unclear etiology - suspected IBD of acute onset  2.  - anal ulcer biopsied by Dr Sarbjit Mai showing AIN3  3.  - HRA, no dysplasia  4.  - HRA - AIN 3  5.  - HRA - AIN1  6.  - HRA - AIN 1  7.  - HRA - AIN 1  8.  - HRA with Dr. Cedillo - AIN1  9. 10/31/23-HRA in the OR with Dr. Cedillo with AIN 1    Physical Examination: Exam was chaperoned by Alethea Alarcon EMT   /85 (BP Location: Right arm, Patient Position: Sitting, Cuff Size: Adult Large)   Pulse 83   Ht  "5' 7\"   Wt 270 lb   SpO2 97%   BMI 42.29 kg/m    General: alert, oriented, in no acute distress, sitting comfortably  HEENT: mucous membranes moist    Perianal external examination:  Yellow vessel loops seton in place with ties external to the tract.     Digital rectal examination: Was deferred.    Anoscopy: Was deferred.    Assessment/Plan: 74 year old male with history of anal dysplasia. Anal Pap obtained today. Will follow up with results of this and HPV genotyping for follow up plan. Seton in place with ties external to the tract and does not need replacement at this time. Recheck in one year.    Medical history:  Past Medical History:   Diagnosis Date     Congestive heart failure, unspecified      COPD (chronic obstructive pulmonary disease) (H)      Coronary artery disease      Diabetes (H)      Gastro-oesophageal reflux disease      Heart disease      History of blood transfusion      History of rectal bleeding      Hypertension      Ileostomy in place (H)      Ischemic colitis      Obese      Pacemaker      PONV (postoperative nausea and vomiting)      Psoriasis      PTSD (post-traumatic stress disorder)      Seizures (H)      Sleep apnea     no cpap     Stented coronary artery 2011    2 STENTS       Surgical history:  Past Surgical History:   Procedure Laterality Date     ANOSCOPY  5/28/2014    Procedure: ANOSCOPY;  Surgeon: Sarbjit Mai MD;  Location: [facility]     ANOSCOPY Right 6/21/2019    Procedure: Exam Under Anesthesia, Microscopy and fulguration of dysplagia, biopsy of right buttock lesion;  Surgeon: Sarbjit Mai MD;  [facility]     ANOSCOPY N/A 9/2/2020    Procedure: EXAM UNDER ANESTHESIA with debridement and biopsy of post-anal ulcer, high definition anal Microscopy with biopsy and fulguration of dysplagia;  Surgeon: Sarbjit Mai MD;  [facility]     ANOSCOPY N/A 9/8/2021    Procedure: High resolution anoscopy with biopsies and a seton exchange;  Surgeon: Sarbjit Mai MD;  " Location: [facility]     ANOSCOPY N/A 10/20/2022    Procedure: High resolution anoscopy;  Surgeon: Andre Cedillo MD;  [facility]     BIOPSY ANAL N/A 10/22/2014    Procedure: BIOPSY ANAL;  Surgeon: Sarbjit Mai MD;  [facility]     COLONOSCOPY       EXAM UNDER ANESTHESIA ANUS  5/28/2014    Procedure: EXAM UNDER ANESTHESIA ANUS;  Surgeon: Sarbjit Mai MD;  [facility]     EXAM UNDER ANESTHESIA ANUS N/A 10/22/2014    Procedure: EXAM UNDER ANESTHESIA ANUS;  Surgeon: Sarbjit Mai MD;  [facility]     EXAM UNDER ANESTHESIA ANUS N/A 4/26/2017    Procedure: EXAM UNDER ANESTHESIA ANUS;  Anal Exam with Anal Microscopy and Biopsies ;  Surgeon: Sarbjit Mai MD;  [facility]     EXAM UNDER ANESTHESIA ANUS N/A 8/30/2017    Procedure: EXAM UNDER ANESTHESIA ANUS;  Examination Under anesthesia, Anal Microscopy With Biopsies, fulguration ;  Surgeon: Sarbjit Mai MD;  [facility]     EXAM UNDER ANESTHESIA ANUS N/A 3/14/2018    Procedure: EXAM UNDER ANESTHESIA ANUS;  Anal Examination Under Anesthesia, Anal Microscopy fulgeration of dysplasia;  Surgeon: Sarbjit Mai MD;  [facility]     EXAM UNDER ANESTHESIA ANUS N/A 9/8/2021    Procedure: EXAM UNDER ANESTHESIA;  Surgeon: Sarbjit Mai MD;  [facility]     EXAM UNDER ANESTHESIA ANUS N/A 10/20/2022    Procedure: EXAM UNDER ANESTHESIA, ANUS, Anal Dilation;  Surgeon: Andre Cedillo MD;  [facility]     EXAM UNDER ANESTHESIA RECTUM N/A 6/8/2015    Procedure: EXAM UNDER ANESTHESIA RECTUM;  Surgeon: Sarbjit Mai MD;  [facility]     EXAM UNDER ANESTHESIA RECTUM, ANOSCOPY, HIGH RESOLUTION, WITH CONDYLOMA FULGURATION N/A 10/31/2023    Procedure: EXAM UNDER ANESTHESIA, RECTUM, ANOSCOPY, HIGH RESOLUTION, FULGURATION of anal dysplasia, BIOPSIES;  Surgeon: Andre Cedillo MD;  [facility]     HC PERIANAL BIOPSY      with emergency surgery for post excision or post biopsy hemorrhage     ILEOSTOMY        ILEOSTOMY       MICROSCOPY ANAL N/A 10/22/2014    Procedure: MICROSCOPY ANAL;  Surgeon: Sarbjit Mai MD;  [facility]     MICROSCOPY ANAL N/A 6/8/2015    Procedure: MICROSCOPY ANAL;  Surgeon: Sarbjit Mai MD;  [facility]     MICROSCOPY ANAL N/A 4/26/2017    Procedure: MICROSCOPY ANAL;;  Surgeon: Sarbjit Mai MD;  [facility]     MICROSCOPY ANAL N/A 8/30/2017    Procedure: MICROSCOPY ANAL;;  Surgeon: Sarbjit Mai MD;  [facility]     MICROSCOPY ANAL N/A 3/14/2018    Procedure: MICROSCOPY ANAL;;  Surgeon: Sarbjit Mai MD;  [facility]     MICROSCOPY ANAL N/A 10/10/2018    Procedure: MICROSCOPY ANAL;  Anal Exam, Anal Microscopy, Fulgeration of Dysplagia;  Surgeon: Sarbjit Mai MD;  [facility]     SIGMOIDOSCOPY FLEXIBLE  5/28/2014    Procedure: SIGMOIDOSCOPY FLEXIBLE;  Surgeon: Sarbjit Mai MD;  [facility]     SIGMOIDOSCOPY FLEXIBLE N/A 9/2/2020    Procedure: SIGMOIDOSCOPY, FLEXIBLE with biopsies;  Surgeon: Sarbjit Mai MD;  [facility]     SIGMOIDOSCOPY FLEXIBLE N/A 9/8/2021    Procedure: flexible sigmoidoscopy with biopsies;  Surgeon: Sarbjit Mai MD;  [facility]     SIGMOIDOSCOPY FLEXIBLE N/A 10/20/2022    Procedure: SIGMOIDOSCOPY, FLEXIBLE;  Surgeon: Andre Cedillo MD;  [facility]     SIGMOIDOSCOPY FLEXIBLE N/A 10/31/2023    Procedure: SIGMOIDOSCOPY, FLEXIBLE with biposies, anal dilation;  Surgeon: Andre Cedillo MD;  [facility]     STENT       [facility] LAP,SURG,COLECTOMY,TOTAL,W/PROCTECTOMY         Problem list:    Patient Active Problem List    Diagnosis Date Noted     History of anal dysplasia 07/23/2020     Priority: Medium     Added automatically from request for surgery [id number]       Post-op pain 06/21/2019     Priority: Medium     Cardiomegaly 08/01/2017     Priority: Medium     Diabetes mellitus without complication (H) 08/01/2017     Priority: Medium     Gastroesophageal reflux disease 08/01/2017     Priority:  Medium     Essential hypertension 08/01/2017     Priority: Medium     Hyperlipidemia 08/01/2017     Priority: Medium     Morbid obesity (H) 08/01/2017     Priority: Medium     Posttraumatic stress disorder 08/01/2017     Priority: Medium     Dave de la Tourette's syndrome 08/01/2017     Priority: Medium     Sleep apnea 08/01/2017     Priority: Medium     Rosacea 08/01/2017     Priority: Medium     Rectal hemorrhage 12/07/2012     Priority: Medium     Other shock (H) 02/17/2012     Priority: Medium     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  CATHI stent to mid RCA and Mid RPLA.  Mild to mod disease of the LCA.  Normal EF.  05/27/11       Shortness of breath 05/24/2011     Priority: Medium     Overview:   Inferior ischemia on CLITE.  CATHI stent to mid RCA and Mid RPLA.  Mild to mod disease of the LCA.  Normal EF.  05/27/11       Other sexual dysfunction not due to a substance or known physiological condition 02/05/2004     Priority: Medium     Psychosexual dysfunction with inhibited sexual excitement 02/05/2004     Priority: Medium     Psoriasis 11/24/2003     Priority: Medium       Medications:  Current Outpatient Medications   Medication Sig Dispense Refill     acetaminophen (TYLENOL) 325 MG tablet Take 1-2 tablets (325-650 mg) by mouth every 4 hours as needed for other (mild pain) Alternate with ibuprofen (ADVIL/MOTRIN), IF ordered. 10 tablet 0     albuterol (PROAIR HFA/PROVENTIL HFA/VENTOLIN HFA) 108  (90 Base) MCG/ACT inhaler Inhale 2 puffs into the lungs every 4 hours as needed       ASPIRIN PO Take 81 mg by mouth daily       bumetanide (BUMEX) 1 MG tablet Take by mouth daily       buPROPion (WELLBUTRIN) 75 MG tablet Take 150 mg by mouth 2 times daily       CarBAMazepine (TEGRETOL PO) Take 800 mg by mouth 2 times daily       CLONAZEPAM PO Take 0.5 mg by mouth as needed for anxiety       ferrous sulfate 325 (65 FE) MG tablet Take by mouth 3 times daily (with meals)        glipiZIDE (GLUCOTROL) 10 MG tablet Take 10 mg by mouth 2 times daily (before meals) Take 1 and 1/2 tablets twice daily       insulin glargine (LANTUS) 100 UNIT/ML injection Inject 65 Units Subcutaneous every morning        loperamide (IMODIUM) 2 MG capsule Take 2 mg by mouth 4 times daily as needed       losartan (COZAAR) 100 MG tablet Take 100 mg by mouth daily       miconazole (MICATIN; MICRO GUARD) 2 % powder Apply topically as needed       mometasone (ASMANEX) 220 MCG/INH inhaler Inhale 2 puffs into the lungs 2 times daily       nitroglycerin (NITROSTAT) 0.4 MG SL tablet Place under the tongue every 5 minutes as needed       ondansetron (ZOFRAN ODT) 4 MG ODT tab Take 1 tablet (4 mg) by mouth every 8 hours as needed for nausea 10 tablet 0     orlistat (XENICAL) 120 MG capsule Take 120 mg by mouth 3 times daily (with meals)       simvastatin (ZOCOR) 40 MG tablet Take 20 mg by mouth At Bedtime       tamsulosin (FLOMAX) 0.4 MG capsule Take 0.4 mg by mouth daily       tazarotene (TAZORAC) 0.1 % external gel APPLY THIN LAYER TOPICALLY EVERY DAY       TOPIRAMATE PO Take 100 mg by mouth 3 times daily        triamcinolone (KENALOG) 0.1 % cream Apply topically 2 times daily as needed       vitamin D3 (CHOLECALCIFEROL) 1000 units (25 mcg) tablet Take 2,000 Units by mouth 3 times daily (with meals)        warfarin ANTICOAGULANT (COUMADIN) 5 MG tablet TAKE THIS MEDICATION BY MOUTH EVERY DAY TO TREAT AND/OR PREVENT BLOOD CLOTS AS DIRECTED BY THE  ANTICOAGULATION CLINIC (PHONE: [phone])       acetaminophen (TYLENOL) 325 MG tablet Take 2 tablets (650 mg) by mouth every 4 hours as needed for mild pain (to moderate pain) 30 tablet 0     AZATHIOPRINE PO Take 50 mg by mouth 2 times daily       clotrimazole (LOTRIMIN) 1 % cream Apply topically 2 times daily       desonide (DESOWEN) 0.05 % cream Apply topically 2 times daily       fluocinonide (LIDEX) 0.05 % cream Apply topically 2 times daily       FUROSEMIDE PO Take 80 mg by mouth daily  (Patient not taking: Reported on 4/10/2025)       glucose 4 G CHEW Take 3 tablets by mouth daily as needed       isosorbide mononitrate (IMDUR) 30 MG 24 hr tablet Take 30 mg by mouth daily       oxyCODONE (ROXICODONE) 5 MG tablet Take 1 tablet (5 mg) by mouth every 6 hours as needed for severe pain (Patient not taking: Reported on 4/10/2025) 10 tablet 0     oxyCODONE (ROXICODONE) 5 MG tablet Take 1-2 tablets (5-10 mg) by mouth every 6 hours as needed for moderate to severe pain (Patient not taking: Reported on 4/10/2025) 12 tablet 0       Allergies:  Allergies   Allergen Reactions     Cephalexin Difficulty breathing     Gemfibrozil Other (See Comments)     unknown     Levofloxacin Difficulty breathing     Throat tightens and he gets a spotty rash     Lisinopril Cough       Family history:  Family History   Problem Relation Age of Onset     Anesthesia Reaction No family hx of      Crohn's Disease No family hx of        Social history:  Social History     Tobacco Use     Smoking status: Former     Current packs/day: 0.00     Types: Cigarettes     Quit date: 1998     Years since quittin.4     Smokeless tobacco: Never     Tobacco comments:     Quit in    Substance Use Topics     Alcohol use: No     Marital status: .    Nursing Notes:   Alethea Alarcon  4/10/2025 12:11 PM  Signed  Chief Complaint   Patient presents with     Follow Up     Anal pap       Vitals:    04/10/25 1203   BP: 139/85   BP Location: Right  "arm   Patient Position: Sitting   Cuff Size: Adult Large   Pulse: 83   SpO2: 97%   Weight: 270 lb   Height: 5' 7\"       Body mass index is 42.29 kg/m .    REEMA Hillman     15 minutes spent on the date of encounter performing chart review, history and exam, documentation and further activities as noted above     NADINE Gomez  Colon and Rectal Surgery  [facility]      Again, thank you for allowing me to participate in the care of your patient.      Sincerely,    Beatrice Radford, APRN CNP    "

## (undated) DEVICE — KIT ENDO FIRST STEP DISINFECTANT 200ML W/POUCH EP-4

## (undated) DEVICE — GLOVE PROTEXIS BLUE W/NEU-THERA 7.5  2D73EB75

## (undated) DEVICE — DRAPE GYN/UROLOGY FLUID POUCH TUR 29455

## (undated) DEVICE — SUCTION MANIFOLD DORNOCH ULTRA CART UL-CL500

## (undated) DEVICE — APPLICATOR COTTON TIP 3" 9325

## (undated) DEVICE — TUBING SMOKE EVAC 2.2CMX3M SEA3715

## (undated) DEVICE — ESU ELEC BLADE 6" COATED E1450-6

## (undated) DEVICE — ESU ELEC BLADE 2.75" COATED/INSULATED E1455

## (undated) DEVICE — PANTIES MESH LG/XLG 2PK 706M2

## (undated) DEVICE — KIT CONNECTOR FOR OLYMPUS ENDOSCOPES DEFENDO 100310

## (undated) DEVICE — SOL WATER IRRIG 1000ML BOTTLE 2F7114

## (undated) DEVICE — SWAB PROCTO OR

## (undated) DEVICE — TUBING SUCTION 10'X3/16" N510

## (undated) DEVICE — PAD CHUX UNDERPAD 23X24" 7136

## (undated) DEVICE — DRSG GAUZE 4X4" TRAY 6939

## (undated) DEVICE — SUCTION SLEEVE NEPTUNE 2 165MM 0703-005-165

## (undated) DEVICE — ENDO CAP AND TUBING STERILE FOR ENDOGATOR  100130

## (undated) DEVICE — CUP AND LID 2PK 2OZ STERILE  SSK9006A

## (undated) DEVICE — LINEN TOWEL PACK X5 5464

## (undated) DEVICE — STRAP UNIVERSAL POSITIONING 2-PIECE 4X47X76" 91-287

## (undated) DEVICE — PREP TECHNI-CARE CHLOROXYLENOL 3% 4OZ BOTTLE C222-4ZWO

## (undated) DEVICE — DRSG TELFA 3X8" 1238

## (undated) DEVICE — ENDO SYSTEM WATER BOTTLE & TUBING W/CO2 FILTER 00711549

## (undated) DEVICE — GOWN IMPERVIOUS BREATHABLE SMART XLG 89045

## (undated) DEVICE — ESU GROUND PAD ADULT W/CORD E7507

## (undated) DEVICE — ESU ELEC NDL 1" COATED/INSULATED E1465

## (undated) DEVICE — SUCTION MANIFOLD NEPTUNE 2 SYS 4 PORT 0702-020-000

## (undated) DEVICE — SYR BULB IRRIG 50ML LATEX FREE 0035280

## (undated) DEVICE — ANOSCOPE PLASTIC CLEAR UNSTERILE 82420

## (undated) DEVICE — PACK RECTAL UMMC

## (undated) DEVICE — VESSEL LOOPS YELLOW MAXI 31145694

## (undated) DEVICE — BLADE CLIPPER SGL USE 9680

## (undated) DEVICE — SU SILK 0 TIE 6X30" A306H

## (undated) DEVICE — JELLY LUBRICATING SURGILUBE 2OZ TUBE

## (undated) DEVICE — GLOVE BIOGEL PI MICRO INDICATOR UNDERGLOVE SZ 7.5 48975

## (undated) DEVICE — WIPE PREMOIST CLEANSING WASHCLOTHS 7988

## (undated) DEVICE — GLOVE PROTEXIS MICRO 7.0  2D73PM70

## (undated) DEVICE — TAPE DURAPORE 3" SILK 1538-3

## (undated) DEVICE — ESU ELEC BLADE 6" COATED/INSULATED E1455-6

## (undated) DEVICE — GLOVE BIOGEL PI MICRO SZ 7.0 48570

## (undated) DEVICE — ENDO FORCEP ENDOJAW BIOPSY 2.8MMX230CM FB-220U

## (undated) DEVICE — ESU PENCIL SMOKE EVAC W/ROCKER SWITCH 0703-047-000

## (undated) DEVICE — PREP SCRUB CARE CHLOROXYLENOL (PCMX) 4OZ 29902-004

## (undated) DEVICE — APPLICATOR COTTON TIP 6"X2 STERILE LF 6012

## (undated) DEVICE — DECANTER VIAL 2006S

## (undated) DEVICE — DRAPE LAP NEONATAL 72X113.5"

## (undated) DEVICE — SOL HYDROGEN PEROXIDE 3% 4OZ BOTTLE F0010

## (undated) DEVICE — SPECIMEN CONTAINER 5OZ STERILE 2600SA

## (undated) DEVICE — SOL ADH LIQUID BENZOIN SWAB 0.6ML C1544

## (undated) DEVICE — TAPE CLOTH 3" CARDINAL 3TRCL03

## (undated) DEVICE — SYR 10ML SLIP TIP W/O NDL 303134

## (undated) DEVICE — ESU ELEC LEEP BALL 3MM

## (undated) DEVICE — DRSG TELFA 3"X8" NON25720

## (undated) DEVICE — PUNCH SKIN 4MM P450

## (undated) DEVICE — APPLICATORS COTTON TIP 6"X2 STERILE LF C15053-006

## (undated) DEVICE — ESU ELEC NDL 6" E1552-6

## (undated) DEVICE — DEFIB PRO-PADZ LVP LQD GEL ADULT 8900-2105-01

## (undated) DEVICE — TAPE CLOTH 2" CARDINAL 3TRCL02

## (undated) DEVICE — LINEN TOWEL PACK X6 WHITE 5487

## (undated) DEVICE — GLOVE ESTEEM BLUE W/NEU-THERA 7.5  2D73PB75

## (undated) DEVICE — SPECIMEN CONTAINER 3OZ W/FORMALIN 59901

## (undated) DEVICE — SPONGE RAY-TEC 4X8" 7318

## (undated) DEVICE — ESU ELEC NDL 6" COATED/INSULATED E1465-6

## (undated) RX ORDER — FENTANYL CITRATE 50 UG/ML
INJECTION, SOLUTION INTRAMUSCULAR; INTRAVENOUS
Status: DISPENSED
Start: 2017-08-30

## (undated) RX ORDER — ACETAMINOPHEN 325 MG/1
TABLET ORAL
Status: DISPENSED
Start: 2019-06-21

## (undated) RX ORDER — PROPOFOL 10 MG/ML
INJECTION, EMULSION INTRAVENOUS
Status: DISPENSED
Start: 2021-09-08

## (undated) RX ORDER — ACETAMINOPHEN 325 MG/1
TABLET ORAL
Status: DISPENSED
Start: 2021-09-08

## (undated) RX ORDER — IODINE AND POTASSIUM IODIDE 50; 100 MG/ML; MG/ML
LIQUID ORAL
Status: DISPENSED
Start: 2023-10-31

## (undated) RX ORDER — FENTANYL CITRATE 50 UG/ML
INJECTION, SOLUTION INTRAMUSCULAR; INTRAVENOUS
Status: DISPENSED
Start: 2020-09-02

## (undated) RX ORDER — LIDOCAINE HYDROCHLORIDE 20 MG/ML
INJECTION, SOLUTION EPIDURAL; INFILTRATION; INTRACAUDAL; PERINEURAL
Status: DISPENSED
Start: 2018-03-14

## (undated) RX ORDER — KETAMINE HCL IN 0.9 % NACL 50 MG/5 ML
SYRINGE (ML) INTRAVENOUS
Status: DISPENSED
Start: 2019-06-21

## (undated) RX ORDER — ACETIC ACID 5 %
LIQUID (ML) MISCELLANEOUS
Status: DISPENSED
Start: 2021-09-08

## (undated) RX ORDER — CITRIC ACID/SODIUM CITRATE 334-500MG
SOLUTION, ORAL ORAL
Status: DISPENSED
Start: 2017-08-30

## (undated) RX ORDER — ACETIC ACID 5 %
LIQUID (ML) MISCELLANEOUS
Status: DISPENSED
Start: 2022-10-20

## (undated) RX ORDER — CEFAZOLIN SODIUM 2 G/100ML
INJECTION, SOLUTION INTRAVENOUS
Status: DISPENSED
Start: 2018-03-14

## (undated) RX ORDER — ACETIC ACID 5 %
LIQUID (ML) MISCELLANEOUS
Status: DISPENSED
Start: 2023-10-31

## (undated) RX ORDER — HYDROMORPHONE HCL IN WATER/PF 6 MG/30 ML
PATIENT CONTROLLED ANALGESIA SYRINGE INTRAVENOUS
Status: DISPENSED
Start: 2022-10-20

## (undated) RX ORDER — ACETAMINOPHEN 325 MG/1
TABLET ORAL
Status: DISPENSED
Start: 2023-10-31

## (undated) RX ORDER — FENTANYL CITRATE-0.9 % NACL/PF 10 MCG/ML
PLASTIC BAG, INJECTION (ML) INTRAVENOUS
Status: DISPENSED
Start: 2021-09-08

## (undated) RX ORDER — ACETAMINOPHEN 325 MG/1
TABLET ORAL
Status: DISPENSED
Start: 2017-08-30

## (undated) RX ORDER — LIDOCAINE HYDROCHLORIDE 20 MG/ML
INJECTION, SOLUTION EPIDURAL; INFILTRATION; INTRACAUDAL; PERINEURAL
Status: DISPENSED
Start: 2019-06-21

## (undated) RX ORDER — ACETIC ACID 5 %
LIQUID (ML) MISCELLANEOUS
Status: DISPENSED
Start: 2017-08-30

## (undated) RX ORDER — FENTANYL CITRATE 50 UG/ML
INJECTION, SOLUTION INTRAMUSCULAR; INTRAVENOUS
Status: DISPENSED
Start: 2017-04-26

## (undated) RX ORDER — FENTANYL CITRATE 50 UG/ML
INJECTION, SOLUTION INTRAMUSCULAR; INTRAVENOUS
Status: DISPENSED
Start: 2022-10-20

## (undated) RX ORDER — ACETAMINOPHEN 325 MG/1
TABLET ORAL
Status: DISPENSED
Start: 2017-04-26

## (undated) RX ORDER — ONDANSETRON 2 MG/ML
INJECTION INTRAMUSCULAR; INTRAVENOUS
Status: DISPENSED
Start: 2018-03-14

## (undated) RX ORDER — BUPIVACAINE HYDROCHLORIDE AND EPINEPHRINE 5; 5 MG/ML; UG/ML
INJECTION, SOLUTION EPIDURAL; INTRACAUDAL; PERINEURAL
Status: DISPENSED
Start: 2017-08-30

## (undated) RX ORDER — PROPOFOL 10 MG/ML
INJECTION, EMULSION INTRAVENOUS
Status: DISPENSED
Start: 2018-03-14

## (undated) RX ORDER — ONDANSETRON 2 MG/ML
INJECTION INTRAMUSCULAR; INTRAVENOUS
Status: DISPENSED
Start: 2019-06-21

## (undated) RX ORDER — BUPIVACAINE HYDROCHLORIDE AND EPINEPHRINE 5; 5 MG/ML; UG/ML
INJECTION, SOLUTION EPIDURAL; INTRACAUDAL; PERINEURAL
Status: DISPENSED
Start: 2018-10-10

## (undated) RX ORDER — PROPOFOL 10 MG/ML
INJECTION, EMULSION INTRAVENOUS
Status: DISPENSED
Start: 2018-10-10

## (undated) RX ORDER — FENTANYL CITRATE 50 UG/ML
INJECTION, SOLUTION INTRAMUSCULAR; INTRAVENOUS
Status: DISPENSED
Start: 2019-06-21

## (undated) RX ORDER — FENTANYL CITRATE 50 UG/ML
INJECTION, SOLUTION INTRAMUSCULAR; INTRAVENOUS
Status: DISPENSED
Start: 2023-10-31

## (undated) RX ORDER — ACETAMINOPHEN 325 MG/1
TABLET ORAL
Status: DISPENSED
Start: 2020-09-02

## (undated) RX ORDER — BUPIVACAINE HYDROCHLORIDE 2.5 MG/ML
INJECTION, SOLUTION EPIDURAL; INFILTRATION; INTRACAUDAL
Status: DISPENSED
Start: 2020-09-02

## (undated) RX ORDER — ALBUTEROL SULFATE 0.83 MG/ML
SOLUTION RESPIRATORY (INHALATION)
Status: DISPENSED
Start: 2017-08-30

## (undated) RX ORDER — PROPOFOL 10 MG/ML
INJECTION, EMULSION INTRAVENOUS
Status: DISPENSED
Start: 2017-08-30

## (undated) RX ORDER — EPHEDRINE SULFATE 50 MG/ML
INJECTION, SOLUTION INTRAMUSCULAR; INTRAVENOUS; SUBCUTANEOUS
Status: DISPENSED
Start: 2021-09-08

## (undated) RX ORDER — ACETAMINOPHEN 325 MG/1
TABLET ORAL
Status: DISPENSED
Start: 2018-10-10

## (undated) RX ORDER — HYDROMORPHONE HYDROCHLORIDE 1 MG/ML
INJECTION, SOLUTION INTRAMUSCULAR; INTRAVENOUS; SUBCUTANEOUS
Status: DISPENSED
Start: 2020-09-02

## (undated) RX ORDER — FENTANYL CITRATE 50 UG/ML
INJECTION, SOLUTION INTRAMUSCULAR; INTRAVENOUS
Status: DISPENSED
Start: 2018-10-10

## (undated) RX ORDER — BUPIVACAINE HYDROCHLORIDE 5 MG/ML
INJECTION, SOLUTION EPIDURAL; INTRACAUDAL
Status: DISPENSED
Start: 2019-06-21

## (undated) RX ORDER — OXYMETAZOLINE HYDROCHLORIDE 0.05 G/100ML
SPRAY NASAL
Status: DISPENSED
Start: 2021-09-08

## (undated) RX ORDER — LIDOCAINE HYDROCHLORIDE 20 MG/ML
INJECTION, SOLUTION EPIDURAL; INFILTRATION; INTRACAUDAL; PERINEURAL
Status: DISPENSED
Start: 2021-09-08

## (undated) RX ORDER — FENTANYL CITRATE 50 UG/ML
INJECTION, SOLUTION INTRAMUSCULAR; INTRAVENOUS
Status: DISPENSED
Start: 2021-09-08

## (undated) RX ORDER — BUPIVACAINE HYDROCHLORIDE AND EPINEPHRINE 2.5; 5 MG/ML; UG/ML
INJECTION, SOLUTION EPIDURAL; INFILTRATION; INTRACAUDAL; PERINEURAL
Status: DISPENSED
Start: 2017-04-26

## (undated) RX ORDER — BUPIVACAINE HYDROCHLORIDE 2.5 MG/ML
INJECTION, SOLUTION EPIDURAL; INFILTRATION; INTRACAUDAL
Status: DISPENSED
Start: 2022-10-20

## (undated) RX ORDER — PROPOFOL 10 MG/ML
INJECTION, EMULSION INTRAVENOUS
Status: DISPENSED
Start: 2019-06-21

## (undated) RX ORDER — LIDOCAINE HYDROCHLORIDE 20 MG/ML
INJECTION, SOLUTION EPIDURAL; INFILTRATION; INTRACAUDAL; PERINEURAL
Status: DISPENSED
Start: 2018-10-10

## (undated) RX ORDER — ACETAMINOPHEN 325 MG/1
TABLET ORAL
Status: DISPENSED
Start: 2022-10-20

## (undated) RX ORDER — OXYCODONE HYDROCHLORIDE 5 MG/1
TABLET ORAL
Status: DISPENSED
Start: 2022-10-20

## (undated) RX ORDER — ACETIC ACID 5 %
LIQUID (ML) MISCELLANEOUS
Status: DISPENSED
Start: 2019-06-21

## (undated) RX ORDER — GLYCOPYRROLATE 0.2 MG/ML
INJECTION, SOLUTION INTRAMUSCULAR; INTRAVENOUS
Status: DISPENSED
Start: 2018-10-10

## (undated) RX ORDER — ONDANSETRON 2 MG/ML
INJECTION INTRAMUSCULAR; INTRAVENOUS
Status: DISPENSED
Start: 2021-09-08

## (undated) RX ORDER — ACETIC ACID 5 %
LIQUID (ML) MISCELLANEOUS
Status: DISPENSED
Start: 2018-10-10

## (undated) RX ORDER — BUPIVACAINE HYDROCHLORIDE 2.5 MG/ML
INJECTION, SOLUTION EPIDURAL; INFILTRATION; INTRACAUDAL
Status: DISPENSED
Start: 2023-10-31